# Patient Record
Sex: MALE | Race: WHITE | NOT HISPANIC OR LATINO | URBAN - METROPOLITAN AREA
[De-identification: names, ages, dates, MRNs, and addresses within clinical notes are randomized per-mention and may not be internally consistent; named-entity substitution may affect disease eponyms.]

---

## 2017-07-03 ENCOUNTER — HOSPITAL ENCOUNTER (OUTPATIENT)
Dept: OTHER | Facility: HOSPITAL | Age: 52
Discharge: HOME | End: 2017-07-03
Attending: FAMILY MEDICINE | Admitting: FAMILY MEDICINE

## 2018-05-04 ENCOUNTER — HOSPITAL ENCOUNTER (OUTPATIENT)
Dept: OTHER | Facility: HOSPITAL | Age: 53
Discharge: HOME | End: 2018-05-04
Attending: INTERNAL MEDICINE | Admitting: INTERNAL MEDICINE

## 2018-11-01 ENCOUNTER — HOSPITAL ENCOUNTER (OUTPATIENT)
Dept: LAB | Facility: HOSPITAL | Age: 53
Discharge: HOME | End: 2018-11-01
Attending: FAMILY MEDICINE | Admitting: FAMILY MEDICINE

## 2018-11-01 ENCOUNTER — CONVERSION ENCOUNTER (OUTPATIENT)
Dept: FAMILY MEDICINE CLINIC | Facility: CLINIC | Age: 53
End: 2018-11-01

## 2018-11-01 LAB
ALBUMIN SERPL-MCNC: 4 G/DL (ref 3.5–4.8)
ALBUMIN SERPL-MCNC: 4 G/DL (ref 3.5–4.8)
ALBUMIN/GLOB SERPL: 1.3 {RATIO} (ref 1–1.7)
ALBUMIN/GLOB SERPL: 1.3 {RATIO} (ref 1–1.7)
ALP SERPL-CCNC: 65 IU/L (ref 32–91)
ALP SERPL-CCNC: 65 IU/L (ref 32–91)
ALT SERPL-CCNC: 31 IU/L (ref 17–63)
ALT SERPL-CCNC: 31 IU/L (ref 17–63)
ANION GAP SERPL CALC-SCNC: 10.8 MMOL/L (ref 10–20)
ANION GAP SERPL CALC-SCNC: 10.8 MMOL/L (ref 10–20)
AST SERPL-CCNC: 23 IU/L (ref 15–41)
AST SERPL-CCNC: 23 IU/L (ref 15–41)
BASOPHILS # BLD AUTO: 0 10*3/UL (ref 0–0.2)
BASOPHILS # BLD AUTO: 0 10*3/UL (ref 0–0.2)
BASOPHILS NFR BLD AUTO: 1 % (ref 0–2)
BASOPHILS NFR BLD AUTO: 1 % (ref 0–2)
BILIRUB SERPL-MCNC: 1.2 MG/DL (ref 0.3–1.2)
BILIRUB SERPL-MCNC: 1.2 MG/DL (ref 0.3–1.2)
BUN SERPL-MCNC: 15 MG/DL (ref 8–20)
BUN SERPL-MCNC: 15 MG/DL (ref 8–20)
BUN/CREAT SERPL: 16.7 (ref 6.2–20.3)
BUN/CREAT SERPL: 16.7 (ref 6.2–20.3)
CALCIUM SERPL-MCNC: 8.8 MG/DL (ref 8.9–10.3)
CALCIUM SERPL-MCNC: 8.8 MG/DL (ref 8.9–10.3)
CHLORIDE SERPL-SCNC: 103 MMOL/L (ref 101–111)
CHLORIDE SERPL-SCNC: 103 MMOL/L (ref 101–111)
CHOLEST SERPL-MCNC: 173 MG/DL
CHOLEST SERPL-MCNC: 173 MG/DL
CHOLEST/HDLC SERPL: 5.7 {RATIO}
CHOLEST/HDLC SERPL: 5.7 {RATIO}
CONV CO2: 27 MMOL/L (ref 22–32)
CONV CO2: 27 MMOL/L (ref 22–32)
CONV LDL CHOLESTEROL DIRECT: 111 MG/DL (ref 0–100)
CONV LDL CHOLESTEROL DIRECT: 111 MG/DL (ref 0–100)
CONV TOTAL PROTEIN: 7.2 G/DL (ref 6.1–7.9)
CONV TOTAL PROTEIN: 7.2 G/DL (ref 6.1–7.9)
CREAT UR-MCNC: 0.9 MG/DL (ref 0.7–1.2)
CREAT UR-MCNC: 0.9 MG/DL (ref 0.7–1.2)
DIFFERENTIAL METHOD BLD: (no result)
EOSINOPHIL # BLD AUTO: 0.1 10*3/UL (ref 0–0.3)
EOSINOPHIL # BLD AUTO: 0.1 10*3/UL (ref 0–0.3)
EOSINOPHIL # BLD AUTO: 1 % (ref 0–3)
EOSINOPHIL # BLD AUTO: 1 % (ref 0–3)
ERYTHROCYTE [DISTWIDTH] IN BLOOD BY AUTOMATED COUNT: 12.9 % (ref 11.5–14.5)
ERYTHROCYTE [DISTWIDTH] IN BLOOD BY AUTOMATED COUNT: 12.9 % (ref 11.5–14.5)
GLOBULIN UR ELPH-MCNC: 3.2 G/DL (ref 2.5–3.8)
GLOBULIN UR ELPH-MCNC: 3.2 G/DL (ref 2.5–3.8)
GLUCOSE SERPL-MCNC: 87 MG/DL (ref 65–99)
GLUCOSE UR QL: 87 MG/DL (ref 65–99)
HCT VFR BLD AUTO: 45.1 % (ref 40–54)
HCT VFR BLD AUTO: 45.1 % (ref 40–54)
HDLC SERPL-MCNC: 30 MG/DL
HDLC SERPL-MCNC: 30 MG/DL
HGB BLD-MCNC: 15.7 G/DL (ref 14–18)
HGB BLD-MCNC: 15.7 G/DL (ref 14–18)
LDLC/HDLC SERPL: 3.6 {RATIO}
LDLC/HDLC SERPL: 3.6 {RATIO}
LIPID INTERPRETATION: ABNORMAL
LYMPHOCYTES # BLD AUTO: 3.2 10*3/UL (ref 0.8–4.8)
LYMPHOCYTES # BLD AUTO: 3.2 10*3/UL (ref 0.8–4.8)
LYMPHOCYTES NFR BLD AUTO: 39 % (ref 18–42)
LYMPHOCYTES NFR BLD AUTO: 39 % (ref 18–42)
MCH RBC QN AUTO: 30.4 PG (ref 26–32)
MCH RBC QN AUTO: 30.4 PG (ref 26–32)
MCHC RBC AUTO-ENTMCNC: 34.7 G/DL (ref 32–36)
MCHC RBC AUTO-ENTMCNC: 34.7 G/DL (ref 32–36)
MCV RBC AUTO: 87.6 FL (ref 80–94)
MCV RBC AUTO: 87.6 FL (ref 80–94)
MONOCYTES # BLD AUTO: 0.7 10*3/UL (ref 0.1–1.3)
MONOCYTES # BLD AUTO: 0.7 10*3/UL (ref 0.1–1.3)
MONOCYTES NFR BLD AUTO: 8 % (ref 2–11)
MONOCYTES NFR BLD AUTO: 8 % (ref 2–11)
NEUTROPHILS # BLD AUTO: 4.2 10*3/UL (ref 2.3–8.6)
NEUTROPHILS NFR BLD AUTO: 51 % (ref 50–75)
NEUTS SEG # BLD MANUAL: 4.2 10*3/UL (ref 2.3–8.6)
NEUTS SEG NFR BLD: 51 % (ref 50–75)
NRBC BLD AUTO-RTO: 0 /100{WBCS}
NRBC BLD AUTO-RTO: 0 /100{WBCS}
NRBC BLD AUTO-RTO: 0 10*3/UL
NRBC/RBC NFR BLD MANUAL: 0 10*3/UL
PLATELET # BLD AUTO: 271 10*3/UL (ref 150–450)
PLATELET # BLD AUTO: 271 10*3/UL (ref 150–450)
PMV BLD AUTO: 7.6 FL (ref 7.4–10.4)
PMV BLD AUTO: 7.6 FL (ref 7.4–10.4)
POTASSIUM SERPL-SCNC: 3.8 MMOL/L (ref 3.6–5.1)
POTASSIUM SERPL-SCNC: 3.8 MMOL/L (ref 3.6–5.1)
PSA SERPL-MCNC: 0.61 NG/ML (ref 0–4)
RBC # BLD AUTO: 5.14 10*6/UL (ref 4.6–6)
RBC #/AREA URNS HPF: 5.14 10*6/UL (ref 4.6–6)
REF LAB TEST METHOD: (no result)
SODIUM SERPL-SCNC: 137 MMOL/L (ref 136–144)
SODIUM SERPL-SCNC: 137 MMOL/L (ref 136–144)
TRIGL SERPL-MCNC: 187 MG/DL
TRIGL SERPL-MCNC: 187 MG/DL
TSH SERPL-ACNC: 1.69 UIU/ML (ref 0.34–5.6)
VLDLC SERPL CALC-MCNC: 31.9 MG/DL
VLDLC SERPL-MCNC: 31.9 MG/DL
WBC # BLD AUTO: 8.2 10*3/UL (ref 4.5–11.5)
WBC # BLD AUTO: 8.2 10*3/UL (ref 4.5–11.5)

## 2018-12-14 ENCOUNTER — ON CAMPUS - OUTPATIENT (AMBULATORY)
Dept: URBAN - METROPOLITAN AREA HOSPITAL 2 | Facility: HOSPITAL | Age: 53
End: 2018-12-14
Payer: COMMERCIAL

## 2018-12-14 VITALS
DIASTOLIC BLOOD PRESSURE: 54 MMHG | TEMPERATURE: 97.5 F | DIASTOLIC BLOOD PRESSURE: 67 MMHG | SYSTOLIC BLOOD PRESSURE: 92 MMHG | DIASTOLIC BLOOD PRESSURE: 56 MMHG | DIASTOLIC BLOOD PRESSURE: 62 MMHG | HEART RATE: 64 BPM | SYSTOLIC BLOOD PRESSURE: 91 MMHG | HEART RATE: 69 BPM | SYSTOLIC BLOOD PRESSURE: 86 MMHG | HEART RATE: 73 BPM | HEART RATE: 65 BPM | DIASTOLIC BLOOD PRESSURE: 61 MMHG | WEIGHT: 200 LBS | SYSTOLIC BLOOD PRESSURE: 90 MMHG | OXYGEN SATURATION: 100 % | DIASTOLIC BLOOD PRESSURE: 59 MMHG | OXYGEN SATURATION: 95 % | DIASTOLIC BLOOD PRESSURE: 57 MMHG | OXYGEN SATURATION: 99 % | OXYGEN SATURATION: 98 % | SYSTOLIC BLOOD PRESSURE: 121 MMHG | HEIGHT: 68 IN | RESPIRATION RATE: 18 BRPM | SYSTOLIC BLOOD PRESSURE: 101 MMHG | DIASTOLIC BLOOD PRESSURE: 77 MMHG | HEART RATE: 74 BPM | RESPIRATION RATE: 16 BRPM

## 2018-12-14 DIAGNOSIS — K57.30 DIVERTICULOSIS OF LARGE INTESTINE WITHOUT PERFORATION OR ABS: ICD-10-CM

## 2018-12-14 DIAGNOSIS — K64.0 FIRST DEGREE HEMORRHOIDS: ICD-10-CM

## 2018-12-14 DIAGNOSIS — Z12.11 ENCOUNTER FOR SCREENING FOR MALIGNANT NEOPLASM OF COLON: ICD-10-CM

## 2018-12-14 PROCEDURE — 45378 DIAGNOSTIC COLONOSCOPY: CPT | Mod: 33 | Performed by: INTERNAL MEDICINE

## 2018-12-18 PROBLEM — K64.0 FIRST DEGREE HEMORRHOIDS: Status: ACTIVE | Noted: 2018-12-14

## 2019-01-14 ENCOUNTER — OFFICE (AMBULATORY)
Dept: URBAN - METROPOLITAN AREA CLINIC 64 | Facility: CLINIC | Age: 54
End: 2019-01-14

## 2019-01-14 VITALS — HEIGHT: 68 IN

## 2019-01-14 DIAGNOSIS — K64.0 FIRST DEGREE HEMORRHOIDS: ICD-10-CM

## 2019-01-14 PROBLEM — K64.8 HEMORRHOIDS, INTERNAL: Status: ACTIVE | Noted: 2019-01-14

## 2019-01-14 PROCEDURE — 46221 LIGATION OF HEMORRHOID(S): CPT | Performed by: INTERNAL MEDICINE

## 2019-01-31 ENCOUNTER — OFFICE (AMBULATORY)
Dept: URBAN - METROPOLITAN AREA CLINIC 64 | Facility: CLINIC | Age: 54
End: 2019-01-31

## 2019-01-31 VITALS — HEIGHT: 68 IN

## 2019-01-31 DIAGNOSIS — K64.0 FIRST DEGREE HEMORRHOIDS: ICD-10-CM

## 2019-01-31 PROCEDURE — 46221 LIGATION OF HEMORRHOID(S): CPT | Performed by: INTERNAL MEDICINE

## 2019-03-11 ENCOUNTER — OFFICE (AMBULATORY)
Dept: URBAN - METROPOLITAN AREA CLINIC 64 | Facility: CLINIC | Age: 54
End: 2019-03-11

## 2019-03-11 VITALS — HEIGHT: 68 IN

## 2019-03-11 DIAGNOSIS — K64.0 FIRST DEGREE HEMORRHOIDS: ICD-10-CM

## 2019-03-11 PROCEDURE — 46221 LIGATION OF HEMORRHOID(S): CPT | Performed by: INTERNAL MEDICINE

## 2019-06-19 ENCOUNTER — TELEPHONE (OUTPATIENT)
Dept: CARDIOLOGY | Facility: CLINIC | Age: 54
End: 2019-06-19

## 2019-06-19 RX ORDER — BENAZEPRIL HYDROCHLORIDE 40 MG/1
40 TABLET, FILM COATED ORAL DAILY
Qty: 30 TABLET | Refills: 3 | Status: SHIPPED | OUTPATIENT
Start: 2019-06-19 | End: 2019-12-30

## 2019-06-19 NOTE — TELEPHONE ENCOUNTER
Patient stated Benazapril was called into the pharmacy from Dr. Reaves office and they only called in the 20mg not the 40mg like Marcos had ordered last visit. Can Marcos orders the 40mg?

## 2019-06-20 RX ORDER — BACLOFEN 10 MG/1
TABLET ORAL
Qty: 30 TABLET | Refills: 0 | Status: SHIPPED | OUTPATIENT
Start: 2019-06-20 | End: 2020-02-25

## 2019-12-30 RX ORDER — BENAZEPRIL HYDROCHLORIDE 40 MG/1
TABLET, FILM COATED ORAL
Qty: 30 TABLET | Refills: 0 | Status: SHIPPED | OUTPATIENT
Start: 2019-12-30 | End: 2020-02-03

## 2020-02-03 RX ORDER — BENAZEPRIL HYDROCHLORIDE 40 MG/1
TABLET, FILM COATED ORAL
Qty: 30 TABLET | Refills: 0 | Status: SHIPPED | OUTPATIENT
Start: 2020-02-03 | End: 2020-02-25 | Stop reason: SDUPTHER

## 2020-02-25 ENCOUNTER — OFFICE VISIT (OUTPATIENT)
Dept: CARDIOLOGY | Facility: CLINIC | Age: 55
End: 2020-02-25

## 2020-02-25 VITALS
HEART RATE: 69 BPM | WEIGHT: 222 LBS | SYSTOLIC BLOOD PRESSURE: 132 MMHG | HEIGHT: 68 IN | BODY MASS INDEX: 33.65 KG/M2 | DIASTOLIC BLOOD PRESSURE: 76 MMHG

## 2020-02-25 DIAGNOSIS — I10 HYPERTENSION, BENIGN: Primary | ICD-10-CM

## 2020-02-25 PROBLEM — E78.5 DYSLIPIDEMIA: Status: ACTIVE | Noted: 2020-02-25

## 2020-02-25 PROBLEM — M25.50 ARTHRALGIA: Status: ACTIVE | Noted: 2020-02-25

## 2020-02-25 PROBLEM — E66.9 OBESITY: Status: ACTIVE | Noted: 2020-02-25

## 2020-02-25 PROBLEM — R07.9 CHEST PAIN: Status: ACTIVE | Noted: 2018-04-20

## 2020-02-25 PROCEDURE — 99213 OFFICE O/P EST LOW 20 MIN: CPT | Performed by: NURSE PRACTITIONER

## 2020-02-25 PROCEDURE — 93000 ELECTROCARDIOGRAM COMPLETE: CPT | Performed by: NURSE PRACTITIONER

## 2020-02-25 RX ORDER — ELECTROLYTES/DEXTROSE
1 SOLUTION, ORAL ORAL DAILY
COMMUNITY

## 2020-02-25 RX ORDER — AMLODIPINE BESYLATE 10 MG/1
10 TABLET ORAL DAILY
Qty: 30 TABLET | Refills: 11 | Status: SHIPPED | OUTPATIENT
Start: 2020-02-25 | End: 2021-04-15

## 2020-02-25 RX ORDER — BENAZEPRIL HYDROCHLORIDE 40 MG/1
40 TABLET, FILM COATED ORAL DAILY
Qty: 30 TABLET | Refills: 11 | Status: SHIPPED | OUTPATIENT
Start: 2020-02-25 | End: 2021-02-22 | Stop reason: SDUPTHER

## 2020-02-25 RX ORDER — AMLODIPINE BESYLATE 10 MG/1
10 TABLET ORAL DAILY
COMMUNITY
Start: 2019-12-20 | End: 2020-02-25 | Stop reason: SDUPTHER

## 2020-02-26 NOTE — PROGRESS NOTES
Cardiology Office Follow Up Visit      Primary Care Provider:  Brian Jones MD    Reason for f/u:     History of hypertension      Subjective     CC:    The patient denies chest pain or dyspnea currently    History of Present Illness       Damien Avalos is a 55 y.o. male.  He has a past medical history of hypertension.  He has a family history of premature coronary artery disease.    He was previously seen by Dr. Lam with complaints of chest pain and shortness of breath.  He underwent a stress Myoview and an echocardiogram in May 2018 that showed no reversible ischemia and LV function was preserved with no significant valvular flow abnormalities.    Currently he denies any current or recent chest pain, dyspnea, PND, orthopnea, palpitations, near syncope, lower extremity edema or feelings of his heart racing.  He reports compliance with medical therapy.    Previous records including stress Myoview and echocardiogram have been reviewed        Past Medical History:   Diagnosis Date   • Hypertension        Past Surgical History:   Procedure Laterality Date   • SHOULDER SURGERY      right shoulder-slap tear         Current Outpatient Medications:   •  amLODIPine (NORVASC) 10 MG tablet, Take 1 tablet by mouth Daily., Disp: 30 tablet, Rfl: 11  •  benazepril (LOTENSIN) 40 MG tablet, Take 1 tablet by mouth Daily., Disp: 30 tablet, Rfl: 11  •  Multiple Vitamins-Minerals (MULTIVITAMIN ADULT) tablet, Take 1 each by mouth Daily., Disp: , Rfl:     Social History     Socioeconomic History   • Marital status:      Spouse name: Not on file   • Number of children: Not on file   • Years of education: Not on file   • Highest education level: Not on file   Tobacco Use   • Smoking status: Never Smoker   • Smokeless tobacco: Never Used   Substance and Sexual Activity   • Alcohol use: Never     Frequency: Never   • Drug use: Never   • Sexual activity: Defer       Family History   Problem Relation Age of Onset   •  "Heart disease Mother        The following portions of the patient's history were reviewed and updated as appropriate: allergies, current medications, past family history, past medical history, past social history, past surgical history and problem list.    Review of Systems   Constitution: Negative for decreased appetite and diaphoresis.   HENT: Negative for congestion, hearing loss and nosebleeds.    Cardiovascular: Negative for chest pain, claudication, dyspnea on exertion, irregular heartbeat, leg swelling, near-syncope, orthopnea, palpitations, paroxysmal nocturnal dyspnea and syncope.   Respiratory: Negative for cough, shortness of breath and sleep disturbances due to breathing.    Endocrine: Negative for polyuria.   Hematologic/Lymphatic: Does not bruise/bleed easily.   Skin: Negative for itching and rash.   Musculoskeletal: Negative for back pain, muscle weakness and myalgias.   Gastrointestinal: Negative for abdominal pain, change in bowel habit and nausea.   Genitourinary: Negative for dysuria, flank pain, frequency and hesitancy.   Neurological: Negative for dizziness, tremors and weakness.   Psychiatric/Behavioral: Negative for altered mental status. The patient does not have insomnia.      /76   Pulse 69   Ht 172.7 cm (67.99\")   Wt 101 kg (222 lb)   BMI 33.76 kg/m² .  Objective     Physical Exam   Constitutional: He is oriented to person, place, and time. He appears well-developed and well-nourished. No distress.   HENT:   Head: Normocephalic and atraumatic.   Eyes: Pupils are equal, round, and reactive to light.   Neck: Normal range of motion. Neck supple. No JVD present.   Cardiovascular: Normal rate, regular rhythm, S1 normal, S2 normal, normal heart sounds and intact distal pulses.   No murmur heard.  Pulmonary/Chest: Effort normal and breath sounds normal.   Abdominal: Soft. Normal appearance. He exhibits no distension. There is no tenderness.   Musculoskeletal: Normal range of motion. He " exhibits no edema.   Neurological: He is alert and oriented to person, place, and time.   Skin: Skin is warm and dry.   Psychiatric: He has a normal mood and affect.           ECG 12 Lead  Date/Time: 2/25/2020 12:41 PM  Performed by: Racquel Crenshaw APRN  Authorized by: Racquel Crenshaw APRN   Comparison: not compared with previous ECG   Previous ECG: no previous ECG available  Rhythm: sinus rhythm  Rate: normal  BPM: 69  Conduction: conduction normal  ST Segments: ST segments normal  T Waves: T waves normal  QRS axis: normal  Other: no other findings    Clinical impression: normal ECG                     Diagnoses and all orders for this visit:    1. Hypertension, benign (Primary)  Comments:  Blood pressure stable on current medical therapy.  Orders:  -     ECG 12 Lead  -     benazepril (LOTENSIN) 40 MG tablet; Take 1 tablet by mouth Daily.  Dispense: 30 tablet; Refill: 11  -     amLODIPine (NORVASC) 10 MG tablet; Take 1 tablet by mouth Daily.  Dispense: 30 tablet; Refill: 11                Plan:  Continue current medications as prescribed.    Refills have been sent.  Previous records reviewed.  He is currently without any signs or symptoms of chest pain or dyspnea.  Would recommend continuing aggressive risk factor modification including control of blood pressure, regular progressive exercise periodic lipid assessment which she says is been done by his PCP.    The patient will be asked to return here in 1 year for follow-up or sooner if he should develop any cardiac problems.

## 2020-06-16 ENCOUNTER — CLINICAL SUPPORT (OUTPATIENT)
Dept: FAMILY MEDICINE CLINIC | Facility: CLINIC | Age: 55
End: 2020-06-16

## 2020-06-16 VITALS
BODY MASS INDEX: 33.49 KG/M2 | HEIGHT: 68 IN | WEIGHT: 221 LBS | SYSTOLIC BLOOD PRESSURE: 128 MMHG | RESPIRATION RATE: 18 BRPM | DIASTOLIC BLOOD PRESSURE: 76 MMHG | HEART RATE: 76 BPM | OXYGEN SATURATION: 96 % | TEMPERATURE: 98.3 F

## 2020-06-16 DIAGNOSIS — Z02.4 ENCOUNTER FOR CDL (COMMERCIAL DRIVING LICENSE) EXAM: Primary | ICD-10-CM

## 2020-06-16 LAB
BILIRUB BLD-MCNC: NEGATIVE MG/DL
CLARITY, POC: CLEAR
COLOR UR: YELLOW
GLUCOSE UR STRIP-MCNC: NEGATIVE MG/DL
KETONES UR QL: NEGATIVE
LEUKOCYTE EST, POC: NEGATIVE
NITRITE UR-MCNC: NEGATIVE MG/ML
PH UR: 5 [PH] (ref 5–8)
PROT UR STRIP-MCNC: NEGATIVE MG/DL
RBC # UR STRIP: ABNORMAL /UL
SP GR UR: 1.01 (ref 1–1.03)
UROBILINOGEN UR QL: NORMAL

## 2020-06-16 PROCEDURE — 81003 URINALYSIS AUTO W/O SCOPE: CPT | Performed by: FAMILY MEDICINE

## 2020-06-16 PROCEDURE — DOTPHY: Performed by: FAMILY MEDICINE

## 2020-06-16 NOTE — PROGRESS NOTES
" Medical Examination    Subjective   Damien Avalos is a 55 y.o. male who presents today for a  fitness determination physical exam. The patient reports no problems.  The following portions of the patient's history were reviewed and updated as appropriate: allergies, current medications, past family history, past medical history, past social history, past surgical history and problem list.  Review of Systems  A comprehensive review of systems was negative.    Objective    Vision:   Visual Acuity Screening    Right eye Left eye Both eyes   Without correction: 20/25 20/25 20/25   With correction:          Applicant can recognize and distinguish among traffic control signals and devices showing standard red, green, and ana colors.  Applicant has peripheral vision to 90 degrees in each eye.         Monocular Vision?: No      Hearing:  Applicant can distinguish forced whisper at a distance of 5 feet with both ears.         /76   Pulse 76   Temp 98.3 °F (36.8 °C)   Resp 18   Ht 172.7 cm (68\")   Wt 100 kg (221 lb)   SpO2 96%   BMI 33.60 kg/m²     General Appearance:    Alert, cooperative, no distress, appears stated age   Head:    Normocephalic, without obvious abnormality, atraumatic   Eyes:    PERRL, conjunctiva/corneas clear, EOM's intact, fundi     benign, both eyes        Ears:    Normal TM's and external ear canals, both ears   Nose:   Nares normal, septum midline, mucosa normal, no drainage    or sinus tenderness   Throat:   Lips, mucosa, and tongue normal; teeth and gums normal   Neck:   Supple, symmetrical, trachea midline, no adenopathy;        thyroid:  No enlargement/tenderness/nodules; no carotid    bruit or JVD   Back:     Symmetric, no curvature, ROM normal, no CVA tenderness   Lungs:     Clear to auscultation bilaterally, respirations unlabored   Chest wall:    No tenderness or deformity   Heart:    Regular rate and rhythm, S1 and S2 normal, no murmur, rub   " or gallop   Abdomen:     Soft, non-tender, bowel sounds active all four quadrants,     no masses, no organomegaly   Genitalia:    Normal male without lesion, discharge or tenderness   Rectal:    Normal tone, normal prostate, no masses or tenderness;    guaiac negative stool   Extremities:   Extremities normal, atraumatic, no cyanosis or edema   Pulses:   2+ and symmetric all extremities   Skin:   Skin color, texture, turgor normal, no rashes or lesions   Lymph nodes:   Cervical, supraclavicular, and axillary nodes normal   Neurologic:   CNII-XII intact. Normal strength, sensation and reflexes       throughout       Labs:  Lab Results   Component Value Date    BILIRUBINUR Negative 10/25/2018    GLUCOSEU 87 11/01/2018       Assessment/Plan   Healthy male exam.   Meets standards in 49 .41;  qualifies for 2 year certificate.     Medical examiners certificate completed and printed.  Return as needed.

## 2021-01-14 DIAGNOSIS — I10 HYPERTENSION, BENIGN: ICD-10-CM

## 2021-01-14 RX ORDER — BENAZEPRIL HYDROCHLORIDE 40 MG/1
TABLET, FILM COATED ORAL
Qty: 90 TABLET | Refills: 3 | OUTPATIENT
Start: 2021-01-14

## 2021-02-22 DIAGNOSIS — I10 HYPERTENSION, BENIGN: ICD-10-CM

## 2021-02-22 RX ORDER — BENAZEPRIL HYDROCHLORIDE 40 MG/1
40 TABLET, FILM COATED ORAL DAILY
Qty: 30 TABLET | Refills: 1 | Status: SHIPPED | OUTPATIENT
Start: 2021-02-22 | End: 2021-06-03

## 2021-04-01 ENCOUNTER — OFFICE VISIT (OUTPATIENT)
Dept: CARDIOLOGY | Facility: CLINIC | Age: 56
End: 2021-04-01

## 2021-04-01 VITALS
HEIGHT: 68 IN | DIASTOLIC BLOOD PRESSURE: 84 MMHG | SYSTOLIC BLOOD PRESSURE: 128 MMHG | BODY MASS INDEX: 33.65 KG/M2 | WEIGHT: 222 LBS | HEART RATE: 71 BPM

## 2021-04-01 DIAGNOSIS — I10 ESSENTIAL HYPERTENSION: ICD-10-CM

## 2021-04-01 DIAGNOSIS — E78.5 DYSLIPIDEMIA: ICD-10-CM

## 2021-04-01 DIAGNOSIS — R07.9 CHEST PAIN, UNSPECIFIED TYPE: Primary | ICD-10-CM

## 2021-04-01 PROCEDURE — 93000 ELECTROCARDIOGRAM COMPLETE: CPT | Performed by: INTERNAL MEDICINE

## 2021-04-01 PROCEDURE — 99212 OFFICE O/P EST SF 10 MIN: CPT | Performed by: INTERNAL MEDICINE

## 2021-04-15 DIAGNOSIS — I10 HYPERTENSION, BENIGN: ICD-10-CM

## 2021-04-15 RX ORDER — AMLODIPINE BESYLATE 10 MG/1
TABLET ORAL
Qty: 90 TABLET | Refills: 3 | Status: SHIPPED | OUTPATIENT
Start: 2021-04-15 | End: 2022-06-23

## 2021-06-03 DIAGNOSIS — I10 HYPERTENSION, BENIGN: ICD-10-CM

## 2021-06-03 RX ORDER — BENAZEPRIL HYDROCHLORIDE 40 MG/1
TABLET, FILM COATED ORAL
Qty: 90 TABLET | Refills: 3 | Status: SHIPPED | OUTPATIENT
Start: 2021-06-03 | End: 2022-05-16

## 2021-11-01 ENCOUNTER — OFFICE VISIT (OUTPATIENT)
Dept: FAMILY MEDICINE CLINIC | Facility: CLINIC | Age: 56
End: 2021-11-01

## 2021-11-01 ENCOUNTER — HOSPITAL ENCOUNTER (OUTPATIENT)
Dept: GENERAL RADIOLOGY | Facility: HOSPITAL | Age: 56
Discharge: HOME | End: 2021-11-01
Admitting: FAMILY MEDICINE

## 2021-11-01 ENCOUNTER — TELEPHONE (OUTPATIENT)
Dept: FAMILY MEDICINE CLINIC | Facility: CLINIC | Age: 56
End: 2021-11-01

## 2021-11-01 VITALS
RESPIRATION RATE: 18 BRPM | WEIGHT: 215.6 LBS | SYSTOLIC BLOOD PRESSURE: 126 MMHG | HEART RATE: 79 BPM | TEMPERATURE: 97.3 F | DIASTOLIC BLOOD PRESSURE: 84 MMHG | OXYGEN SATURATION: 98 % | HEIGHT: 68 IN | BODY MASS INDEX: 32.67 KG/M2

## 2021-11-01 DIAGNOSIS — N45.1 RIGHT EPIDIDYMITIS: Primary | ICD-10-CM

## 2021-11-01 DIAGNOSIS — M54.42 ACUTE LEFT-SIDED LOW BACK PAIN WITH LEFT-SIDED SCIATICA: ICD-10-CM

## 2021-11-01 PROCEDURE — 99214 OFFICE O/P EST MOD 30 MIN: CPT | Performed by: FAMILY MEDICINE

## 2021-11-01 PROCEDURE — 96372 THER/PROPH/DIAG INJ SC/IM: CPT | Performed by: FAMILY MEDICINE

## 2021-11-01 PROCEDURE — 72110 X-RAY EXAM L-2 SPINE 4/>VWS: CPT

## 2021-11-01 RX ORDER — KETOROLAC TROMETHAMINE 30 MG/ML
30 INJECTION, SOLUTION INTRAMUSCULAR; INTRAVENOUS EVERY 6 HOURS PRN
Status: DISCONTINUED | OUTPATIENT
Start: 2021-11-01 | End: 2021-11-01

## 2021-11-01 RX ORDER — BACLOFEN 10 MG/1
10 TABLET ORAL NIGHTLY PRN
Qty: 30 TABLET | Refills: 0 | Status: SHIPPED | OUTPATIENT
Start: 2021-11-01 | End: 2021-12-01

## 2021-11-01 RX ORDER — KETOROLAC TROMETHAMINE 30 MG/ML
30 INJECTION, SOLUTION INTRAMUSCULAR; INTRAVENOUS ONCE
Status: COMPLETED | OUTPATIENT
Start: 2021-11-01 | End: 2021-11-01

## 2021-11-01 RX ORDER — SULFAMETHOXAZOLE AND TRIMETHOPRIM 800; 160 MG/1; MG/1
1 TABLET ORAL 2 TIMES DAILY
Qty: 20 TABLET | Refills: 0 | Status: SHIPPED | OUTPATIENT
Start: 2021-11-01 | End: 2021-11-11

## 2021-11-01 RX ORDER — NAPROXEN 500 MG/1
500 TABLET ORAL 2 TIMES DAILY WITH MEALS
Qty: 60 TABLET | Refills: 2 | Status: SHIPPED | OUTPATIENT
Start: 2021-11-01 | End: 2021-12-01

## 2021-11-01 RX ADMIN — KETOROLAC TROMETHAMINE 30 MG: 30 INJECTION, SOLUTION INTRAMUSCULAR; INTRAVENOUS at 15:58

## 2021-11-01 RX ADMIN — KETOROLAC TROMETHAMINE 30 MG: 30 INJECTION, SOLUTION INTRAMUSCULAR; INTRAVENOUS at 15:57

## 2021-11-01 NOTE — PROGRESS NOTES
Subjective   Damien Avalos is a 56 y.o. male.   Chief Complaint   Patient presents with   • Leg Pain       Right inguinal pain radiating to right testicle. Drives a truck at work. No dysuria. No right hip pain, no trauma    3 week history of left leg pain to foot, positional, worse with lying down. No trauma. No swelling, no SOA or CP     Leg Pain   The incident occurred more than 1 week ago (3 weeks). The incident occurred at home. There was no injury mechanism. The pain is present in the left leg. The quality of the pain is described as aching. The pain has been constant since onset. Pertinent negatives include no numbness. He reports no foreign bodies present. The symptoms are aggravated by weight bearing and movement. Treatments tried: tylenol. The treatment provided no relief.        The following portions of the patient's history were reviewed and updated as appropriate: allergies, current medications, past family history, past medical history, past social history, past surgical history and problem list.    Patient Active Problem List   Diagnosis   • Arthralgia   • Chest pain   • Dyslipidemia   • Essential hypertension   • Obesity       Current Outpatient Medications on File Prior to Visit   Medication Sig Dispense Refill   • amLODIPine (NORVASC) 10 MG tablet TAKE 1 TABLET BY MOUTH EVERY DAY 90 tablet 3   • benazepril (LOTENSIN) 40 MG tablet TAKE 1 TABLET BY MOUTH EVERY DAY 90 tablet 3   • Multiple Vitamins-Minerals (MULTIVITAMIN ADULT) tablet Take 1 each by mouth Daily.       No current facility-administered medications on file prior to visit.     Current outpatient and discharge medications have been reconciled for the patient.  Reviewed by: Brian Jones MD      No Known Allergies    Review of Systems   Constitutional: Negative for activity change, appetite change, fatigue and fever.   HENT: Negative for ear pain, swollen glands and voice change.    Eyes: Negative for visual disturbance.  "  Respiratory: Negative for shortness of breath and wheezing.    Cardiovascular: Negative for chest pain and leg swelling.   Gastrointestinal: Negative for abdominal pain, blood in stool, constipation, diarrhea, nausea and vomiting.   Endocrine: Negative for polydipsia and polyuria.   Genitourinary: Positive for testicular pain. Negative for dysuria, flank pain, frequency, hematuria and scrotal swelling.   Musculoskeletal: Positive for arthralgias and back pain. Negative for joint swelling, neck pain and neck stiffness.   Skin: Negative for rash and bruise.   Neurological: Negative for weakness, numbness and headache.   Psychiatric/Behavioral: Negative for suicidal ideas and depressed mood.     I have reviewed and confirmed the accuracy of the ROS as documented by the MA/LPN/RN Brian Jones MD    Objective   Visit Vitals  /84 (BP Location: Right arm, Patient Position: Sitting, Cuff Size: Adult)   Pulse 79   Temp 97.3 °F (36.3 °C)   Resp 18   Ht 172.7 cm (68\")   Wt 97.8 kg (215 lb 9.6 oz)   SpO2 98%   BMI 32.78 kg/m²       Physical Exam  Constitutional:       Appearance: He is well-developed.   HENT:      Head: Normocephalic and atraumatic.      Right Ear: External ear normal.      Left Ear: External ear normal.      Nose: Nose normal.   Eyes:      Pupils: Pupils are equal, round, and reactive to light.   Cardiovascular:      Rate and Rhythm: Normal rate and regular rhythm.      Heart sounds: Normal heart sounds.   Pulmonary:      Effort: Pulmonary effort is normal.      Breath sounds: Normal breath sounds.   Abdominal:      General: Bowel sounds are normal.      Palpations: Abdomen is soft.   Genitourinary:     Comments: Epididymal tenderness right without mass  Musculoskeletal:         General: Normal range of motion.      Cervical back: Normal range of motion and neck supple.        Back:       Comments: Left leg pain with sitting, movement   Dorsal pedal pulses 2+ bilat. Neg jenifer/cord. No leg " swelling    Skin:     General: Skin is warm and dry.   Neurological:      Mental Status: He is alert and oriented to person, place, and time.   Psychiatric:         Behavior: Behavior normal.         Thought Content: Thought content normal.         Judgment: Judgment normal.         Assessment/Plan .    Diagnoses and all orders for this visit:    1. Right epididymitis (Primary)  -     sulfamethoxazole-trimethoprim (Bactrim DS) 800-160 MG per tablet; Take 1 tablet by mouth 2 (Two) Times a Day for 10 days.  Dispense: 20 tablet; Refill: 0    2. Acute left-sided low back pain with left-sided sciatica  -     naproxen (Naprosyn) 500 MG tablet; Take 1 tablet by mouth 2 (Two) Times a Day With Meals for 30 days.  Dispense: 60 tablet; Refill: 2  -     Discontinue: ketorolac (TORADOL) injection 30 mg  -     baclofen (LIORESAL) 10 MG tablet; Take 1 tablet by mouth At Night As Needed for Muscle Spasms for up to 30 days.  Dispense: 30 tablet; Refill: 0  -     XR Spine Lumbar Complete 4+VW  -     ketorolac (TORADOL) injection 30 mg  -     ketorolac (TORADOL) injection 30 mg     Findings discussed. All questions answered.  Differential diagnosis discussed.   Medication and medication adverse effects discussed.  Drug education given and explained to patient. Patient verbalized understanding.  Follow-up in 2 weeks if not better.  Follow-up sooner for worsening symptoms or for any concerns.         I wore protective equipment throughout this patient encounter to include mask and gloves. Hand hygiene was performed before donning protective equipment and after removal when leaving the room

## 2021-11-01 NOTE — TELEPHONE ENCOUNTER
----- Message from Brian Jones MD sent at 11/1/2021  5:10 PM EDT -----  Please notify patient that:   Xray showed moderate arthritis

## 2022-01-05 ENCOUNTER — OFFICE VISIT (OUTPATIENT)
Dept: FAMILY MEDICINE CLINIC | Facility: CLINIC | Age: 57
End: 2022-01-05

## 2022-01-05 VITALS
OXYGEN SATURATION: 99 % | SYSTOLIC BLOOD PRESSURE: 144 MMHG | BODY MASS INDEX: 33.62 KG/M2 | HEIGHT: 68 IN | TEMPERATURE: 98.7 F | DIASTOLIC BLOOD PRESSURE: 80 MMHG | WEIGHT: 221.8 LBS | HEART RATE: 77 BPM | RESPIRATION RATE: 18 BRPM

## 2022-01-05 DIAGNOSIS — M54.41 ACUTE RIGHT-SIDED LOW BACK PAIN WITH RIGHT-SIDED SCIATICA: Primary | ICD-10-CM

## 2022-01-05 PROCEDURE — 99214 OFFICE O/P EST MOD 30 MIN: CPT | Performed by: FAMILY MEDICINE

## 2022-01-05 PROCEDURE — 96372 THER/PROPH/DIAG INJ SC/IM: CPT | Performed by: FAMILY MEDICINE

## 2022-01-05 RX ORDER — GABAPENTIN 300 MG/1
CAPSULE ORAL
Qty: 60 CAPSULE | Refills: 0 | Status: SHIPPED | OUTPATIENT
Start: 2022-01-05 | End: 2022-06-23

## 2022-01-05 RX ORDER — KETOROLAC TROMETHAMINE 30 MG/ML
30 INJECTION, SOLUTION INTRAMUSCULAR; INTRAVENOUS ONCE
Status: COMPLETED | OUTPATIENT
Start: 2022-01-05 | End: 2022-01-05

## 2022-01-05 RX ADMIN — KETOROLAC TROMETHAMINE 30 MG: 30 INJECTION, SOLUTION INTRAMUSCULAR; INTRAVENOUS at 16:56

## 2022-01-05 NOTE — PATIENT INSTRUCTIONS
Sciatica    Sciatica is pain, numbness, weakness, or tingling along the path of the sciatic nerve. The sciatic nerve starts in the lower back and runs down the back of each leg. The nerve controls the muscles in the lower leg and in the back of the knee. It also provides feeling (sensation) to the back of the thigh, the lower leg, and the sole of the foot. Sciatica is a symptom of another medical condition that pinches or puts pressure on the sciatic nerve.  Sciatica most often only affects one side of the body. Sciatica usually goes away on its own or with treatment. In some cases, sciatica may come back (recur).  What are the causes?  This condition is caused by pressure on the sciatic nerve or pinching of the nerve. This may be the result of:  · A disk in between the bones of the spine bulging out too far (herniated disk).  · Age-related changes in the spinal disks.  · A pain disorder that affects a muscle in the buttock.  · Extra bone growth near the sciatic nerve.  · A break (fracture) of the pelvis.  · Pregnancy.  · Tumor. This is rare.  What increases the risk?  The following factors may make you more likely to develop this condition:  · Playing sports that place pressure or stress on the spine.  · Having poor strength and flexibility.  · A history of back injury or surgery.  · Sitting for long periods of time.  · Doing activities that involve repetitive bending or lifting.  · Obesity.  What are the signs or symptoms?  Symptoms can vary from mild to very severe, and they may include:  · Any of these problems in the lower back, leg, hip, or buttock:  ? Mild tingling, numbness, or dull aches.  ? Burning sensations.  ? Sharp pains.  · Numbness in the back of the calf or the sole of the foot.  · Leg weakness.  · Severe back pain that makes movement difficult.  Symptoms may get worse when you cough, sneeze, or laugh, or when you sit or stand for long periods of time.  How is this diagnosed?  This condition may be  diagnosed based on:  · Your symptoms and medical history.  · A physical exam.  · Blood tests.  · Imaging tests, such as:  ? X-rays.  ? MRI.  ? CT scan.  How is this treated?  In many cases, this condition improves on its own without treatment. However, treatment may include:  · Reducing or modifying physical activity.  · Exercising and stretching.  · Icing and applying heat to the affected area.  · Medicines that help to:  ? Relieve pain and swelling.  ? Relax your muscles.  · Injections of medicines that help to relieve pain, irritation, and inflammation around the sciatic nerve (steroids).  · Surgery.  Follow these instructions at home:  Medicines  · Take over-the-counter and prescription medicines only as told by your health care provider.  · Ask your health care provider if the medicine prescribed to you:  ? Requires you to avoid driving or using heavy machinery.  ? Can cause constipation. You may need to take these actions to prevent or treat constipation:  § Drink enough fluid to keep your urine pale yellow.  § Take over-the-counter or prescription medicines.  § Eat foods that are high in fiber, such as beans, whole grains, and fresh fruits and vegetables.  § Limit foods that are high in fat and processed sugars, such as fried or sweet foods.  Managing pain         · If directed, put ice on the affected area.  ? Put ice in a plastic bag.  ? Place a towel between your skin and the bag.  ? Leave the ice on for 20 minutes, 2-3 times a day.  · If directed, apply heat to the affected area. Use the heat source that your health care provider recommends, such as a moist heat pack or a heating pad.  ? Place a towel between your skin and the heat source.  ? Leave the heat on for 20-30 minutes.  ? Remove the heat if your skin turns bright red. This is especially important if you are unable to feel pain, heat, or cold. You may have a greater risk of getting burned.  Activity    · Return to your normal activities as told  by your health care provider. Ask your health care provider what activities are safe for you.  · Avoid activities that make your symptoms worse.  · Take brief periods of rest throughout the day.  ? When you rest for longer periods, mix in some mild activity or stretching between periods of rest. This will help to prevent stiffness and pain.  ? Avoid sitting for long periods of time without moving. Get up and move around at least one time each hour.  · Exercise and stretch regularly, as told by your health care provider.  · Do not lift anything that is heavier than 10 lb (4.5 kg) while you have symptoms of sciatica. When you do not have symptoms, you should still avoid heavy lifting, especially repetitive heavy lifting.  · When you lift objects, always use proper lifting technique, which includes:  ? Bending your knees.  ? Keeping the load close to your body.  ? Avoiding twisting.    General instructions  · Maintain a healthy weight. Excess weight puts extra stress on your back.  · Wear supportive, comfortable shoes. Avoid wearing high heels.  · Avoid sleeping on a mattress that is too soft or too hard. A mattress that is firm enough to support your back when you sleep may help to reduce your pain.  · Keep all follow-up visits as told by your health care provider. This is important.  Contact a health care provider if:  · You have pain that:  ? Wakes you up when you are sleeping.  ? Gets worse when you lie down.  ? Is worse than you have experienced in the past.  ? Lasts longer than 4 weeks.  · You have an unexplained weight loss.  Get help right away if:  · You are not able to control when you urinate or have bowel movements (incontinence).  · You have:  ? Weakness in your lower back, pelvis, buttocks, or legs that gets worse.  ? Redness or swelling of your back.  ? A burning sensation when you urinate.  Summary  · Sciatica is pain, numbness, weakness, or tingling along the path of the sciatic nerve.  · This  condition is caused by pressure on the sciatic nerve or pinching of the nerve.  · Sciatica can cause pain, numbness, or tingling in the lower back, legs, hips, and buttocks.  · Treatment often includes rest, exercise, medicines, and applying ice or heat.  This information is not intended to replace advice given to you by your health care provider. Make sure you discuss any questions you have with your health care provider.  Document Revised: 01/06/2020 Document Reviewed: 01/06/2020  [employer] Patient Education © 2021 [employer]    Sciatica Rehab  Ask your health care provider which exercises are safe for you. Do exercises exactly as told by your health care provider and adjust them as directed. It is normal to feel mild stretching, pulling, tightness, or discomfort as you do these exercises. Stop right away if you feel sudden pain or your pain gets worse. Do not begin these exercises until told by your health care provider.  Stretching and range-of-motion exercises  These exercises warm up your muscles and joints and improve the movement and flexibility of your hips and back. These exercises also help to relieve pain, numbness, and tingling.  Sciatic nerve glide  1. Sit in a chair with your head facing down toward your chest. Place your hands behind your back. Let your shoulders slump forward.  2. Slowly straighten one of your legs while you tilt your head back as if you are looking toward the ceiling. Only straighten your leg as far as you can without making your symptoms worse.  3. Hold this position for __________ seconds.  4. Slowly return to the starting position.  5. Repeat with your other leg.  Repeat __________ times. Complete this exercise __________ times a day.  Knee to chest with hip adduction and internal rotation    1. Lie on your back on a firm surface with both legs straight.  2. Bend one of your knees and move it up toward your chest until you feel a gentle stretch in your lower back and buttock.  Then, move your knee toward the shoulder that is on the opposite side from your leg. This is hip adduction and internal rotation.  ? Hold your leg in this position by holding on to the front of your knee.  3. Hold this position for __________ seconds.  4. Slowly return to the starting position.  5. Repeat with your other leg.  Repeat __________ times. Complete this exercise __________ times a day.  Prone extension on elbows    1. Lie on your abdomen on a firm surface. A bed may be too soft for this exercise.  2. Prop yourself up on your elbows.  3. Use your arms to help lift your chest up until you feel a gentle stretch in your abdomen and your lower back.  ? This will place some of your body weight on your elbows. If this is uncomfortable, try stacking pillows under your chest.  ? Your hips should stay down, against the surface that you are lying on. Keep your hip and back muscles relaxed.  4. Hold this position for __________ seconds.  5. Slowly relax your upper body and return to the starting position.  Repeat __________ times. Complete this exercise __________ times a day.  Strengthening exercises  These exercises build strength and endurance in your back. Endurance is the ability to use your muscles for a long time, even after they get tired.  Pelvic tilt  This exercise strengthens the muscles that lie deep in the abdomen.  1. Lie on your back on a firm surface. Bend your knees and keep your feet flat on the floor.  2. Tense your abdominal muscles. Tip your pelvis up toward the ceiling and flatten your lower back into the floor.  ? To help with this exercise, you may place a small towel under your lower back and try to push your back into the towel.  3. Hold this position for __________ seconds.  4. Let your muscles relax completely before you repeat this exercise.  Repeat __________ times. Complete this exercise __________ times a day.  Alternating arm and leg raises    1. Get on your hands and knees on a firm  surface. If you are on a hard floor, you may want to use padding, such as an exercise mat, to cushion your knees.  2. Line up your arms and legs. Your hands should be directly below your shoulders, and your knees should be directly below your hips.  3. Lift your left leg behind you. At the same time, raise your right arm and straighten it in front of you.  ? Do not lift your leg higher than your hip.  ? Do not lift your arm higher than your shoulder.  ? Keep your abdominal and back muscles tight.  ? Keep your hips facing the ground.  ? Do not arch your back.  ? Keep your balance carefully, and do not hold your breath.  4. Hold this position for __________ seconds.  5. Slowly return to the starting position.  6. Repeat with your right leg and your left arm.  Repeat __________ times. Complete this exercise __________ times a day.  Posture and body mechanics  Good posture and healthy body mechanics can help to relieve stress in your body's tissues and joints. Body mechanics refers to the movements and positions of your body while you do your daily activities. Posture is part of body mechanics. Good posture means:  · Your spine is in its natural S-curve position (neutral).  · Your shoulders are pulled back slightly.  · Your head is not tipped forward.  Follow these guidelines to improve your posture and body mechanics in your everyday activities.  Standing    · When standing, keep your spine neutral and your feet about hip width apart. Keep a slight bend in your knees. Your ears, shoulders, and hips should line up.  · When you do a task in which you  one place for a long time, place one foot up on a stable object that is 2-4 inches (5-10 cm) high, such as a footstool. This helps keep your spine neutral.    Sitting    · When sitting, keep your spine neutral and keep your feet flat on the floor. Use a footrest, if necessary, and keep your thighs parallel to the floor. Avoid rounding your shoulders, and avoid  tilting your head forward.  · When working at a desk or a computer, keep your desk at a height where your hands are slightly lower than your elbows. Slide your chair under your desk so you are close enough to maintain good posture.  · When working at a computer, place your monitor at a height where you are looking straight ahead and you do not have to tilt your head forward or downward to look at the screen.    Resting  · When lying down and resting, avoid positions that are most painful for you.  · If you have pain with activities such as sitting, bending, stooping, or squatting, lie in a position in which your body does not bend very much. For example, avoid curling up on your side with your arms and knees near your chest (fetal position).  · If you have pain with activities such as standing for a long time or reaching with your arms, lie with your spine in a neutral position and bend your knees slightly. Try the following positions:  ? Lying on your side with a pillow between your knees.  ? Lying on your back with a pillow under your knees.  Lifting    · When lifting objects, keep your feet at least shoulder width apart and tighten your abdominal muscles.  · Bend your knees and hips and keep your spine neutral. It is important to lift using the strength of your legs, not your back. Do not lock your knees straight out.  · Always ask for help to lift heavy or awkward objects.    This information is not intended to replace advice given to you by your health care provider. Make sure you discuss any questions you have with your health care provider.  Document Revised: 04/10/2020 Document Reviewed: 01/09/2020  [employer] Patient Education © 2021 [employer]

## 2022-01-05 NOTE — PROGRESS NOTES
Chief Complaint  Leg Pain and Back Pain    Damien Avalos presents today for    Leg Pain   Incident onset: over 3 weeks ago  The injury mechanism is unknown. The pain is present in the right thigh, right hip and right leg. The quality of the pain is described as aching, shooting and stabbing. The pain is at a severity of 10/10. The pain is moderate. The pain has been worsening since onset. Associated symptoms include an inability to bear weight, numbness and tingling. Pertinent negatives include no loss of motion or loss of sensation. He reports no foreign bodies present. The symptoms are aggravated by weight bearing and movement. He has tried NSAIDs, acetaminophen, non-weight bearing and rest for the symptoms. The treatment provided no relief.   Back Pain  This is a new problem. Episode onset: 3weeks ago. The problem occurs daily. The problem has been gradually worsening since onset. The pain is present in the thoracic spine. The quality of the pain is described as stabbing, shooting and aching. The pain radiates to the right knee and right thigh. The pain is at a severity of 10/10. The pain is severe. The pain is the same all the time. The symptoms are aggravated by bending, sitting, position and standing. Stiffness is present at night. Associated symptoms include leg pain, numbness and tingling. He has tried heat, ice, bed rest and NSAIDs for the symptoms. The treatment provided significant relief.   Patient reports onset of pain Monday, December 20, was seen on the 21st at urgent care and prescribed prednisone, this offered no relief he went to emergency department on the 25th and was prescribed hydrocodone and cyclobenzaprine this again did not offer any relief, he has been attempting stretches and applying cold packs also without any relief of discomfort.  He reports the pain is through the right buttock all the way down to the top of his foot and at times he will have a sharp stabbing pain in his buttock.   "There is tingling but no actual numbness.    Subjective            Current Outpatient Medications on File Prior to Visit   Medication Sig   • amLODIPine (NORVASC) 10 MG tablet TAKE 1 TABLET BY MOUTH EVERY DAY   • benazepril (LOTENSIN) 40 MG tablet TAKE 1 TABLET BY MOUTH EVERY DAY   • Multiple Vitamins-Minerals (MULTIVITAMIN ADULT) tablet Take 1 each by mouth Daily.     No current facility-administered medications on file prior to visit.       Objective   Vital Signs:   /80   Pulse 77   Temp 98.7 °F (37.1 °C) (Temporal)   Resp 18   Ht 172.7 cm (68\")   Wt 101 kg (221 lb 12.8 oz)   SpO2 99%   BMI 33.72 kg/m²     Physical Exam  Vitals and nursing note reviewed.   Constitutional:       General: He is not in acute distress.     Appearance: He is well-developed.   Eyes:      Conjunctiva/sclera: Conjunctivae normal.      Pupils: Pupils are equal, round, and reactive to light.   Cardiovascular:      Rate and Rhythm: Normal rate and regular rhythm.      Heart sounds: No murmur heard.      Pulmonary:      Effort: Pulmonary effort is normal.      Breath sounds: No wheezing.   Musculoskeletal:         General: Normal range of motion.      Cervical back: Normal range of motion.      Comments: There is tenderness to palpation in right sciatic nerve distribution through the buttock.  There is positive flip test.  Patellar reflexes are 1+ bilaterally, Achilles zero bilaterally   Skin:     General: Skin is warm and dry.   Neurological:      Mental Status: He is alert and oriented to person, place, and time.      Comments: Lower extremity sensation is intact                No results found for: HGBA1C            DATE OF EXAM:  11/1/2021 4:10 PM     PROCEDURE:  XR SPINE LUMBAR COMPLETE 4+VW-     INDICATIONS:  low back pain; M54.42-Lumbago with sciatica, left side     COMPARISON:  No Comparisons Available     TECHNIQUE:   A complete lumbar spine with a minimum of four radiologic views were  obtained.           FINDINGS:  5 " nonrib-bearing lumbar-type vertebral bodies. No pars defects.  Vertebral body height maintained. Mild retrolisthesis of L5 on S1  measures 3 mm. Moderate disc height loss of L5-S1. Mild disc height loss  L4-L5. Moderate facet degenerative change L5-S1.      IMPRESSION:  Degenerative disc disease moderate at L5-S1 and mild L4-L5.  Moderate facet degenerative change L5-S1.     Electronically Signed By-Treasure Herbert MD On:11/1/2021 4:21 PM  This report was finalized on [id number] by  Treasure Herbert MD.s     Assessment and Plan    Diagnoses and all orders for this visit:    1. Acute right-sided low back pain with right-sided sciatica (Primary)  -     gabapentin (Neurontin) 300 MG capsule; 1-2 tablets at hs  Dispense: 60 capsule; Refill: 0  -     ketorolac (TORADOL) injection 30 mg      Persistent right-sided sciatica for 3 weeks which has not improved with a course of prednisone, hydrocodone, or cyclobenzaprine.  He did have relief in November for left-sided sciatica with a dose of Toradol, repeating that today.  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  Do recommend referral to physical therapy, patient declines at this time.  We will have him return to see Dr. Jones in the next week or 2, if symptoms are persisting he may need MRI of lumbar spine and referral to pain management.      There are no discontinued medications.      Follow Up     Return in about 1 week (around 1/12/2022) for Recheck sciatica with Dr Jones.    Patient was given instructions and counseling regarding his condition or for health maintenance advice. Please see specific information pulled into the AVS if appropriate.

## 2022-01-14 ENCOUNTER — OFFICE VISIT (OUTPATIENT)
Dept: FAMILY MEDICINE CLINIC | Facility: CLINIC | Age: 57
End: 2022-01-14

## 2022-01-14 VITALS
SYSTOLIC BLOOD PRESSURE: 142 MMHG | RESPIRATION RATE: 18 BRPM | BODY MASS INDEX: 33.43 KG/M2 | HEART RATE: 81 BPM | WEIGHT: 220.6 LBS | OXYGEN SATURATION: 98 % | TEMPERATURE: 97.1 F | DIASTOLIC BLOOD PRESSURE: 76 MMHG | HEIGHT: 68 IN

## 2022-01-14 DIAGNOSIS — M54.41 ACUTE RIGHT-SIDED LOW BACK PAIN WITH RIGHT-SIDED SCIATICA: Primary | ICD-10-CM

## 2022-01-14 PROCEDURE — 99213 OFFICE O/P EST LOW 20 MIN: CPT | Performed by: FAMILY MEDICINE

## 2022-01-14 RX ORDER — PREDNISONE 20 MG/1
20 TABLET ORAL DAILY
Qty: 20 TABLET | Refills: 0 | Status: SHIPPED | OUTPATIENT
Start: 2022-01-14 | End: 2022-01-27

## 2022-01-14 NOTE — PROGRESS NOTES
Subjective   Damien Avalos is a 57 y.o. male.       Patient here to follow up on leg and back pain. He's tried nsaids, acetaminophen, muscle relaxer, hydrocodone, and home excercises with no improvement.      Leg Pain   The incident occurred more than 1 week ago. The injury mechanism is unknown. The pain is present in the right thigh, right hip and right leg. The quality of the pain is described as aching, shooting and stabbing. The pain is at a severity of 10/10. The pain is moderate. The pain has been worsening since onset. Associated symptoms include an inability to bear weight, numbness and tingling. Pertinent negatives include no loss of motion or loss of sensation. He reports no foreign bodies present. The symptoms are aggravated by weight bearing and movement. He has tried NSAIDs, acetaminophen, non-weight bearing and rest for the symptoms. The treatment provided no relief.   Back Pain  This is a recurrent problem. The current episode started more than 1 month ago (3weeks ago). The problem occurs daily. The problem has been gradually worsening since onset. The pain is present in the thoracic spine. The quality of the pain is described as stabbing, shooting and aching. The pain radiates to the right knee and right thigh. The pain is at a severity of 10/10. The pain is severe. The pain is the same all the time. The symptoms are aggravated by bending, sitting, position and standing. Stiffness is present at night. Associated symptoms include leg pain, numbness and tingling. Pertinent negatives include no abdominal pain, chest pain, dysuria, fever or weakness. He has tried heat, ice, bed rest, NSAIDs, muscle relaxant and home exercises for the symptoms. The treatment provided significant relief.        The following portions of the patient's history were reviewed and updated as appropriate: allergies, current medications, past family history, past medical history, past social history, past surgical history and  "problem list.    Patient Active Problem List   Diagnosis   • Arthralgia   • Chest pain   • Dyslipidemia   • Essential hypertension   • Obesity       Current Outpatient Medications on File Prior to Visit   Medication Sig Dispense Refill   • amLODIPine (NORVASC) 10 MG tablet TAKE 1 TABLET BY MOUTH EVERY DAY 90 tablet 3   • benazepril (LOTENSIN) 40 MG tablet TAKE 1 TABLET BY MOUTH EVERY DAY 90 tablet 3   • gabapentin (Neurontin) 300 MG capsule 1-2 tablets at hs 60 capsule 0   • Multiple Vitamins-Minerals (MULTIVITAMIN ADULT) tablet Take 1 each by mouth Daily.       No current facility-administered medications on file prior to visit.     Current outpatient and discharge medications have been reconciled for the patient.  Reviewed by: Brian Jones MD      No Known Allergies    Review of Systems   Constitutional: Negative for activity change, appetite change, fatigue and fever.   HENT: Negative for ear pain, swollen glands and voice change.    Eyes: Negative for visual disturbance.   Respiratory: Negative for shortness of breath and wheezing.    Cardiovascular: Negative for chest pain and leg swelling.   Gastrointestinal: Negative for abdominal pain, blood in stool, constipation, diarrhea, nausea and vomiting.   Endocrine: Negative for polydipsia and polyuria.   Genitourinary: Negative for dysuria, frequency and hematuria.   Musculoskeletal: Positive for back pain. Negative for joint swelling, neck pain and neck stiffness.   Skin: Negative for rash and wound.   Neurological: Positive for tingling and numbness. Negative for weakness and headache.   Psychiatric/Behavioral: Negative for suicidal ideas and depressed mood.     I have reviewed and confirmed the accuracy of the ROS as documented by the MA/LPN/RN Brian Jones MD    Objective   Visit Vitals  /76 (BP Location: Right arm, Patient Position: Sitting, Cuff Size: Adult)   Pulse 81   Temp 97.1 °F (36.2 °C) (Temporal)   Resp 18   Ht 172.7 cm (68\") "   Wt 100 kg (220 lb 9.6 oz)   SpO2 98%   BMI 33.54 kg/m²       Physical Exam  Constitutional:       Appearance: He is well-developed.   HENT:      Head: Normocephalic and atraumatic.      Right Ear: External ear normal.      Left Ear: External ear normal.      Nose: Nose normal.   Eyes:      Pupils: Pupils are equal, round, and reactive to light.   Cardiovascular:      Rate and Rhythm: Normal rate and regular rhythm.      Heart sounds: Normal heart sounds.   Pulmonary:      Effort: Pulmonary effort is normal.      Breath sounds: Normal breath sounds.   Abdominal:      General: Bowel sounds are normal.      Palpations: Abdomen is soft.   Musculoskeletal:         General: Normal range of motion.      Cervical back: Normal range of motion and neck supple.   Skin:     General: Skin is warm and dry.   Neurological:      Mental Status: He is alert and oriented to person, place, and time.   Psychiatric:         Behavior: Behavior normal.         Thought Content: Thought content normal.         Judgment: Judgment normal.       Assessment/Plan .      Diagnoses and all orders for this visit:    1. Acute right-sided low back pain with right-sided sciatica (Primary)  -     MRI Lumbar Spine Without Contrast; Future  -     predniSONE (DELTASONE) 20 MG tablet; Take 1 tablet by mouth Daily for 13 days. TID x 3 days, BID x 3 days, QD x 3 days, 1/2 tab daily x 4 days  Dispense: 20 tablet; Refill: 0     persistent sciatica, not improved. Trial of steroids,  Will get MRI, consider injections vs spine surgeon referral depending on results of MRI       I wore protective equipment throughout this patient encounter to include mask and gloves. Hand hygiene was performed before donning protective equipment and after removal when leaving the room

## 2022-01-19 ENCOUNTER — TELEPHONE (OUTPATIENT)
Dept: FAMILY MEDICINE CLINIC | Facility: CLINIC | Age: 57
End: 2022-01-19

## 2022-01-19 NOTE — TELEPHONE ENCOUNTER
Caller: MADAY PAPPAS    Relationship: Emergency Contact    Best call back number: [phone]    What was the call regarding: PLEASE CALL PATIENT'S WIFE REGARDING MRI, DID IT GO THROUGH TO THE INSURANCE YET AND IS IT READY TO BE SCHEDULED?    Do you require a callback: YES

## 2022-01-20 NOTE — TELEPHONE ENCOUNTER
Called patient, left message on machine we do not have approval from insurance yet. Once approved scheduling will call to set up.

## 2022-01-25 ENCOUNTER — TELEPHONE (OUTPATIENT)
Dept: FAMILY MEDICINE CLINIC | Facility: CLINIC | Age: 57
End: 2022-01-25

## 2022-01-25 DIAGNOSIS — M54.41 CHRONIC RIGHT-SIDED LOW BACK PAIN WITH RIGHT-SIDED SCIATICA: Primary | ICD-10-CM

## 2022-01-25 DIAGNOSIS — G89.29 CHRONIC RIGHT-SIDED LOW BACK PAIN WITH RIGHT-SIDED SCIATICA: Primary | ICD-10-CM

## 2022-01-25 NOTE — TELEPHONE ENCOUNTER
Spoke to Aurelia, patients wife and explained that insurance is denying MRI for back. Asked if he has done any physical thearpy. He has not. They would like an order for [facility]. Order mailed to patient.

## 2022-05-14 DIAGNOSIS — I10 HYPERTENSION, BENIGN: ICD-10-CM

## 2022-05-16 RX ORDER — BENAZEPRIL HYDROCHLORIDE 40 MG/1
TABLET, FILM COATED ORAL
Qty: 90 TABLET | Refills: 0 | Status: SHIPPED | OUTPATIENT
Start: 2022-05-16 | End: 2022-06-23 | Stop reason: SDUPTHER

## 2022-06-10 NOTE — PROGRESS NOTES
Medical Examination    Subjective   Damien Avalos is a 57 y.o. male who presents today for a  fitness determination physical exam. The patient reports no problems.  The following portions of the patient's history were reviewed and updated as appropriate: allergies, current medications, past family history, past medical history, past social history, past surgical history and problem list.  Review of Systems  A comprehensive review of systems was negative.    Objective    Vision:   Visual Acuity Screening    Right eye Left eye Both eyes   Without correction: 20/25 20/30 20/30   With correction:          Applicant can recognize and distinguish among traffic control signals and devices showing standard red, green, and ana colors.  Applicant has peripheral vision to 90 degrees in each eye.         Monocular Vision?: No      Hearing:  Applicant can distinguish forced whisper at a distance of 5 feet with both ears.         Physical Exam  Constitutional:       Appearance: He is well-developed.   HENT:      Head: Normocephalic and atraumatic.      Right Ear: External ear normal.      Left Ear: External ear normal.      Nose: Nose normal.   Eyes:      Pupils: Pupils are equal, round, and reactive to light.   Cardiovascular:      Rate and Rhythm: Normal rate and regular rhythm.      Heart sounds: Normal heart sounds.   Pulmonary:      Effort: Pulmonary effort is normal.      Breath sounds: Normal breath sounds.   Abdominal:      General: Bowel sounds are normal.      Palpations: Abdomen is soft.   Musculoskeletal:         General: Normal range of motion.      Cervical back: Normal range of motion and neck supple.   Skin:     General: Skin is warm and dry.   Neurological:      Mental Status: He is alert and oriented to person, place, and time.   Psychiatric:         Behavior: Behavior normal.         Thought Content: Thought content normal.         Judgment: Judgment normal.           Labs:  Lab Results   Component Value Date    SPECGRAV 1.020 06/13/2022    BILIRUBINUR Negative 06/13/2022    GLUCOSEU 87 11/01/2018       Assessment & Plan   Healthy male exam.   Meets standards in 49 .41;  qualifies for 2 year certificate.     Medical examiners certificate completed and printed.  Return as needed.

## 2022-06-13 ENCOUNTER — CLINICAL SUPPORT (OUTPATIENT)
Dept: FAMILY MEDICINE CLINIC | Facility: CLINIC | Age: 57
End: 2022-06-13

## 2022-06-13 VITALS
OXYGEN SATURATION: 99 % | HEART RATE: 70 BPM | TEMPERATURE: 98.4 F | HEIGHT: 68 IN | SYSTOLIC BLOOD PRESSURE: 132 MMHG | RESPIRATION RATE: 18 BRPM | BODY MASS INDEX: 32.83 KG/M2 | DIASTOLIC BLOOD PRESSURE: 74 MMHG | WEIGHT: 216.6 LBS

## 2022-06-13 DIAGNOSIS — Z02.4 ENCOUNTER FOR CDL (COMMERCIAL DRIVING LICENSE) EXAM: Primary | ICD-10-CM

## 2022-06-13 LAB
BILIRUB BLD-MCNC: NEGATIVE MG/DL
CLARITY, POC: CLEAR
COLOR UR: YELLOW
EXPIRATION DATE: ABNORMAL
GLUCOSE UR STRIP-MCNC: NEGATIVE MG/DL
KETONES UR QL: NEGATIVE
LEUKOCYTE EST, POC: NEGATIVE
Lab: ABNORMAL
NITRITE UR-MCNC: NEGATIVE MG/ML
PH UR: 5 [PH] (ref 5–8)
PROT UR STRIP-MCNC: NEGATIVE MG/DL
RBC # UR STRIP: ABNORMAL /UL
SP GR UR: 1.02 (ref 1–1.03)
UROBILINOGEN UR QL: NORMAL

## 2022-06-13 PROCEDURE — 81003 URINALYSIS AUTO W/O SCOPE: CPT | Performed by: FAMILY MEDICINE

## 2022-06-13 PROCEDURE — DOTPHY: Performed by: FAMILY MEDICINE

## 2022-06-23 ENCOUNTER — LAB (OUTPATIENT)
Dept: LAB | Facility: HOSPITAL | Age: 57
End: 2022-06-23

## 2022-06-23 ENCOUNTER — OFFICE VISIT (OUTPATIENT)
Dept: FAMILY MEDICINE CLINIC | Facility: CLINIC | Age: 57
End: 2022-06-23

## 2022-06-23 VITALS
WEIGHT: 214.4 LBS | DIASTOLIC BLOOD PRESSURE: 82 MMHG | SYSTOLIC BLOOD PRESSURE: 130 MMHG | TEMPERATURE: 97.9 F | OXYGEN SATURATION: 98 % | HEIGHT: 68 IN | HEART RATE: 80 BPM | BODY MASS INDEX: 32.49 KG/M2 | RESPIRATION RATE: 18 BRPM

## 2022-06-23 DIAGNOSIS — Z00.00 ANNUAL PHYSICAL EXAM: Primary | ICD-10-CM

## 2022-06-23 DIAGNOSIS — Z00.00 ROUTINE GENERAL MEDICAL EXAMINATION AT A HEALTH CARE FACILITY: Primary | ICD-10-CM

## 2022-06-23 DIAGNOSIS — Z12.5 PROSTATE CANCER SCREENING: ICD-10-CM

## 2022-06-23 DIAGNOSIS — Z11.59 NEED FOR HEPATITIS C SCREENING TEST: ICD-10-CM

## 2022-06-23 DIAGNOSIS — E78.5 DYSLIPIDEMIA: ICD-10-CM

## 2022-06-23 DIAGNOSIS — I10 HYPERTENSION, BENIGN: ICD-10-CM

## 2022-06-23 DIAGNOSIS — E66.09 CLASS 1 OBESITY DUE TO EXCESS CALORIES WITH SERIOUS COMORBIDITY AND BODY MASS INDEX (BMI) OF 32.0 TO 32.9 IN ADULT: ICD-10-CM

## 2022-06-23 DIAGNOSIS — I10 ESSENTIAL HYPERTENSION: ICD-10-CM

## 2022-06-23 PROBLEM — R07.9 CHEST PAIN: Status: RESOLVED | Noted: 2018-04-20 | Resolved: 2022-06-23

## 2022-06-23 PROBLEM — E66.811 CLASS 1 OBESITY DUE TO EXCESS CALORIES WITH SERIOUS COMORBIDITY AND BODY MASS INDEX (BMI) OF 32.0 TO 32.9 IN ADULT: Status: RESOLVED | Noted: 2020-02-25 | Resolved: 2022-06-23

## 2022-06-23 PROBLEM — E66.811 CLASS 1 OBESITY DUE TO EXCESS CALORIES WITH SERIOUS COMORBIDITY AND BODY MASS INDEX (BMI) OF 32.0 TO 32.9 IN ADULT: Status: ACTIVE | Noted: 2020-02-25

## 2022-06-23 PROCEDURE — 99396 PREV VISIT EST AGE 40-64: CPT | Performed by: FAMILY MEDICINE

## 2022-06-23 PROCEDURE — G0103 PSA SCREENING: HCPCS | Performed by: FAMILY MEDICINE

## 2022-06-23 PROCEDURE — 86803 HEPATITIS C AB TEST: CPT | Performed by: FAMILY MEDICINE

## 2022-06-23 PROCEDURE — 80061 LIPID PANEL: CPT

## 2022-06-23 PROCEDURE — 36415 COLL VENOUS BLD VENIPUNCTURE: CPT | Performed by: FAMILY MEDICINE

## 2022-06-23 PROCEDURE — 80050 GENERAL HEALTH PANEL: CPT | Performed by: FAMILY MEDICINE

## 2022-06-23 RX ORDER — AMLODIPINE BESYLATE 10 MG/1
10 TABLET ORAL DAILY
Qty: 90 TABLET | Refills: 3 | Status: SHIPPED | OUTPATIENT
Start: 2022-06-23

## 2022-06-23 RX ORDER — AMLODIPINE BESYLATE 10 MG/1
TABLET ORAL
Qty: 30 TABLET | Refills: 0 | Status: SHIPPED | OUTPATIENT
Start: 2022-06-23 | End: 2022-06-23 | Stop reason: SDUPTHER

## 2022-06-23 RX ORDER — BENAZEPRIL HYDROCHLORIDE 40 MG/1
40 TABLET, FILM COATED ORAL DAILY
Qty: 90 TABLET | Refills: 3 | Status: SHIPPED | OUTPATIENT
Start: 2022-06-23

## 2022-06-23 NOTE — PROGRESS NOTES
Subjective   Damien Avalos is a 57 y.o. male.   Chief Complaint   Patient presents with   • Annual Exam   • Hypertension       The patient is here: for coordination of medical care and annual wellness examination.    Patient has: moderate activity with work/home activities, good appetite, feels well with minor complaints, decreased energy level and is sleeping poorly    ANNUAL PHYSICAL Never done  COVID-19 Vaccine(1) Never done  ZOSTER VACCINE(1 of 2) Never done  HEPATITIS C SCREENING Never done  INFLUENZA VACCINE due on 10/01/2022  TDAP/TD VACCINES(3 - Td or Tdap) due on 10/05/2025  COLORECTAL CANCER SCREENING due on 12/14/2028  Pneumococcal Vaccine 0-64 Aged Out  Current pain scale 0/10.      Hypertension  This is a chronic problem. The current episode started more than 1 year ago. Pertinent negatives include no chest pain, neck pain or shortness of breath. Risk factors for coronary artery disease include male gender and obesity. Current antihypertension treatment includes calcium channel blockers and ACE inhibitors.        The following portions of the patient's history were reviewed and updated as appropriate: allergies, current medications, past family history, past medical history, past social history, past surgical history and problem list.    Patient Active Problem List   Diagnosis   • Arthralgia   • Dyslipidemia   • Essential hypertension       Current Outpatient Medications on File Prior to Visit   Medication Sig Dispense Refill   • Multiple Vitamins-Minerals (MULTIVITAMIN ADULT) tablet Take 1 each by mouth Daily.     • [DISCONTINUED] benazepril (LOTENSIN) 40 MG tablet TAKE 1 TABLET BY MOUTH EVERY DAY 90 tablet 0   • [DISCONTINUED] gabapentin (Neurontin) 300 MG capsule 1-2 tablets at hs 60 capsule 0   • [DISCONTINUED] amLODIPine (NORVASC) 10 MG tablet TAKE 1 TABLET BY MOUTH EVERY DAY 90 tablet 3     No current facility-administered medications on file prior to visit.     Current outpatient and discharge  "medications have been reconciled for the patient.  Reviewed by: Brian Jones MD      No Known Allergies    Review of Systems   Constitutional: Negative for activity change, appetite change, fatigue and fever.   HENT: Negative for ear pain, swollen glands and voice change.    Eyes: Negative for visual disturbance.   Respiratory: Negative for shortness of breath and wheezing.    Cardiovascular: Negative for chest pain and leg swelling.   Gastrointestinal: Negative for abdominal pain, blood in stool, constipation, diarrhea, nausea and vomiting.   Endocrine: Negative for polydipsia and polyuria.   Genitourinary: Negative for dysuria, frequency and hematuria.   Musculoskeletal: Negative for joint swelling, neck pain and neck stiffness.   Skin: Negative for rash and wound.   Neurological: Negative for weakness, numbness and headache.   Psychiatric/Behavioral: Negative for suicidal ideas and depressed mood.     I have reviewed and confirmed the accuracy of the ROS as documented by the MA/LPN/RN Brian Jones MD    Objective   Visit Vitals  /82 (BP Location: Right arm, Patient Position: Sitting, Cuff Size: Adult)   Pulse 80   Temp 97.9 °F (36.6 °C)   Resp 18   Ht 172.7 cm (68\")   Wt 97.3 kg (214 lb 6.4 oz)   SpO2 98%   BMI 32.60 kg/m²       Physical Exam  Constitutional:       Appearance: He is well-developed.   HENT:      Head: Normocephalic and atraumatic.      Right Ear: External ear normal.      Left Ear: External ear normal.      Nose: Nose normal.   Eyes:      Pupils: Pupils are equal, round, and reactive to light.   Cardiovascular:      Rate and Rhythm: Normal rate and regular rhythm.      Heart sounds: Normal heart sounds.   Pulmonary:      Effort: Pulmonary effort is normal.      Breath sounds: Normal breath sounds.   Abdominal:      General: Bowel sounds are normal.      Palpations: Abdomen is soft.   Musculoskeletal:         General: Normal range of motion.      Cervical back: Normal " range of motion and neck supple.   Skin:     General: Skin is warm and dry.   Neurological:      Mental Status: He is alert and oriented to person, place, and time.   Psychiatric:         Behavior: Behavior normal.         Thought Content: Thought content normal.         Judgment: Judgment normal.       Derm Physical Exam    Diagnoses and all orders for this visit:    1. Annual physical exam (Primary)  -     CBC & Differential  -     Comprehensive Metabolic Panel  -     Lipid Panel With / Chol / HDL Ratio  -     PSA Screen  -     TSH  -     Hepatitis C Antibody  -     amLODIPine (NORVASC) 10 MG tablet; Take 1 tablet by mouth Daily.  Dispense: 90 tablet; Refill: 3  -     benazepril (LOTENSIN) 40 MG tablet; Take 1 tablet by mouth Daily.  Dispense: 90 tablet; Refill: 3    2. Essential hypertension  Overview:  stable    Orders:  -     CBC & Differential  -     Comprehensive Metabolic Panel    3. Class 1 obesity due to excess calories with serious comorbidity and body mass index (BMI) of 32.0 to 32.9 in adult    4. Need for hepatitis C screening test  -     Hepatitis C Antibody    5. Hypertension, benign  Comments:  Blood pressure stable on current medical therapy.  Orders:  -     amLODIPine (NORVASC) 10 MG tablet; Take 1 tablet by mouth Daily.  Dispense: 90 tablet; Refill: 3  -     benazepril (LOTENSIN) 40 MG tablet; Take 1 tablet by mouth Daily.  Dispense: 90 tablet; Refill: 3    6. Prostate cancer screening  -     PSA Screen    7. Dyslipidemia  -     Comprehensive Metabolic Panel  -     Lipid Panel With / Chol / HDL Ratio  -     TSH   Discussed anticipatory guidance, diet, exercise, and weight loss.  Discussed safety and routine screening examinations.  Discussed self-examinations.  BMI is >= 30 and <35. (Class 1 Obesity). The following options were offered after discussion;: weight loss educational material (shared in after visit summary)     Damien DEL CID Robbie  reports that he has never smoked. He has never used  smokeless tobacco..     Follow-up for routine health maintenance as indicated.      I wore protective equipment throughout this patient encounter to include mask and eye protection. Hand hygiene was performed before donning protective equipment and after removal when leaving the room

## 2022-06-24 ENCOUNTER — TELEPHONE (OUTPATIENT)
Dept: FAMILY MEDICINE CLINIC | Facility: CLINIC | Age: 57
End: 2022-06-24

## 2022-06-24 LAB
ALBUMIN SERPL-MCNC: 4.4 G/DL (ref 3.5–5.2)
ALBUMIN/GLOB SERPL: 1.4 G/DL
ALP SERPL-CCNC: 70 U/L (ref 39–117)
ALT SERPL W P-5'-P-CCNC: 18 U/L (ref 1–41)
ANION GAP SERPL CALCULATED.3IONS-SCNC: 10 MMOL/L (ref 5–15)
AST SERPL-CCNC: 15 U/L (ref 1–40)
BASOPHILS # BLD AUTO: 0.06 10*3/MM3 (ref 0–0.2)
BASOPHILS NFR BLD AUTO: 0.7 % (ref 0–1.5)
BILIRUB SERPL-MCNC: 0.8 MG/DL (ref 0–1.2)
BUN SERPL-MCNC: 17 MG/DL (ref 6–20)
BUN/CREAT SERPL: 17.9 (ref 7–25)
CALCIUM SPEC-SCNC: 9.4 MG/DL (ref 8.6–10.5)
CHLORIDE SERPL-SCNC: 104 MMOL/L (ref 98–107)
CHOLEST SERPL-MCNC: 181 MG/DL (ref 0–200)
CO2 SERPL-SCNC: 25 MMOL/L (ref 22–29)
CREAT SERPL-MCNC: 0.95 MG/DL (ref 0.76–1.27)
DEPRECATED RDW RBC AUTO: 39.8 FL (ref 37–54)
EGFRCR SERPLBLD CKD-EPI 2021: 93.4 ML/MIN/1.73
EOSINOPHIL # BLD AUTO: 0.06 10*3/MM3 (ref 0–0.4)
EOSINOPHIL NFR BLD AUTO: 0.7 % (ref 0.3–6.2)
ERYTHROCYTE [DISTWIDTH] IN BLOOD BY AUTOMATED COUNT: 12.8 % (ref 12.3–15.4)
GLOBULIN UR ELPH-MCNC: 3.1 GM/DL
GLUCOSE SERPL-MCNC: 88 MG/DL (ref 65–99)
HCT VFR BLD AUTO: 47.7 % (ref 37.5–51)
HCV AB SER DONR QL: NORMAL
HDLC SERPL-MCNC: 31 MG/DL (ref 40–60)
HGB BLD-MCNC: 16.5 G/DL (ref 13–17.7)
IMM GRANULOCYTES # BLD AUTO: 0.03 10*3/MM3 (ref 0–0.05)
IMM GRANULOCYTES NFR BLD AUTO: 0.3 % (ref 0–0.5)
LDLC SERPL CALC-MCNC: 109 MG/DL (ref 0–100)
LDLC/HDLC SERPL: 3.34 {RATIO}
LYMPHOCYTES # BLD AUTO: 3.16 10*3/MM3 (ref 0.7–3.1)
LYMPHOCYTES NFR BLD AUTO: 34.3 % (ref 19.6–45.3)
MCH RBC QN AUTO: 30.2 PG (ref 26.6–33)
MCHC RBC AUTO-ENTMCNC: 34.6 G/DL (ref 31.5–35.7)
MCV RBC AUTO: 87.2 FL (ref 79–97)
MONOCYTES # BLD AUTO: 0.77 10*3/MM3 (ref 0.1–0.9)
MONOCYTES NFR BLD AUTO: 8.4 % (ref 5–12)
NEUTROPHILS NFR BLD AUTO: 5.12 10*3/MM3 (ref 1.7–7)
NEUTROPHILS NFR BLD AUTO: 55.6 % (ref 42.7–76)
NRBC BLD AUTO-RTO: 0 /100 WBC (ref 0–0.2)
PLATELET # BLD AUTO: 274 10*3/MM3 (ref 140–450)
PMV BLD AUTO: 10.2 FL (ref 6–12)
POTASSIUM SERPL-SCNC: 4.1 MMOL/L (ref 3.5–5.2)
PROT SERPL-MCNC: 7.5 G/DL (ref 6–8.5)
PSA SERPL-MCNC: 0.7 NG/ML (ref 0–4)
RBC # BLD AUTO: 5.47 10*6/MM3 (ref 4.14–5.8)
SODIUM SERPL-SCNC: 139 MMOL/L (ref 136–145)
TRIGL SERPL-MCNC: 233 MG/DL (ref 0–150)
TSH SERPL DL<=0.05 MIU/L-ACNC: 2.55 UIU/ML (ref 0.27–4.2)
VLDLC SERPL-MCNC: 41 MG/DL (ref 5–40)
WBC NRBC COR # BLD: 9.2 10*3/MM3 (ref 3.4–10.8)

## 2022-06-24 NOTE — TELEPHONE ENCOUNTER
----- Message from Brian Jones MD sent at 6/24/2022  8:28 AM EDT -----  Please notify patient that labs are stable/looked ok.

## 2023-08-31 DIAGNOSIS — Z00.00 ANNUAL PHYSICAL EXAM: ICD-10-CM

## 2023-08-31 DIAGNOSIS — I10 HYPERTENSION, BENIGN: ICD-10-CM

## 2023-08-31 RX ORDER — AMLODIPINE BESYLATE 10 MG/1
TABLET ORAL
Qty: 90 TABLET | Refills: 3 | OUTPATIENT
Start: 2023-08-31

## 2023-12-29 ENCOUNTER — HOSPITAL ENCOUNTER (OUTPATIENT)
Dept: CARDIOLOGY | Facility: HOSPITAL | Age: 58
Discharge: HOME | End: 2023-12-29
Payer: COMMERCIAL

## 2023-12-29 ENCOUNTER — TRANSCRIBE ORDERS (OUTPATIENT)
Dept: ADMINISTRATIVE | Facility: HOSPITAL | Age: 58
End: 2023-12-29
Payer: COMMERCIAL

## 2023-12-29 DIAGNOSIS — R52 PAIN: ICD-10-CM

## 2023-12-29 DIAGNOSIS — R52 PAIN: Primary | ICD-10-CM

## 2023-12-29 DIAGNOSIS — R60.9 SWELLING: ICD-10-CM

## 2023-12-29 LAB
BH CV LOWER VASCULAR LEFT COMMON FEMORAL AUGMENT: NORMAL
BH CV LOWER VASCULAR LEFT COMMON FEMORAL COMPETENT: NORMAL
BH CV LOWER VASCULAR LEFT COMMON FEMORAL COMPRESS: NORMAL
BH CV LOWER VASCULAR LEFT COMMON FEMORAL PHASIC: NORMAL
BH CV LOWER VASCULAR LEFT COMMON FEMORAL SPONT: NORMAL
BH CV LOWER VASCULAR RIGHT COMMON FEMORAL AUGMENT: NORMAL
BH CV LOWER VASCULAR RIGHT COMMON FEMORAL COMPETENT: NORMAL
BH CV LOWER VASCULAR RIGHT COMMON FEMORAL COMPRESS: NORMAL
BH CV LOWER VASCULAR RIGHT COMMON FEMORAL PHASIC: NORMAL
BH CV LOWER VASCULAR RIGHT COMMON FEMORAL SPONT: NORMAL
BH CV LOWER VASCULAR RIGHT DISTAL FEMORAL COMPRESS: NORMAL
BH CV LOWER VASCULAR RIGHT GASTRONEMIUS COMPRESS: NORMAL
BH CV LOWER VASCULAR RIGHT GREATER SAPH AK COMPRESS: NORMAL
BH CV LOWER VASCULAR RIGHT GREATER SAPH BK COMPRESS: NORMAL
BH CV LOWER VASCULAR RIGHT LESSER SAPH COMPRESS: NORMAL
BH CV LOWER VASCULAR RIGHT MID FEMORAL AUGMENT: NORMAL
BH CV LOWER VASCULAR RIGHT MID FEMORAL COMPETENT: NORMAL
BH CV LOWER VASCULAR RIGHT MID FEMORAL COMPRESS: NORMAL
BH CV LOWER VASCULAR RIGHT MID FEMORAL PHASIC: NORMAL
BH CV LOWER VASCULAR RIGHT MID FEMORAL SPONT: NORMAL
BH CV LOWER VASCULAR RIGHT PERONEAL COMPRESS: NORMAL
BH CV LOWER VASCULAR RIGHT POPLITEAL AUGMENT: NORMAL
BH CV LOWER VASCULAR RIGHT POPLITEAL COMPETENT: NORMAL
BH CV LOWER VASCULAR RIGHT POPLITEAL COMPRESS: NORMAL
BH CV LOWER VASCULAR RIGHT POPLITEAL PHASIC: NORMAL
BH CV LOWER VASCULAR RIGHT POPLITEAL SPONT: NORMAL
BH CV LOWER VASCULAR RIGHT POSTERIOR TIBIAL COMPRESS: NORMAL
BH CV LOWER VASCULAR RIGHT PROXIMAL FEMORAL COMPRESS: NORMAL
BH CV LOWER VASCULAR RIGHT SAPHENOFEMORAL JUNCTION COMPRESS: NORMAL
BH CV VAS PRELIMINARY FINDINGS SCRIPTING: 1

## 2023-12-29 PROCEDURE — 93971 EXTREMITY STUDY: CPT

## 2024-01-01 ENCOUNTER — APPOINTMENT (OUTPATIENT)
Dept: GENERAL RADIOLOGY | Facility: HOSPITAL | Age: 59
End: 2024-01-01
Payer: COMMERCIAL

## 2024-01-01 ENCOUNTER — HOSPITAL ENCOUNTER (INPATIENT)
Facility: HOSPITAL | Age: 59
LOS: 2 days | Discharge: HOME HEALTH | End: 2024-01-05
Attending: EMERGENCY MEDICINE | Admitting: INTERNAL MEDICINE
Payer: COMMERCIAL

## 2024-01-01 DIAGNOSIS — L08.9 WOUND INFECTION: ICD-10-CM

## 2024-01-01 DIAGNOSIS — T14.8XXA WOUND INFECTION: ICD-10-CM

## 2024-01-01 DIAGNOSIS — L03.115 CELLULITIS OF RIGHT LOWER EXTREMITY: Primary | ICD-10-CM

## 2024-01-01 PROBLEM — L03.90 CELLULITIS: Status: ACTIVE | Noted: 2024-01-01

## 2024-01-01 LAB
ANION GAP SERPL CALCULATED.3IONS-SCNC: 11 MMOL/L (ref 5–15)
BASOPHILS # BLD AUTO: 0.1 10*3/MM3 (ref 0–0.2)
BASOPHILS NFR BLD AUTO: 0.5 % (ref 0–1.5)
BUN SERPL-MCNC: 23 MG/DL (ref 6–20)
BUN/CREAT SERPL: 19.8 (ref 7–25)
CALCIUM SPEC-SCNC: 9.6 MG/DL (ref 8.6–10.5)
CHLORIDE SERPL-SCNC: 106 MMOL/L (ref 98–107)
CO2 SERPL-SCNC: 25 MMOL/L (ref 22–29)
CREAT SERPL-MCNC: 1.16 MG/DL (ref 0.76–1.27)
CRP SERPL-MCNC: 15.98 MG/DL (ref 0–0.5)
DEPRECATED RDW RBC AUTO: 43.3 FL (ref 37–54)
EGFRCR SERPLBLD CKD-EPI 2021: 73 ML/MIN/1.73
EOSINOPHIL # BLD AUTO: 0 10*3/MM3 (ref 0–0.4)
EOSINOPHIL NFR BLD AUTO: 0 % (ref 0.3–6.2)
ERYTHROCYTE [DISTWIDTH] IN BLOOD BY AUTOMATED COUNT: 13.7 % (ref 12.3–15.4)
ERYTHROCYTE [SEDIMENTATION RATE] IN BLOOD: 27 MM/HR (ref 0–20)
GLUCOSE SERPL-MCNC: 105 MG/DL (ref 65–99)
HCT VFR BLD AUTO: 42.4 % (ref 37.5–51)
HGB BLD-MCNC: 14.5 G/DL (ref 13–17.7)
LYMPHOCYTES # BLD AUTO: 1.9 10*3/MM3 (ref 0.7–3.1)
LYMPHOCYTES NFR BLD AUTO: 11.7 % (ref 19.6–45.3)
MCH RBC QN AUTO: 31 PG (ref 26.6–33)
MCHC RBC AUTO-ENTMCNC: 34.2 G/DL (ref 31.5–35.7)
MCV RBC AUTO: 90.7 FL (ref 79–97)
MONOCYTES # BLD AUTO: 1.3 10*3/MM3 (ref 0.1–0.9)
MONOCYTES NFR BLD AUTO: 8.2 % (ref 5–12)
MRSA DNA SPEC QL NAA+PROBE: NORMAL
NEUTROPHILS NFR BLD AUTO: 12.9 10*3/MM3 (ref 1.7–7)
NEUTROPHILS NFR BLD AUTO: 79.6 % (ref 42.7–76)
NRBC BLD AUTO-RTO: 0 /100 WBC (ref 0–0.2)
PLATELET # BLD AUTO: 259 10*3/MM3 (ref 140–450)
PMV BLD AUTO: 7.8 FL (ref 6–12)
POTASSIUM SERPL-SCNC: 4.4 MMOL/L (ref 3.5–5.2)
PROCALCITONIN SERPL-MCNC: 0.09 NG/ML (ref 0–0.25)
RBC # BLD AUTO: 4.67 10*6/MM3 (ref 4.14–5.8)
SODIUM SERPL-SCNC: 142 MMOL/L (ref 136–145)
WBC NRBC COR # BLD AUTO: 16.3 10*3/MM3 (ref 3.4–10.8)

## 2024-01-01 PROCEDURE — 87040 BLOOD CULTURE FOR BACTERIA: CPT | Performed by: NURSE PRACTITIONER

## 2024-01-01 PROCEDURE — 86140 C-REACTIVE PROTEIN: CPT | Performed by: NURSE PRACTITIONER

## 2024-01-01 PROCEDURE — 80048 BASIC METABOLIC PNL TOTAL CA: CPT | Performed by: NURSE PRACTITIONER

## 2024-01-01 PROCEDURE — G0378 HOSPITAL OBSERVATION PER HR: HCPCS

## 2024-01-01 PROCEDURE — 25010000002 KETOROLAC TROMETHAMINE PER 15 MG: Performed by: NURSE PRACTITIONER

## 2024-01-01 PROCEDURE — 85025 COMPLETE CBC W/AUTO DIFF WBC: CPT | Performed by: NURSE PRACTITIONER

## 2024-01-01 PROCEDURE — 84145 PROCALCITONIN (PCT): CPT | Performed by: NURSE PRACTITIONER

## 2024-01-01 PROCEDURE — 25010000002 CEFTRIAXONE PER 250 MG: Performed by: NURSE PRACTITIONER

## 2024-01-01 PROCEDURE — 87641 MR-STAPH DNA AMP PROBE: CPT | Performed by: NURSE PRACTITIONER

## 2024-01-01 PROCEDURE — 36415 COLL VENOUS BLD VENIPUNCTURE: CPT

## 2024-01-01 PROCEDURE — 25010000002 DEXAMETHASONE PER 1 MG: Performed by: NURSE PRACTITIONER

## 2024-01-01 PROCEDURE — 99285 EMERGENCY DEPT VISIT HI MDM: CPT

## 2024-01-01 PROCEDURE — 85652 RBC SED RATE AUTOMATED: CPT | Performed by: NURSE PRACTITIONER

## 2024-01-01 PROCEDURE — 73562 X-RAY EXAM OF KNEE 3: CPT

## 2024-01-01 RX ORDER — ONDANSETRON 2 MG/ML
4 INJECTION INTRAMUSCULAR; INTRAVENOUS EVERY 6 HOURS PRN
Status: DISCONTINUED | OUTPATIENT
Start: 2024-01-01 | End: 2024-01-02

## 2024-01-01 RX ORDER — ACETAMINOPHEN 325 MG/1
650 TABLET ORAL EVERY 6 HOURS PRN
COMMUNITY

## 2024-01-01 RX ORDER — BISACODYL 10 MG
10 SUPPOSITORY, RECTAL RECTAL DAILY PRN
Status: DISCONTINUED | OUTPATIENT
Start: 2024-01-01 | End: 2024-01-05 | Stop reason: HOSPADM

## 2024-01-01 RX ORDER — POLYETHYLENE GLYCOL 3350 17 G/17G
17 POWDER, FOR SOLUTION ORAL DAILY PRN
Status: DISCONTINUED | OUTPATIENT
Start: 2024-01-01 | End: 2024-01-05 | Stop reason: HOSPADM

## 2024-01-01 RX ORDER — ONDANSETRON 4 MG/1
4 TABLET, FILM COATED ORAL EVERY 6 HOURS PRN
Status: DISCONTINUED | OUTPATIENT
Start: 2024-01-01 | End: 2024-01-05 | Stop reason: HOSPADM

## 2024-01-01 RX ORDER — AMOXICILLIN 250 MG
2 CAPSULE ORAL 2 TIMES DAILY
Status: DISCONTINUED | OUTPATIENT
Start: 2024-01-01 | End: 2024-01-05 | Stop reason: HOSPADM

## 2024-01-01 RX ORDER — TRAMADOL HYDROCHLORIDE 50 MG/1
50 TABLET ORAL EVERY 6 HOURS PRN
COMMUNITY

## 2024-01-01 RX ORDER — AMLODIPINE BESYLATE 5 MG/1
10 TABLET ORAL DAILY
Status: DISCONTINUED | OUTPATIENT
Start: 2024-01-02 | End: 2024-01-05

## 2024-01-01 RX ORDER — SODIUM CHLORIDE 0.9 % (FLUSH) 0.9 %
10 SYRINGE (ML) INJECTION AS NEEDED
Status: DISCONTINUED | OUTPATIENT
Start: 2024-01-01 | End: 2024-01-05 | Stop reason: HOSPADM

## 2024-01-01 RX ORDER — METHYLPREDNISOLONE 4 MG/1
TABLET ORAL 2 TIMES DAILY
COMMUNITY
Start: 2023-12-29 | End: 2024-01-05 | Stop reason: HOSPADM

## 2024-01-01 RX ORDER — KETOROLAC TROMETHAMINE 30 MG/ML
15 INJECTION, SOLUTION INTRAMUSCULAR; INTRAVENOUS EVERY 6 HOURS PRN
Status: DISCONTINUED | OUTPATIENT
Start: 2024-01-01 | End: 2024-01-05 | Stop reason: HOSPADM

## 2024-01-01 RX ORDER — METHOCARBAMOL 500 MG/1
750 TABLET, FILM COATED ORAL 4 TIMES DAILY
Status: DISCONTINUED | OUTPATIENT
Start: 2024-01-01 | End: 2024-01-05 | Stop reason: HOSPADM

## 2024-01-01 RX ORDER — TRAMADOL HYDROCHLORIDE 50 MG/1
50 TABLET ORAL EVERY 6 HOURS PRN
Status: DISCONTINUED | OUTPATIENT
Start: 2024-01-01 | End: 2024-01-05 | Stop reason: HOSPADM

## 2024-01-01 RX ORDER — LISINOPRIL 20 MG/1
40 TABLET ORAL
Status: DISCONTINUED | OUTPATIENT
Start: 2024-01-02 | End: 2024-01-05 | Stop reason: HOSPADM

## 2024-01-01 RX ORDER — BISACODYL 5 MG/1
5 TABLET, DELAYED RELEASE ORAL DAILY PRN
Status: DISCONTINUED | OUTPATIENT
Start: 2024-01-01 | End: 2024-01-05 | Stop reason: HOSPADM

## 2024-01-01 RX ORDER — ASPIRIN 81 MG/1
162 TABLET ORAL DAILY
COMMUNITY

## 2024-01-01 RX ORDER — ACETAMINOPHEN 325 MG/1
650 TABLET ORAL EVERY 4 HOURS PRN
Status: DISCONTINUED | OUTPATIENT
Start: 2024-01-01 | End: 2024-01-05 | Stop reason: HOSPADM

## 2024-01-01 RX ORDER — SODIUM CHLORIDE 0.9 % (FLUSH) 0.9 %
10 SYRINGE (ML) INJECTION EVERY 12 HOURS SCHEDULED
Status: DISCONTINUED | OUTPATIENT
Start: 2024-01-01 | End: 2024-01-05 | Stop reason: HOSPADM

## 2024-01-01 RX ORDER — ASPIRIN 81 MG/1
162 TABLET, CHEWABLE ORAL DAILY
Status: DISCONTINUED | OUTPATIENT
Start: 2024-01-02 | End: 2024-01-02

## 2024-01-01 RX ORDER — SODIUM CHLORIDE 9 MG/ML
40 INJECTION, SOLUTION INTRAVENOUS AS NEEDED
Status: DISCONTINUED | OUTPATIENT
Start: 2024-01-01 | End: 2024-01-05 | Stop reason: HOSPADM

## 2024-01-01 RX ORDER — MELOXICAM 15 MG/1
15 TABLET ORAL DAILY
COMMUNITY

## 2024-01-01 RX ORDER — SULFAMETHOXAZOLE AND TRIMETHOPRIM 800; 160 MG/1; MG/1
1 TABLET ORAL 2 TIMES DAILY
COMMUNITY
Start: 2023-12-29 | End: 2024-01-05 | Stop reason: HOSPADM

## 2024-01-01 RX ORDER — DEXAMETHASONE SODIUM PHOSPHATE 4 MG/ML
2 INJECTION, SOLUTION INTRA-ARTICULAR; INTRALESIONAL; INTRAMUSCULAR; INTRAVENOUS; SOFT TISSUE EVERY 8 HOURS
Status: DISCONTINUED | OUTPATIENT
Start: 2024-01-01 | End: 2024-01-05 | Stop reason: HOSPADM

## 2024-01-01 RX ORDER — NITROGLYCERIN 0.4 MG/1
0.4 TABLET SUBLINGUAL
Status: DISCONTINUED | OUTPATIENT
Start: 2024-01-01 | End: 2024-01-05 | Stop reason: HOSPADM

## 2024-01-01 RX ADMIN — Medication 10 ML: at 20:20

## 2024-01-01 RX ADMIN — KETOROLAC TROMETHAMINE 15 MG: 30 INJECTION, SOLUTION INTRAMUSCULAR; INTRAVENOUS at 11:33

## 2024-01-01 RX ADMIN — METHOCARBAMOL 750 MG: 500 TABLET ORAL at 18:19

## 2024-01-01 RX ADMIN — CEFTRIAXONE 2000 MG: 2 INJECTION, POWDER, FOR SOLUTION INTRAMUSCULAR; INTRAVENOUS at 10:48

## 2024-01-01 RX ADMIN — DEXAMETHASONE SODIUM PHOSPHATE 2 MG: 4 INJECTION, SOLUTION INTRAMUSCULAR; INTRAVENOUS at 18:19

## 2024-01-01 NOTE — PLAN OF CARE
Problem: Adult Inpatient Plan of Care  Goal: Plan of Care Review  Outcome: Ongoing, Progressing  Flowsheets (Taken 1/1/2024 1214)  Progress: improving  Plan of Care Reviewed With: patient  Outcome Evaluation: Patient admitted from ED with complaint of right lower extremity pain/redness/swelling. Patient had history of recent surgery and fluid aspiration. Pain medications given as directed, care continues.  Goal: Patient-Specific Goal (Individualized)  Outcome: Ongoing, Progressing  Goal: Absence of Hospital-Acquired Illness or Injury  Outcome: Ongoing, Progressing  Goal: Optimal Comfort and Wellbeing  Outcome: Ongoing, Progressing  Goal: Readiness for Transition of Care  Outcome: Ongoing, Progressing  Intervention: Mutually Develop Transition Plan  Recent Flowsheet Documentation  Taken 1/1/2024 1120 by Petra Yip, RN  Transportation Anticipated: car, drives self  Patient/Family Anticipated Services at Transition: none  Patient/Family Anticipates Transition to: home  Taken 1/1/2024 1119 by Petra Yip, RN  Equipment Currently Used at Home:   walker, standard   crutches   Goal Outcome Evaluation:  Plan of Care Reviewed With: patient        Progress: improving  Outcome Evaluation: Patient admitted from ED with complaint of right lower extremity pain/redness/swelling. Patient had history of recent surgery and fluid aspiration. Pain medications given as directed, care continues.

## 2024-01-01 NOTE — H&P
[facility] H&P    Patient Name: Damien Avalos  : 1965  MRN: [id number]  Primary Care Physician: Brian Jones MD  Date of admission: 2024     Patient Care Team:  Brian Jones MD as PCP - General          Subjective   History Present Illness     Chief Complaint:   Chief Complaint   Patient presents with    Leg Pain     Left knee pain started Thursday night, was here on Thursday for the same.  Pt seen at Dr. Kwan office and had knee drained, told to come back to ER if pain persist.       Leg Pain     Mr. Avalos is a 58 y.o.  presents to [facility] complaining of leg pain      History of Present Illness    ED 24: 58 y.o. male presents with right knee pain. Onset of symptoms was Thursday evening and has been progressively worsening despite more conservative treatment measures including ice, rest and elevation.  Symptoms are associated with ability to move, exercise, and perform activities of daily living.  Symptoms are aggravated by weight bearing and ROM necessary for activities of daily living.   Symptoms improve with rest, ice and elevation only minimally. He had an aspiration in the office on Friday with cloudy aspirate noted. It was sent for culture. He was started on antibiotics and steriods on Friday as well. He came back to the hospital to get labs on Saturday , however, there was not a lab order. He presented back to the emergency room this am with continued complaints of pain and swelling.     Observation 24: Patient is a 58-year-old male presenting to the hospital with right knee pain.  Patient states symptoms of body aches chills and increased knee pain started Thursday.  Patient recently had aspiration office with orthopedic physician and noted cloudy aspiration culture sent.  Patient was started on antibiotics and steroids on Friday.  Patient states he is sent to hospital to get labs but order not present per staff.  Patient reported increased  swelling and redness with streaking to right lower extremity.  Denies shortness of breath, chest pain, nausea, vomiting falls or recent injury.    Review of Systems   Constitutional: Positive for malaise/fatigue.   HENT: Negative.     Eyes: Negative.    Cardiovascular: Negative.    Respiratory: Negative.     Endocrine: Negative.    Hematologic/Lymphatic: Negative.    Skin: Negative.  Positive for color change.   Musculoskeletal:  Positive for joint pain and joint swelling.   Gastrointestinal: Negative.    Genitourinary: Negative.    Neurological:  Positive for weakness.   Psychiatric/Behavioral: Negative.     Allergic/Immunologic: Negative.            Personal History     Past Medical History:   Past Medical History:   Diagnosis Date    Hypertension        Surgical History:      Past Surgical History:   Procedure Laterality Date    SHOULDER SURGERY      right shoulder-slap tear           Family History: family history includes Heart disease in his mother. Otherwise pertinent FHx was reviewed and unremarkable.     Social History:  reports that he has never smoked. He has never used smokeless tobacco. He reports that he does not drink alcohol and does not use drugs.      Medications:  Prior to Admission medications    Medication Sig Start Date End Date Taking? Authorizing Provider   acetaminophen (TYLENOL) 325 MG tablet Take 2 tablets by mouth Every 6 (Six) Hours As Needed for Mild Pain.   Yes Pili Gonzalez MD   amLODIPine (NORVASC) 10 MG tablet Take 1 tablet by mouth Daily. 6/23/22  Yes Brian Jones MD   aspirin 81 MG chewable tablet Chew 2 tablets Daily.   Yes Pili Gonzalez MD   benazepril (LOTENSIN) 40 MG tablet Take 1 tablet by mouth Daily. 6/23/22  Yes Brian Jones MD   meloxicam (MOBIC) 15 MG tablet Take 1 tablet by mouth Daily.   Yes Pili Gonzalez MD   methylPREDNISolone (MEDROL) 4 MG dose pack Take  by mouth 2 (Two) Times a Day. Take as directed on package  instructions. 12/29/23 1/3/24 Yes Pili Gonzalez MD   Multiple Vitamins-Minerals (MULTIVITAMIN ADULT) tablet Take 1 tablet by mouth Daily.   Yes Pili Gonzalez MD   sulfamethoxazole-trimethoprim (BACTRIM DS,SEPTRA DS) 800-160 MG per tablet Take 1 tablet by mouth 2 (Two) Times a Day. 12/29/23 1/4/24 Yes Pili Gonzalez MD   traMADol (ULTRAM) 50 MG tablet Take 1 tablet by mouth Every 6 (Six) Hours As Needed for Moderate Pain.    ProviderPili MD       Allergies:  No Known Allergies    Objective   Objective     Vital Signs  Temp:  [98.2 °F (36.8 °C)-98.3 °F (36.8 °C)] 98.3 °F (36.8 °C)  Heart Rate:  [72-82] 72  Resp:  [15-20] 15  BP: (126-150)/(77-85) 132/77  SpO2:  [94 %-98 %] 94 %  on   ;   Device (Oxygen Therapy): room air  Body mass index is 35.12 kg/m².    Physical Exam  Vitals and nursing note reviewed.   Constitutional:       Appearance: Normal appearance.   Cardiovascular:      Rate and Rhythm: Normal rate.   Musculoskeletal:         General: Swelling and tenderness present.      Cervical back: Normal range of motion.   Skin:     Findings: Erythema present.   Neurological:      Mental Status: He is alert.           Results Review:  I have personally reviewed most recent cardiac tracings, lab results, microbiology results, and radiology images and interpretations and agree with findings, most notably: CBC, CMP, blood cultures, x-ray.    Results from last 7 days   Lab Units 01/01/24  0745   WBC 10*3/mm3 16.30*   HEMOGLOBIN g/dL 14.5   HEMATOCRIT % 42.4   PLATELETS 10*3/mm3 259     Results from last 7 days   Lab Units 01/01/24  0745   SODIUM mmol/L 142   POTASSIUM mmol/L 4.4   CHLORIDE mmol/L 106   CO2 mmol/L 25.0   BUN mg/dL 23*   CREATININE mg/dL 1.16   GLUCOSE mg/dL 105*   CALCIUM mg/dL 9.6     Estimated Creatinine Clearance: 81.5 mL/min (by C-G formula based on SCr of 1.16 mg/dL).  Brief Urine Lab Results       None            Microbiology Results (last 10 days)       Procedure  Component Value - Date/Time    MRSA Screen, PCR (Inpatient) - Swab, Nares [[id number]]  (Normal) Collected: 01/01/24 0745    Lab Status: Final result Specimen: Swab from Nares Updated: 01/01/24 0907     MRSA PCR No MRSA Detected    Narrative:      The negative predictive value of this diagnostic test is high and should only be used to consider de-escalating anti-MRSA therapy. A positive result may indicate colonization with MRSA and must be correlated clinically.            ECG/EMG Results (most recent)       None            Results for orders placed during the hospital encounter of 12/29/23    Duplex venous lower extremity right CAR    Interpretation Summary    Normal right lower extremity venous duplex scan.          XR Knee 3 View Right    Result Date: 1/1/2024  Impression: 1. No acute osseous abnormality of the right knee. 2. Small suprapatellar joint effusion. Anterior soft tissue swelling. Electronically Signed: Juan Manuel Gorman  1/1/2024 8:33 AM EST  Workstation ID: [id number]       Estimated Creatinine Clearance: 81.5 mL/min (by C-G formula based on SCr of 1.16 mg/dL).    Assessment & Plan   Assessment/Plan       Active Hospital Problems    Diagnosis  POA    **Cellulitis [L03.90]  Yes      Resolved Hospital Problems   No resolved problems to display.     Cellulitis of right knee  -XR right knee: No acute osseous process seen, small suprapatellar joint effusion with anterior soft tissues swelling  -Blood cultures pending  -MRSA negative  -CRP 15.98, sed rate 27  -IV/oral analgesics and antibiotics as needed  -Continue IV Rocephin from ED  -WBC 16.3, Pro-Danyel 0.09, monitor trend  -Continue tramadol, IV Toradol, steroids  -IR and orthopedics consulted    Hypertension  BP Readings from Last 1 Encounters:   01/01/24 119/69   - Continue aspirin, amlodipine, lisinopril  - Monitor while admitted      VTE Prophylaxis -   Mechanical Order History:        Ordered        01/01/24 1104  Place Sequential Compression Device   Once            01/01/24 1104  Maintain Sequential Compression Device  Continuous                          Pharmalogical Order History:       None            CODE STATUS:    Code Status and Medical Interventions:   Ordered at: 01/01/24 1045     Level Of Support Discussed With:    Patient     Code Status (Patient has no pulse and is not breathing):    CPR (Attempt to Resuscitate)     Medical Interventions (Patient has pulse or is breathing):    Full Support       This patient has been examined wearing personal protective equipment.     I discussed the patient's findings and my recommendations with patient, family, nursing staff, primary care team, and consulting provider.      Signature:Electronically signed by JAZ Wills, 01/01/24, 4:26 PM EST.      I spent 35 minutes caring for Damien on this date of service. This time includes time spent by me in the following activities: reviewing tests, obtaining and/or reviewing a separately obtained history, performing a medically appropriate examination and/or evaluation, counseling and educating the patient/family/caregiver, ordering medications, tests, or procedures, referring and communicating with other health care professionals, documenting information in the medical record, independently interpreting results and communicating that information with the patient/family/caregiver, and care coordination.

## 2024-01-01 NOTE — ED PROVIDER NOTES
"Subjective   History of Present Illness  Chief complaint: Knee pain      Context: 58-year-old male who presents with his significant other complaining of right knee pain.  States he is status post recent meniscal repair and was seen in the office by his orthopedist midlevel a couple days ago and reports he had his knee drained \"and it looked like mustard so they started me on an antibiotic.\" Chills without fever. Had vascular scan 2 days ago neg for dvt.         PCP:   bela        Review of Systems   Constitutional:  Positive for chills and diaphoresis.       Past Medical History:   Diagnosis Date    Hypertension        No Known Allergies    Past Surgical History:   Procedure Laterality Date    KNEE ARTHROSCOPY Right 1/2/2024    Procedure: KNEE ARTHROSCOPY;  Surgeon: Casey Kwan MD;  Location: [facility];  Service: Orthopedics;  Laterality: Right;    KNEE INCISION AND DRAINAGE Right 1/2/2024    Procedure: KNEE INCISION AND DRAINAGE;  Surgeon: Casey Kwan MD;  Location: [facility];  Service: Orthopedics;  Laterality: Right;    SHOULDER SURGERY      right shoulder-slap tear       Family History   Problem Relation Age of Onset    Heart disease Mother        Social History     Socioeconomic History    Marital status:    Tobacco Use    Smoking status: Never    Smokeless tobacco: Never   Vaping Use    Vaping Use: Never used   Substance and Sexual Activity    Alcohol use: Never    Drug use: Never    Sexual activity: Defer           Objective   Physical Exam    Vital signs and triage nurse note reviewed.  Constitutional: Awake, alert; well-developed and well-nourished.   HEENT: Normocephalic   Neck: Supple, full range of motion without pain;    Cardiovascular: Regular rate and rhythm, normal S1-S2.  Pulmonary: Respiratory effort regular nonlabored, breath sounds clear to auscultation all fields.  Musculoskeletal: Independent range of motion of all extremities; no significant palpable " joint effusion to the right knee, there is erythema on the anterior medial lateral aspect extending down the mid tib/fib  Neuro: Alert oriented x3, speech is clear and appropriate, GCS 15  Skin:  Fleshtone warm, dry,        Procedures           ED Course      Labs Reviewed   WOUND CULTURE - Abnormal; Notable for the following components:       Result Value    Wound Culture Heavy growth (4+) Staphylococcus aureus (*)     All other components within normal limits   WOUND CULTURE - Abnormal; Notable for the following components:    Wound Culture Light growth (2+) Staphylococcus aureus (*)     All other components within normal limits    Narrative:     Refer to previous wound culture collected on01/02/2024 1400 for MICs      BASIC METABOLIC PANEL - Abnormal; Notable for the following components:    Glucose 105 (*)     BUN 23 (*)     All other components within normal limits    Narrative:     GFR Normal >60  Chronic Kidney Disease <60  Kidney Failure <15     SEDIMENTATION RATE - Abnormal; Notable for the following components:    Sed Rate 27 (*)     All other components within normal limits   C-REACTIVE PROTEIN - Abnormal; Notable for the following components:    C-Reactive Protein 15.98 (*)     All other components within normal limits   CBC WITH AUTO DIFFERENTIAL - Abnormal; Notable for the following components:    WBC 16.30 (*)     Neutrophil % 79.6 (*)     Lymphocyte % 11.7 (*)     Eosinophil % 0.0 (*)     Neutrophils, Absolute 12.90 (*)     Monocytes, Absolute 1.30 (*)     All other components within normal limits   BASIC METABOLIC PANEL - Abnormal; Notable for the following components:    Glucose 124 (*)     BUN 23 (*)     Sodium 134 (*)     BUN/Creatinine Ratio 26.1 (*)     All other components within normal limits    Narrative:     GFR Normal >60  Chronic Kidney Disease <60  Kidney Failure <15     CBC WITH AUTO DIFFERENTIAL - Abnormal; Notable for the following components:    WBC 18.00 (*)     Neutrophil % 86.7  (*)     Lymphocyte % 8.2 (*)     Monocyte % 4.7 (*)     Eosinophil % 0.0 (*)     Neutrophils, Absolute 15.60 (*)     All other components within normal limits   BASIC METABOLIC PANEL - Abnormal; Notable for the following components:    Glucose 178 (*)     BUN 31 (*)     BUN/Creatinine Ratio 30.7 (*)     All other components within normal limits    Narrative:     GFR Normal >60  Chronic Kidney Disease <60  Kidney Failure <15     CBC WITH AUTO DIFFERENTIAL - Abnormal; Notable for the following components:    WBC 20.40 (*)     Neutrophil % 85.8 (*)     Lymphocyte % 8.1 (*)     Eosinophil % 0.0 (*)     Neutrophils, Absolute 17.50 (*)     Monocytes, Absolute 1.20 (*)     All other components within normal limits   VANCOMYCIN, PEAK - Abnormal; Notable for the following components:    Vancomycin Peak 13.00 (*)     All other components within normal limits    Narrative:     Therapeutic Ranges for Vancomycin    Vancomycin Random   5.0-40.0 mcg/mL  Vancomycin Trough   5.0-20.0 mcg/mL  Vancomycin Peak     20.0-40.0 mcg/mL   BASIC METABOLIC PANEL - Abnormal; Notable for the following components:    Glucose 110 (*)     BUN 26 (*)     BUN/Creatinine Ratio 28.0 (*)     All other components within normal limits    Narrative:     GFR Normal >60  Chronic Kidney Disease <60  Kidney Failure <15     CBC WITH AUTO DIFFERENTIAL - Abnormal; Notable for the following components:    WBC 15.20 (*)     Neutrophil % 81.9 (*)     Lymphocyte % 11.2 (*)     Eosinophil % 0.2 (*)     Neutrophils, Absolute 12.40 (*)     Monocytes, Absolute 1.00 (*)     All other components within normal limits   BLOOD CULTURE - Normal   BLOOD CULTURE - Normal   MRSA SCREEN, PCR - Normal    Narrative:     The negative predictive value of this diagnostic test is high and should only be used to consider de-escalating anti-MRSA therapy. A positive result may indicate colonization with MRSA and must be correlated clinically.   ANAEROBIC CULTURE - Normal   ANAEROBIC  "CULTURE - Normal   PROCALCITONIN - Normal    Narrative:     As a Marker for Sepsis (Non-Neonates):    1. <0.5 ng/mL represents a low risk of severe sepsis and/or septic shock.  2. >2 ng/mL represents a high risk of severe sepsis and/or septic shock.    As a Marker for Lower Respiratory Tract Infections that require antibiotic therapy:    PCT on Admission    Antibiotic Therapy       6-12 Hrs later    >0.5                Strongly Recommended  >0.25 - <0.5        Recommended   0.1 - 0.25          Discouraged              Remeasure/reassess PCT  <0.1                Strongly Discouraged     Remeasure/reassess PCT    As 28 day mortality risk marker: \"Change in Procalcitonin Result\" (>80% or <=80%) if Day 0 (or Day 1) and Day 4 values are available. Refer to [email or web address]cal[facility].com    Change in PCT <=80%  A decrease of PCT levels below or equal to 80% defines a positive change in PCT test result representing a higher risk for 28-day all-cause mortality of patients diagnosed with severe sepsis for septic shock.    Change in PCT >80%  A decrease of PCT levels of more than 80% defines a negative change in PCT result representing a lower risk for 28-day all-cause mortality of patients diagnosed with severe sepsis or septic shock.      VANCOMYCIN, TROUGH - Normal    Narrative:     Therapeutic Ranges for Vancomycin    Vancomycin Random   5.0-40.0 mcg/mL  Vancomycin Trough   5.0-20.0 mcg/mL  Vancomycin Peak     20.0-40.0 mcg/mL   VANCOMYCIN, TROUGH - Normal    Narrative:     Therapeutic Ranges for Vancomycin    Vancomycin Random   5.0-40.0 mcg/mL  Vancomycin Trough   5.0-20.0 mcg/mL  Vancomycin Peak     20.0-40.0 mcg/mL   POTASSIUM - Normal   MAGNESIUM - Normal   CBC AND DIFFERENTIAL    Narrative:     The following orders were created for panel order CBC & Differential.  Procedure                               Abnormality         Status                     ---------                               -----------         " ------                     CBC Auto Differential[[id number]]        Abnormal            Final result                 Please view results for these tests on the individual orders.   CBC AND DIFFERENTIAL    Narrative:     The following orders were created for panel order CBC & Differential.  Procedure                               Abnormality         Status                     ---------                               -----------         ------                     CBC Auto Differential[[id number]]        Abnormal            Final result                 Please view results for these tests on the individual orders.   CBC AND DIFFERENTIAL    Narrative:     The following orders were created for panel order CBC & Differential.  Procedure                               Abnormality         Status                     ---------                               -----------         ------                     CBC Auto Differential[[id number]]        Abnormal            Final result                 Please view results for these tests on the individual orders.   CBC AND DIFFERENTIAL    Narrative:     The following orders were created for panel order CBC & Differential.  Procedure                               Abnormality         Status                     ---------                               -----------         ------                     CBC Auto Differential[[id number]]        Abnormal            Final result                 Please view results for these tests on the individual orders.     Medications   sodium chloride 0.9 % flush 10 mL ( Intravenous MAR Unhold 1/2/24 1604)   amLODIPine (NORVASC) tablet 10 mg (10 mg Oral Given 1/4/24 0900)   lisinopril (PRINIVIL,ZESTRIL) tablet 40 mg (40 mg Oral Given 1/4/24 0900)   traMADol (ULTRAM) tablet 50 mg ( Oral MAR Unhold 1/2/24 1604)   sodium chloride 0.9 % flush 10 mL (10 mL Intravenous Not Given 1/4/24 2019)   sodium chloride 0.9 % flush 10 mL ( Intravenous MAR Unhold 1/2/24 1604)   sodium  chloride 0.9 % infusion 40 mL ( Intravenous MAR Unhold 1/2/24 1604)   sennosides-docusate (PERICOLACE) 8.6-50 MG per tablet 2 tablet (2 tablets Oral Not Given 1/4/24 2018)     And   polyethylene glycol (MIRALAX) packet 17 g ( Oral MAR Unhold 1/2/24 1604)     And   bisacodyl (DULCOLAX) EC tablet 5 mg ( Oral MAR Unhold 1/2/24 1604)     And   bisacodyl (DULCOLAX) suppository 10 mg ( Rectal MAR Unhold 1/2/24 1604)   nitroglycerin (NITROSTAT) SL tablet 0.4 mg ( Sublingual MAR Unhold 1/2/24 1604)   acetaminophen (TYLENOL) tablet 650 mg (650 mg Oral Given 1/4/24 1538)   ondansetron (ZOFRAN) tablet 4 mg (4 mg Oral Given 1/3/24 0408)   ketorolac (TORADOL) injection 15 mg (15 mg Intravenous Given 1/4/24 0148)   methocarbamol (ROBAXIN) tablet 750 mg (750 mg Oral Given 1/4/24 2027)   dexAMETHasone (DECADRON) injection 2 mg (2 mg Intravenous Given 1/5/24 0239)   sodium chloride 0.9 % infusion 1,000 mL (0 mL Intravenous Stopped 1/3/24 1417)   HYDROcodone-acetaminophen (NORCO) 7.5-325 MG per tablet 1 tablet (1 tablet Oral Given 1/5/24 0239)   cefTRIAXone (ROCEPHIN) 2,000 mg in sodium chloride 0.9 % 100 mL IVPB (2,000 mg Intravenous New Bag 1/4/24 0858)   sodium chloride 0.9 % flush 10 mL (10 mL Intravenous Not Given 1/4/24 2019)   sodium chloride 0.9 % flush 10 mL (10 mL Intravenous Given 1/4/24 0151)   sodium chloride 0.9 % infusion 40 mL (has no administration in time range)   sennosides-docusate (PERICOLACE) 8.6-50 MG per tablet 2 tablet (2 tablets Oral Not Given 1/4/24 2019)     And   polyethylene glycol (MIRALAX) packet 17 g (has no administration in time range)     And   bisacodyl (DULCOLAX) EC tablet 5 mg (has no administration in time range)     And   bisacodyl (DULCOLAX) suppository 10 mg (has no administration in time range)   Enoxaparin Sodium (LOVENOX) syringe 40 mg (40 mg Subcutaneous Given 1/4/24 1531)   aspirin EC tablet 81 mg (81 mg Oral Given 1/4/24 2027)   cefTRIAXone (ROCEPHIN) 2,000 mg in sodium chloride 0.9 %  "100 mL IVPB (0 mg Intravenous Stopped 1/1/24 1125)   vancomycin IVPB 1750 mg in 0.9% Sodium Chloride (premix) 500 mL (0 mg Intravenous Stopped 1/2/24 2038)     No radiology results for the last day  Prior to Admission medications    Medication Sig Start Date End Date Taking? Authorizing Provider   acetaminophen (TYLENOL) 325 MG tablet Take 2 tablets by mouth Every 6 (Six) Hours As Needed for Mild Pain.   Yes Pili Gonzalez MD   amLODIPine (NORVASC) 10 MG tablet Take 1 tablet by mouth Daily. 6/23/22  Yes Brian Jones MD   aspirin 81 MG EC tablet Take 2 tablets by mouth Daily.   Yes Pili Gonzalez MD   benazepril (LOTENSIN) 40 MG tablet Take 1 tablet by mouth Daily. 6/23/22  Yes Brian Jones MD   meloxicam (MOBIC) 15 MG tablet Take 1 tablet by mouth Daily.   Yes Pili Gonzalez MD   Multiple Vitamins-Minerals (MULTIVITAMIN ADULT) tablet Take 1 tablet by mouth Daily.   Yes Pili Gonzalez MD   sulfamethoxazole-trimethoprim (BACTRIM DS,SEPTRA DS) 800-160 MG per tablet Take 1 tablet by mouth 2 (Two) Times a Day. 12/29/23 1/4/24 Yes Pili Gonzalez MD   traMADol (ULTRAM) 50 MG tablet Take 1 tablet by mouth Every 6 (Six) Hours As Needed for Moderate Pain.    Pili Gonzalez MD                                            Medical Decision Making      /83 (BP Location: Left arm, Patient Position: Lying)   Pulse 64   Temp 97.3 °F (36.3 °C) (Temporal)   Resp 14   Ht 172.7 cm (68\")   Wt 105 kg (231 lb)   SpO2 97%   BMI 35.12 kg/m²         Radiology interpretation:  X-rays reviewed independently and interpreted by me: Mild suprapatellar joint effusion  Further interpretation by radiologist as above  Lab interpretation:  Labs all viewed by me and significant for, crp 15, esr 27 wbc 16.3         Appropriate PPE worn during exam.  Patient had an IV established labs x-ray obtained to evaluate for infection effusion.  He has been on Bactrim for 2 days.  I " discussed with Khoa Mcgovern for Dr. Kwan. Iv abx were started, discussed with [place] pharmacist.       i discussed findings with patient who voices understanding of  obs placement    Problems Addressed:  Cellulitis of right lower extremity: complicated acute illness or injury    Amount and/or Complexity of Data Reviewed  Labs: ordered.  Radiology: ordered.  ECG/medicine tests: ordered.    Risk  Prescription drug management.  Decision regarding hospitalization.        Final diagnoses:   Cellulitis of right lower extremity       ED Disposition  ED Disposition       ED Disposition   Decision to Admit    Condition   --    Comment   --               No follow-up provider specified.       Medication List        ASK your doctor about these medications      methylPREDNISolone 4 MG dose pack  Commonly known as: MEDROL  Ask about: Should I take this medication?     sulfamethoxazole-trimethoprim 800-160 MG per tablet  Commonly known as: BACTRIM DS,SEPTRA DS  Ask about: Should I take this medication?                 Tammie Watson, APRN  01/05/24 0728

## 2024-01-01 NOTE — H&P
"   History & Physical       Patient: Damien Avalos    Proposed Surgery and date:      YOB: 1965    Medical Record Number: [id number]  Wt Readings from Last 3 Encounters:   01/01/24 105 kg (231 lb)   06/23/22 97.3 kg (214 lb 6.4 oz)   06/13/22 98.2 kg (216 lb 9.6 oz)     Ht Readings from Last 3 Encounters:   01/01/24 172.7 cm (68\")   06/23/22 172.7 cm (68\")   06/13/22 172.7 cm (68\")     Body mass index is 35.12 kg/m².  Facility age limit for growth %isac is 20 years.      Surgeon:  Dr. Casey Kwan M.D.        Chief Complaints: Pain of right knee    History of Present Illness: 58 y.o. male presents with right knee pain. Onset of symptoms was Thursday evening and has been progressively worsening despite more conservative treatment measures including ice, rest and elevation.  Symptoms are associated with ability to move, exercise, and perform activities of daily living.  Symptoms are aggravated by weight bearing and ROM necessary for activities of daily living.   Symptoms improve with rest, ice and elevation only minimally. He had an aspiration in the office on Friday with cloudy aspirate noted. It was sent for culture. He was started on antibiotics and steriods on Friday as well. He came back to the hospital to get labs on Saturday , however, there was not a lab order. He presented back to the emergency room this am with continued complaints of pain and swelling.    Allergies: No Known Allergies    Medications:   Home Medications:  No current facility-administered medications on file prior to encounter.     Current Outpatient Medications on File Prior to Encounter   Medication Sig    acetaminophen (TYLENOL) 325 MG tablet Take 2 tablets by mouth Every 6 (Six) Hours As Needed for Mild Pain.    amLODIPine (NORVASC) 10 MG tablet Take 1 tablet by mouth Daily.    aspirin 81 MG chewable tablet Chew 2 tablets Daily.    benazepril (LOTENSIN) 40 MG tablet Take 1 tablet by mouth Daily.    meloxicam (MOBIC) 15 MG " tablet Take 1 tablet by mouth Daily.    methylPREDNISolone (MEDROL) 4 MG dose pack Take  by mouth 2 (Two) Times a Day. Take as directed on package instructions.    Multiple Vitamins-Minerals (MULTIVITAMIN ADULT) tablet Take 1 tablet by mouth Daily.    sulfamethoxazole-trimethoprim (BACTRIM DS,SEPTRA DS) 800-160 MG per tablet Take 1 tablet by mouth 2 (Two) Times a Day.    traMADol (ULTRAM) 50 MG tablet Take 1 tablet by mouth Every 6 (Six) Hours As Needed for Moderate Pain.     Current Medications:  Scheduled Meds:[START ON 1/2/2024] amLODIPine, 10 mg, Oral, Daily  [START ON 1/2/2024] aspirin, 162 mg, Oral, Daily  [START ON 1/2/2024] cefTRIAXone, 2,000 mg, Intravenous, Daily  [START ON 1/2/2024] lisinopril, 40 mg, Oral, Q24H  senna-docusate sodium, 2 tablet, Oral, BID  sodium chloride, 10 mL, Intravenous, Q12H      Continuous Infusions:   PRN Meds:.  acetaminophen    senna-docusate sodium **AND** polyethylene glycol **AND** bisacodyl **AND** bisacodyl    ketorolac    nitroglycerin    ondansetron **OR** ondansetron    [COMPLETED] Insert Peripheral IV **AND** sodium chloride    sodium chloride    sodium chloride    traMADol    Past Medical History:   Diagnosis Date    Hypertension         Past Surgical History:   Procedure Laterality Date    SHOULDER SURGERY      right shoulder-slap tear        Social History     Occupational History    Not on file   Tobacco Use    Smoking status: Never    Smokeless tobacco: Never   Vaping Use    Vaping Use: Never used   Substance and Sexual Activity    Alcohol use: Never    Drug use: Never    Sexual activity: Defer      Social History     Social History Narrative    Not on file        Family History   Problem Relation Age of Onset    Heart disease Mother          Review of Systems: 14 point review of systems was performed and was negative except as documented in the history of present illness.      Physical Exam: 58 y.o. male    Vital signs reviewed.    General Appearance:    Alert,  "cooperative, in no acute distress                  General: alert, oriented  Eyes: conjunctiva clear  ENT: external ears and nose atraumatic  CV: no peripheral edema  Resp: normal respiratory effort  Skin: no rashes or wounds; normal turgor  Psych: mood and affect appropriate  Lymph: no nodes appreciated  Neuro: gross sensation intact  Vascular:  Palpable peripheral pulse in noted extremity  Musculoskeletal Extremities:  tenderness over operative extremity. Moves all extremities well, no to minimal LE edema, no cyanosis, no redness            Diagnostic Tests:  Lab Results (last 7 days)       Procedure Component Value Units Date/Time    Procalcitonin [[id number]]  (Normal) Collected: 01/01/24 0745    Specimen: Blood Updated: 01/01/24 1303     Procalcitonin 0.09 ng/mL     Narrative:      As a Marker for Sepsis (Non-Neonates):    1. <0.5 ng/mL represents a low risk of severe sepsis and/or septic shock.  2. >2 ng/mL represents a high risk of severe sepsis and/or septic shock.    As a Marker for Lower Respiratory Tract Infections that require antibiotic therapy:    PCT on Admission    Antibiotic Therapy       6-12 Hrs later    >0.5                Strongly Recommended  >0.25 - <0.5        Recommended   0.1 - 0.25          Discouraged              Remeasure/reassess PCT  <0.1                Strongly Discouraged     Remeasure/reassess PCT    As 28 day mortality risk marker: \"Change in Procalcitonin Result\" (>80% or <=80%) if Day 0 (or Day 1) and Day 4 values are available. Refer to http://www.[employer]-pct-calculator.com    Change in PCT <=80%  A decrease of PCT levels below or equal to 80% defines a positive change in PCT test result representing a higher risk for 28-day all-cause mortality of patients diagnosed with severe sepsis for septic shock.    Change in PCT >80%  A decrease of PCT levels of more than 80% defines a negative change in PCT result representing a lower risk for 28-day all-cause mortality of patients " diagnosed with severe sepsis or septic shock.       MRSA Screen, PCR (Inpatient) - Swab, Nares [[id number]]  (Normal) Collected: 01/01/24 0745    Specimen: Swab from Nares Updated: 01/01/24 0907     MRSA PCR No MRSA Detected    Narrative:      The negative predictive value of this diagnostic test is high and should only be used to consider de-escalating anti-MRSA therapy. A positive result may indicate colonization with MRSA and must be correlated clinically.    Sedimentation Rate [[id number]]  (Abnormal) Collected: 01/01/24 0745    Specimen: Blood Updated: 01/01/24 0819     Sed Rate 27 mm/hr     Basic Metabolic Panel [[id number]]  (Abnormal) Collected: 01/01/24 0745    Specimen: Blood Updated: 01/01/24 0810     Glucose 105 mg/dL      BUN 23 mg/dL      Creatinine 1.16 mg/dL      Sodium 142 mmol/L      Potassium 4.4 mmol/L      Chloride 106 mmol/L      CO2 25.0 mmol/L      Calcium 9.6 mg/dL      BUN/Creatinine Ratio 19.8     Anion Gap 11.0 mmol/L      eGFR 73.0 mL/min/1.73     Narrative:      GFR Normal >60  Chronic Kidney Disease <60  Kidney Failure <15      C-reactive Protein [[id number]]  (Abnormal) Collected: 01/01/24 0745    Specimen: Blood Updated: 01/01/24 0810     C-Reactive Protein 15.98 mg/dL     Blood Culture - Blood, Arm, Right [[id number]] Collected: 01/01/24 0757    Specimen: Blood from Arm, Right Updated: 01/01/24 0801    CBC & Differential [[id number]]  (Abnormal) Collected: 01/01/24 0745    Specimen: Blood Updated: 01/01/24 0751    Narrative:      The following orders were created for panel order CBC & Differential.  Procedure                               Abnormality         Status                     ---------                               -----------         ------                     CBC Auto Differential[[id number]]        Abnormal            Final result                 Please view results for these tests on the individual orders.    CBC Auto Differential [[id number]]  (Abnormal) Collected: 01/01/24  0745    Specimen: Blood Updated: 01/01/24 0751     WBC 16.30 10*3/mm3      RBC 4.67 10*6/mm3      Hemoglobin 14.5 g/dL      Hematocrit 42.4 %      MCV 90.7 fL      MCH 31.0 pg      MCHC 34.2 g/dL      RDW 13.7 %      RDW-SD 43.3 fl      MPV 7.8 fL      Platelets 259 10*3/mm3      Neutrophil % 79.6 %      Lymphocyte % 11.7 %      Monocyte % 8.2 %      Eosinophil % 0.0 %      Basophil % 0.5 %      Neutrophils, Absolute 12.90 10*3/mm3      Lymphocytes, Absolute 1.90 10*3/mm3      Monocytes, Absolute 1.30 10*3/mm3      Eosinophils, Absolute 0.00 10*3/mm3      Basophils, Absolute 0.10 10*3/mm3      nRBC 0.0 /100 WBC     Blood Culture - Blood, Arm, Left [[id number]] Collected: 01/01/24 0745    Specimen: Blood from Arm, Left Updated: 01/01/24 0748            Radiology images/reports reviewed      Assessment: Right knee infection    Plan:  We will plan on sending the patient to interventional radiology to tomorrow morning for aspiration with cultures to be sent. He will continue on antibiotics.The patient will be taken to back to the operating room tomorrow for an I & D procedure. We reviewed details of procedure with patient today and discussed risks, benefits, alternatives, and limitations of the procedure in laymen's terms with the risks including but not limited to:  neurovascular damage, bleeding, infection, chronic pain, worsening of pain, chondrolysis, recurrent problem,  swelling, loss of motion, weakness, stiffness, instability, DVT, pulmonary embolus, death, stroke, complex regional pain syndrome, and need for additional procedures.  No guarantees were given regarding results of surgery.     Patient was given the opportunity to ask and have all questions answered today.  The patient voiced understanding of the risks, benefits, and alternative forms of treatment that were discussed and the patient consents to proceed with surgery.     Discharge Plan: Home with f/u in with Dr. Kwan  Date: 1/1/2024  Edwina Alan  Az[place]a, APRN

## 2024-01-02 ENCOUNTER — ANESTHESIA (OUTPATIENT)
Dept: PERIOP | Facility: HOSPITAL | Age: 59
End: 2024-01-02
Payer: COMMERCIAL

## 2024-01-02 ENCOUNTER — ANESTHESIA EVENT (OUTPATIENT)
Dept: PERIOP | Facility: HOSPITAL | Age: 59
End: 2024-01-02
Payer: COMMERCIAL

## 2024-01-02 LAB
ANION GAP SERPL CALCULATED.3IONS-SCNC: 7 MMOL/L (ref 5–15)
BASOPHILS # BLD AUTO: 0.1 10*3/MM3 (ref 0–0.2)
BASOPHILS NFR BLD AUTO: 0.4 % (ref 0–1.5)
BUN SERPL-MCNC: 23 MG/DL (ref 6–20)
BUN/CREAT SERPL: 26.1 (ref 7–25)
CALCIUM SPEC-SCNC: 9.1 MG/DL (ref 8.6–10.5)
CHLORIDE SERPL-SCNC: 103 MMOL/L (ref 98–107)
CO2 SERPL-SCNC: 24 MMOL/L (ref 22–29)
CREAT SERPL-MCNC: 0.88 MG/DL (ref 0.76–1.27)
DEPRECATED RDW RBC AUTO: 42 FL (ref 37–54)
EGFRCR SERPLBLD CKD-EPI 2021: 99.7 ML/MIN/1.73
EOSINOPHIL # BLD AUTO: 0 10*3/MM3 (ref 0–0.4)
EOSINOPHIL NFR BLD AUTO: 0 % (ref 0.3–6.2)
ERYTHROCYTE [DISTWIDTH] IN BLOOD BY AUTOMATED COUNT: 13.3 % (ref 12.3–15.4)
GLUCOSE SERPL-MCNC: 124 MG/DL (ref 65–99)
HCT VFR BLD AUTO: 42.6 % (ref 37.5–51)
HGB BLD-MCNC: 14.5 G/DL (ref 13–17.7)
LYMPHOCYTES # BLD AUTO: 1.5 10*3/MM3 (ref 0.7–3.1)
LYMPHOCYTES NFR BLD AUTO: 8.2 % (ref 19.6–45.3)
MCH RBC QN AUTO: 31.2 PG (ref 26.6–33)
MCHC RBC AUTO-ENTMCNC: 34 G/DL (ref 31.5–35.7)
MCV RBC AUTO: 91.7 FL (ref 79–97)
MONOCYTES # BLD AUTO: 0.9 10*3/MM3 (ref 0.1–0.9)
MONOCYTES NFR BLD AUTO: 4.7 % (ref 5–12)
NEUTROPHILS NFR BLD AUTO: 15.6 10*3/MM3 (ref 1.7–7)
NEUTROPHILS NFR BLD AUTO: 86.7 % (ref 42.7–76)
NRBC BLD AUTO-RTO: 0 /100 WBC (ref 0–0.2)
PLATELET # BLD AUTO: 306 10*3/MM3 (ref 140–450)
PMV BLD AUTO: 8.1 FL (ref 6–12)
POTASSIUM SERPL-SCNC: 4.5 MMOL/L (ref 3.5–5.2)
RBC # BLD AUTO: 4.65 10*6/MM3 (ref 4.14–5.8)
SODIUM SERPL-SCNC: 134 MMOL/L (ref 136–145)
WBC NRBC COR # BLD AUTO: 18 10*3/MM3 (ref 3.4–10.8)

## 2024-01-02 PROCEDURE — 80048 BASIC METABOLIC PNL TOTAL CA: CPT | Performed by: NURSE PRACTITIONER

## 2024-01-02 PROCEDURE — 25810000003 LACTATED RINGERS PER 1000 ML: Performed by: NURSE ANESTHETIST, CERTIFIED REGISTERED

## 2024-01-02 PROCEDURE — 25010000002 CEFAZOLIN PER 500 MG: Performed by: NURSE ANESTHETIST, CERTIFIED REGISTERED

## 2024-01-02 PROCEDURE — 0SBC4ZZ EXCISION OF RIGHT KNEE JOINT, PERCUTANEOUS ENDOSCOPIC APPROACH: ICD-10-PCS | Performed by: ORTHOPAEDIC SURGERY

## 2024-01-02 PROCEDURE — G0378 HOSPITAL OBSERVATION PER HR: HCPCS

## 2024-01-02 PROCEDURE — 25010000002 MIDAZOLAM PER 1 MG: Performed by: NURSE ANESTHETIST, CERTIFIED REGISTERED

## 2024-01-02 PROCEDURE — 25010000002 ONDANSETRON PER 1 MG: Performed by: NURSE ANESTHETIST, CERTIFIED REGISTERED

## 2024-01-02 PROCEDURE — 25010000002 VANCOMYCIN 1 G RECONSTITUTED SOLUTION: Performed by: ORTHOPAEDIC SURGERY

## 2024-01-02 PROCEDURE — 87070 CULTURE OTHR SPECIMN AEROBIC: CPT | Performed by: ORTHOPAEDIC SURGERY

## 2024-01-02 PROCEDURE — 87186 SC STD MICRODIL/AGAR DIL: CPT | Performed by: ORTHOPAEDIC SURGERY

## 2024-01-02 PROCEDURE — 25010000002 EPINEPHRINE PER 0.1 MG: Performed by: ORTHOPAEDIC SURGERY

## 2024-01-02 PROCEDURE — 25010000002 FENTANYL CITRATE (PF) 100 MCG/2ML SOLUTION: Performed by: NURSE ANESTHETIST, CERTIFIED REGISTERED

## 2024-01-02 PROCEDURE — 87147 CULTURE TYPE IMMUNOLOGIC: CPT | Performed by: ORTHOPAEDIC SURGERY

## 2024-01-02 PROCEDURE — 87075 CULTR BACTERIA EXCEPT BLOOD: CPT | Performed by: ORTHOPAEDIC SURGERY

## 2024-01-02 PROCEDURE — 25010000002 DEXAMETHASONE PER 1 MG: Performed by: NURSE PRACTITIONER

## 2024-01-02 PROCEDURE — 25010000002 KETOROLAC TROMETHAMINE PER 15 MG: Performed by: NURSE PRACTITIONER

## 2024-01-02 PROCEDURE — 25010000002 KETOROLAC TROMETHAMINE PER 15 MG: Performed by: ORTHOPAEDIC SURGERY

## 2024-01-02 PROCEDURE — 25010000002 KETOROLAC TROMETHAMINE PER 15 MG: Performed by: NURSE ANESTHETIST, CERTIFIED REGISTERED

## 2024-01-02 PROCEDURE — 25010000002 ROPIVACAINE PER 1 MG: Performed by: ORTHOPAEDIC SURGERY

## 2024-01-02 PROCEDURE — 0S9C3ZX DRAINAGE OF RIGHT KNEE JOINT, PERCUTANEOUS APPROACH, DIAGNOSTIC: ICD-10-PCS | Performed by: ORTHOPAEDIC SURGERY

## 2024-01-02 PROCEDURE — 25010000002 VANCOMYCIN HCL IN NACL 1.75-0.9 GM/500ML-% SOLUTION: Performed by: NURSE PRACTITIONER

## 2024-01-02 PROCEDURE — 85025 COMPLETE CBC W/AUTO DIFF WBC: CPT | Performed by: NURSE PRACTITIONER

## 2024-01-02 PROCEDURE — 87205 SMEAR GRAM STAIN: CPT | Performed by: ORTHOPAEDIC SURGERY

## 2024-01-02 PROCEDURE — 25010000002 CEFTRIAXONE PER 250 MG: Performed by: NURSE PRACTITIONER

## 2024-01-02 PROCEDURE — 25010000002 PROPOFOL 200 MG/20ML EMULSION: Performed by: NURSE ANESTHETIST, CERTIFIED REGISTERED

## 2024-01-02 PROCEDURE — 25010000002 HYDROMORPHONE 1 MG/ML SOLUTION: Performed by: NURSE ANESTHETIST, CERTIFIED REGISTERED

## 2024-01-02 PROCEDURE — 25010000002 DEXAMETHASONE PER 1 MG: Performed by: NURSE ANESTHETIST, CERTIFIED REGISTERED

## 2024-01-02 RX ORDER — SODIUM CHLORIDE 9 MG/ML
1000 INJECTION, SOLUTION INTRAVENOUS CONTINUOUS
Status: DISPENSED | OUTPATIENT
Start: 2024-01-02 | End: 2024-01-04

## 2024-01-02 RX ORDER — DEXAMETHASONE SODIUM PHOSPHATE 4 MG/ML
INJECTION, SOLUTION INTRA-ARTICULAR; INTRALESIONAL; INTRAMUSCULAR; INTRAVENOUS; SOFT TISSUE AS NEEDED
Status: DISCONTINUED | OUTPATIENT
Start: 2024-01-02 | End: 2024-01-02 | Stop reason: SURG

## 2024-01-02 RX ORDER — ONDANSETRON 2 MG/ML
4 INJECTION INTRAMUSCULAR; INTRAVENOUS ONCE AS NEEDED
Status: DISCONTINUED | OUTPATIENT
Start: 2024-01-02 | End: 2024-01-02 | Stop reason: HOSPADM

## 2024-01-02 RX ORDER — LIDOCAINE HYDROCHLORIDE 10 MG/ML
0.5 INJECTION, SOLUTION INFILTRATION; PERINEURAL ONCE AS NEEDED
Status: DISCONTINUED | OUTPATIENT
Start: 2024-01-02 | End: 2024-01-02 | Stop reason: HOSPADM

## 2024-01-02 RX ORDER — HYDROCODONE BITARTRATE AND ACETAMINOPHEN 7.5; 325 MG/1; MG/1
1 TABLET ORAL EVERY 6 HOURS PRN
Status: DISCONTINUED | OUTPATIENT
Start: 2024-01-02 | End: 2024-01-05 | Stop reason: HOSPADM

## 2024-01-02 RX ORDER — OXYCODONE HYDROCHLORIDE 5 MG/1
5 TABLET ORAL ONCE AS NEEDED
Status: DISCONTINUED | OUTPATIENT
Start: 2024-01-02 | End: 2024-01-02 | Stop reason: HOSPADM

## 2024-01-02 RX ORDER — CEFAZOLIN SODIUM 1 G/3ML
INJECTION, POWDER, FOR SOLUTION INTRAMUSCULAR; INTRAVENOUS AS NEEDED
Status: DISCONTINUED | OUTPATIENT
Start: 2024-01-02 | End: 2024-01-02 | Stop reason: SURG

## 2024-01-02 RX ORDER — DIPHENHYDRAMINE HYDROCHLORIDE 50 MG/ML
12.5 INJECTION INTRAMUSCULAR; INTRAVENOUS ONCE AS NEEDED
Status: DISCONTINUED | OUTPATIENT
Start: 2024-01-02 | End: 2024-01-02 | Stop reason: HOSPADM

## 2024-01-02 RX ORDER — ONDANSETRON 2 MG/ML
INJECTION INTRAMUSCULAR; INTRAVENOUS AS NEEDED
Status: DISCONTINUED | OUTPATIENT
Start: 2024-01-02 | End: 2024-01-02 | Stop reason: SURG

## 2024-01-02 RX ORDER — OXYCODONE HYDROCHLORIDE 5 MG/1
10 TABLET ORAL EVERY 4 HOURS PRN
Status: DISCONTINUED | OUTPATIENT
Start: 2024-01-02 | End: 2024-01-02 | Stop reason: HOSPADM

## 2024-01-02 RX ORDER — PROPOFOL 10 MG/ML
INJECTION, EMULSION INTRAVENOUS AS NEEDED
Status: DISCONTINUED | OUTPATIENT
Start: 2024-01-02 | End: 2024-01-02 | Stop reason: SURG

## 2024-01-02 RX ORDER — MEPERIDINE HYDROCHLORIDE 25 MG/ML
12.5 INJECTION INTRAMUSCULAR; INTRAVENOUS; SUBCUTANEOUS
Status: DISCONTINUED | OUTPATIENT
Start: 2024-01-02 | End: 2024-01-02 | Stop reason: HOSPADM

## 2024-01-02 RX ORDER — NALOXONE HCL 0.4 MG/ML
0.4 VIAL (ML) INJECTION AS NEEDED
Status: DISCONTINUED | OUTPATIENT
Start: 2024-01-02 | End: 2024-01-02 | Stop reason: HOSPADM

## 2024-01-02 RX ORDER — SODIUM CHLORIDE 0.9 % (FLUSH) 0.9 %
10 SYRINGE (ML) INJECTION AS NEEDED
Status: DISCONTINUED | OUTPATIENT
Start: 2024-01-02 | End: 2024-01-02 | Stop reason: HOSPADM

## 2024-01-02 RX ORDER — ASPIRIN 81 MG/1
81 TABLET, CHEWABLE ORAL 2 TIMES DAILY
Qty: 60 TABLET | Refills: 0 | Status: DISCONTINUED | OUTPATIENT
Start: 2024-01-03 | End: 2024-01-04

## 2024-01-02 RX ORDER — DIPHENHYDRAMINE HYDROCHLORIDE 50 MG/ML
12.5 INJECTION INTRAMUSCULAR; INTRAVENOUS
Status: DISCONTINUED | OUTPATIENT
Start: 2024-01-02 | End: 2024-01-02 | Stop reason: HOSPADM

## 2024-01-02 RX ORDER — VANCOMYCIN HYDROCHLORIDE 1 G/20ML
INJECTION, POWDER, LYOPHILIZED, FOR SOLUTION INTRAVENOUS AS NEEDED
Status: DISCONTINUED | OUTPATIENT
Start: 2024-01-02 | End: 2024-01-02 | Stop reason: HOSPADM

## 2024-01-02 RX ORDER — FENTANYL CITRATE 50 UG/ML
INJECTION, SOLUTION INTRAMUSCULAR; INTRAVENOUS AS NEEDED
Status: DISCONTINUED | OUTPATIENT
Start: 2024-01-02 | End: 2024-01-02 | Stop reason: SURG

## 2024-01-02 RX ORDER — MIDAZOLAM HYDROCHLORIDE 1 MG/ML
INJECTION INTRAMUSCULAR; INTRAVENOUS AS NEEDED
Status: DISCONTINUED | OUTPATIENT
Start: 2024-01-02 | End: 2024-01-02 | Stop reason: SURG

## 2024-01-02 RX ORDER — HYDRALAZINE HYDROCHLORIDE 20 MG/ML
5 INJECTION INTRAMUSCULAR; INTRAVENOUS
Status: DISCONTINUED | OUTPATIENT
Start: 2024-01-02 | End: 2024-01-02 | Stop reason: HOSPADM

## 2024-01-02 RX ORDER — SODIUM CHLORIDE, SODIUM LACTATE, POTASSIUM CHLORIDE, CALCIUM CHLORIDE 600; 310; 30; 20 MG/100ML; MG/100ML; MG/100ML; MG/100ML
INJECTION, SOLUTION INTRAVENOUS CONTINUOUS PRN
Status: DISCONTINUED | OUTPATIENT
Start: 2024-01-02 | End: 2024-01-02 | Stop reason: SURG

## 2024-01-02 RX ORDER — VANCOMYCIN 1.75 GRAM/500 ML IN 0.9 % SODIUM CHLORIDE INTRAVENOUS
20 ONCE
Status: COMPLETED | OUTPATIENT
Start: 2024-01-02 | End: 2024-01-02

## 2024-01-02 RX ORDER — IPRATROPIUM BROMIDE AND ALBUTEROL SULFATE 2.5; .5 MG/3ML; MG/3ML
3 SOLUTION RESPIRATORY (INHALATION) ONCE AS NEEDED
Status: DISCONTINUED | OUTPATIENT
Start: 2024-01-02 | End: 2024-01-02 | Stop reason: HOSPADM

## 2024-01-02 RX ORDER — FLUMAZENIL 0.1 MG/ML
0.1 INJECTION INTRAVENOUS AS NEEDED
Status: DISCONTINUED | OUTPATIENT
Start: 2024-01-02 | End: 2024-01-02 | Stop reason: HOSPADM

## 2024-01-02 RX ORDER — KETOROLAC TROMETHAMINE 30 MG/ML
INJECTION, SOLUTION INTRAMUSCULAR; INTRAVENOUS AS NEEDED
Status: DISCONTINUED | OUTPATIENT
Start: 2024-01-02 | End: 2024-01-02 | Stop reason: SURG

## 2024-01-02 RX ORDER — EPHEDRINE SULFATE 5 MG/ML
5 INJECTION INTRAVENOUS ONCE AS NEEDED
Status: DISCONTINUED | OUTPATIENT
Start: 2024-01-02 | End: 2024-01-02 | Stop reason: HOSPADM

## 2024-01-02 RX ORDER — LABETALOL HYDROCHLORIDE 5 MG/ML
5 INJECTION, SOLUTION INTRAVENOUS
Status: DISCONTINUED | OUTPATIENT
Start: 2024-01-02 | End: 2024-01-02 | Stop reason: HOSPADM

## 2024-01-02 RX ADMIN — DEXAMETHASONE SODIUM PHOSPHATE 2 MG: 4 INJECTION, SOLUTION INTRAMUSCULAR; INTRAVENOUS at 18:20

## 2024-01-02 RX ADMIN — SODIUM CHLORIDE, SODIUM LACTATE, POTASSIUM CHLORIDE, AND CALCIUM CHLORIDE: .6; .31; .03; .02 INJECTION, SOLUTION INTRAVENOUS at 13:38

## 2024-01-02 RX ADMIN — Medication 10 ML: at 09:15

## 2024-01-02 RX ADMIN — HYDROMORPHONE HYDROCHLORIDE 0.5 MG: 1 INJECTION, SOLUTION INTRAMUSCULAR; INTRAVENOUS; SUBCUTANEOUS at 14:22

## 2024-01-02 RX ADMIN — ASPIRIN 81 MG CHEWABLE TABLET 162 MG: 81 TABLET CHEWABLE at 09:10

## 2024-01-02 RX ADMIN — DEXAMETHASONE SODIUM PHOSPHATE 2 MG: 4 INJECTION, SOLUTION INTRAMUSCULAR; INTRAVENOUS at 09:11

## 2024-01-02 RX ADMIN — METHOCARBAMOL 750 MG: 500 TABLET ORAL at 18:27

## 2024-01-02 RX ADMIN — HYDROMORPHONE HYDROCHLORIDE 0.5 MG: 1 INJECTION, SOLUTION INTRAMUSCULAR; INTRAVENOUS; SUBCUTANEOUS at 14:09

## 2024-01-02 RX ADMIN — MIDAZOLAM 2 MG: 1 INJECTION INTRAMUSCULAR; INTRAVENOUS at 13:38

## 2024-01-02 RX ADMIN — METHOCARBAMOL 750 MG: 500 TABLET ORAL at 09:10

## 2024-01-02 RX ADMIN — LISINOPRIL 40 MG: 20 TABLET ORAL at 09:09

## 2024-01-02 RX ADMIN — PROPOFOL 250 MG: 10 INJECTION, EMULSION INTRAVENOUS at 13:42

## 2024-01-02 RX ADMIN — SODIUM CHLORIDE 2000 MG: 900 INJECTION INTRAVENOUS at 11:55

## 2024-01-02 RX ADMIN — ONDANSETRON 4 MG: 2 INJECTION INTRAMUSCULAR; INTRAVENOUS at 13:58

## 2024-01-02 RX ADMIN — LIDOCAINE HYDROCHLORIDE 50 MG: 20 INJECTION, SOLUTION EPIDURAL; INFILTRATION; INTRACAUDAL; PERINEURAL at 13:42

## 2024-01-02 RX ADMIN — Medication 10 ML: at 20:38

## 2024-01-02 RX ADMIN — METHOCARBAMOL 750 MG: 500 TABLET ORAL at 02:04

## 2024-01-02 RX ADMIN — AMLODIPINE BESYLATE 10 MG: 5 TABLET ORAL at 09:10

## 2024-01-02 RX ADMIN — DEXAMETHASONE SODIUM PHOSPHATE 4 MG: 4 INJECTION, SOLUTION INTRAMUSCULAR; INTRAVENOUS at 13:58

## 2024-01-02 RX ADMIN — FENTANYL CITRATE 100 MCG: 50 INJECTION, SOLUTION INTRAMUSCULAR; INTRAVENOUS at 13:42

## 2024-01-02 RX ADMIN — KETOROLAC TROMETHAMINE 15 MG: 30 INJECTION, SOLUTION INTRAMUSCULAR; INTRAVENOUS at 09:08

## 2024-01-02 RX ADMIN — DEXAMETHASONE SODIUM PHOSPHATE 2 MG: 4 INJECTION, SOLUTION INTRAMUSCULAR; INTRAVENOUS at 02:05

## 2024-01-02 RX ADMIN — Medication 1750 MG: at 18:28

## 2024-01-02 RX ADMIN — METHOCARBAMOL 750 MG: 500 TABLET ORAL at 21:34

## 2024-01-02 RX ADMIN — KETOROLAC TROMETHAMINE 30 MG: 30 INJECTION, SOLUTION INTRAMUSCULAR at 14:34

## 2024-01-02 RX ADMIN — CEFAZOLIN 2 G: 1 INJECTION, POWDER, FOR SOLUTION INTRAMUSCULAR; INTRAVENOUS at 13:50

## 2024-01-02 NOTE — PLAN OF CARE
Problem: Adult Inpatient Plan of Care  Goal: Plan of Care Review  Outcome: Ongoing, Progressing  Flowsheets (Taken 1/2/2024 1618)  Progress: improving  Plan of Care Reviewed With: patient  Outcome Evaluation: Patient had right knee arthroscopy today, patient denies pain at this time, care continues  Goal: Patient-Specific Goal (Individualized)  Outcome: Ongoing, Progressing  Goal: Absence of Hospital-Acquired Illness or Injury  Outcome: Ongoing, Progressing  Intervention: Identify and Manage Fall Risk  Recent Flowsheet Documentation  Taken 1/2/2024 1600 by Petra Yip RN  Safety Promotion/Fall Prevention: safety round/check completed  Taken 1/2/2024 1559 by Petra Yip RN  Safety Promotion/Fall Prevention: safety round/check completed  Taken 1/2/2024 1210 by Petra Yip RN  Safety Promotion/Fall Prevention: patient off unit  Taken 1/2/2024 1200 by Petra Yip RN  Safety Promotion/Fall Prevention: safety round/check completed  Taken 1/2/2024 1001 by Petra Yip RN  Safety Promotion/Fall Prevention: safety round/check completed  Taken 1/2/2024 0801 by Petra Yip RN  Safety Promotion/Fall Prevention: safety round/check completed  Intervention: Prevent Skin Injury  Recent Flowsheet Documentation  Taken 1/2/2024 0801 by Petra Yip RN  Body Position: position changed independently  Intervention: Prevent and Manage VTE (Venous Thromboembolism) Risk  Recent Flowsheet Documentation  Taken 1/2/2024 0801 by Petra Yip RN  Range of Motion: active ROM (range of motion) encouraged  Intervention: Prevent Infection  Recent Flowsheet Documentation  Taken 1/2/2024 0801 by Petra Yip RN  Infection Prevention: single patient room provided  Goal: Optimal Comfort and Wellbeing  Outcome: Ongoing, Progressing  Intervention: Monitor Pain and Promote Comfort  Recent Flowsheet Documentation  Taken 1/2/2024 0908 by Petra Yip RN  Pain  Management Interventions:   see MAR   quiet environment facilitated  Taken 1/2/2024 0801 by Petra Yip RN  Pain Management Interventions:   see MAR   quiet environment facilitated  Goal: Readiness for Transition of Care  Outcome: Ongoing, Progressing  Goal: Plan of Care Review  Outcome: Ongoing, Progressing  Flowsheets (Taken 1/2/2024 1618)  Progress: improving  Plan of Care Reviewed With: patient  Outcome Evaluation: Patient had right knee arthroscopy today, patient denies pain at this time, care continues  Goal: Patient-Specific Goal (Individualized)  Outcome: Ongoing, Progressing  Goal: Absence of Hospital-Acquired Illness or Injury  Outcome: Ongoing, Progressing  Intervention: Identify and Manage Fall Risk  Recent Flowsheet Documentation  Taken 1/2/2024 1600 by Petra Yip RN  Safety Promotion/Fall Prevention: safety round/check completed  Taken 1/2/2024 1559 by Petra Yip RN  Safety Promotion/Fall Prevention: safety round/check completed  Taken 1/2/2024 1210 by Petra Yip RN  Safety Promotion/Fall Prevention: patient off unit  Taken 1/2/2024 1200 by Petra Yip RN  Safety Promotion/Fall Prevention: safety round/check completed  Taken 1/2/2024 1001 by Petra Yip RN  Safety Promotion/Fall Prevention: safety round/check completed  Taken 1/2/2024 0801 by Petra Yip RN  Safety Promotion/Fall Prevention: safety round/check completed  Intervention: Prevent Skin Injury  Recent Flowsheet Documentation  Taken 1/2/2024 0801 by Petra Yip RN  Body Position: position changed independently  Intervention: Prevent and Manage VTE (Venous Thromboembolism) Risk  Recent Flowsheet Documentation  Taken 1/2/2024 0801 by Petra Yip RN  Range of Motion: active ROM (range of motion) encouraged  Intervention: Prevent Infection  Recent Flowsheet Documentation  Taken 1/2/2024 0801 by Petra Yip RN  Infection Prevention: single  patient room provided  Goal: Optimal Comfort and Wellbeing  Outcome: Ongoing, Progressing  Intervention: Monitor Pain and Promote Comfort  Recent Flowsheet Documentation  Taken 1/2/2024 0908 by Petra Yip RN  Pain Management Interventions:   see MAR   quiet environment facilitated  Taken 1/2/2024 0801 by Petra Yip RN  Pain Management Interventions:   see MAR   quiet environment facilitated  Goal: Readiness for Transition of Care  Outcome: Ongoing, Progressing     Problem: Pain Acute  Goal: Acceptable Pain Control and Functional Ability  Outcome: Ongoing, Progressing  Intervention: Prevent or Manage Pain  Recent Flowsheet Documentation  Taken 1/2/2024 0801 by Petra Yip RN  Medication Review/Management: medications reviewed  Intervention: Develop Pain Management Plan  Recent Flowsheet Documentation  Taken 1/2/2024 0908 by Petra Yip RN  Pain Management Interventions:   see MAR   quiet environment facilitated  Taken 1/2/2024 0801 by Petra Yip RN  Pain Management Interventions:   see MAR   quiet environment facilitated  Intervention: Optimize Psychosocial Wellbeing  Recent Flowsheet Documentation  Taken 1/2/2024 0801 by Petra Yip RN  Supportive Measures: active listening utilized   Goal Outcome Evaluation:  Plan of Care Reviewed With: patient        Progress: improving  Outcome Evaluation: Patient had right knee arthroscopy today, patient denies pain at this time, care continues

## 2024-01-02 NOTE — OP NOTE
NAME: Damien Avalos     YOB: 1965    DATE OF SURGERY: 1/2/2024    PREOPERATIVE DIAGNOSIS: Right knee effusion with suspected infection    POSTOPERATIVE DIAGNOSIS: Same    PROCEDURE PERFORMED: Right knee arthroscopy with irrigation debridement and synovectomy  Fluid sent for culture as well as tissue    SURGEON:Casey Kwan M.D.    ASSISTANT: Dayanna lock np  Assistant was utilized for leg positioning running the arthroscopy shaver foot pump dressing placement sutures    Estimated Blood Loss: minimal  Specimens : Fluid cultures  Complications: none    DESCRIPTION OF PROCEDURE: The patient was taken to the operating room and placed in the supine position. After adequate induction of general anesthesia the leg was prepped and draped in the usual sterile fashion exsanguinated tourniquet placed.  The leg was not exsanguinated.    We started the case by aspirating 50 cc of straw-colored fluid with some precipitate from the suprapatellar region.  This was sent for culture  Next we used a culture swab to swab inside the joint which was sent for second culture  Diagnostic arthroscopy revealed some inflamed synovium  No gross purulence noted.  A shaver was introduced and a complete synovectomy starting in the suprapatellar pouch working into the medial gutter, anterior interval, lateral interval, lateral joint, medial joint was then performed  We irrigated with total of 6 L of fluid.  There was no obvious  Cartilage damage at this point.  Hemostasis was achieved  Sutures placed  The knee was injected with ropivacaine, Toradol, vancomycin and tranexamic acid   Sterile dressings were then applied the patient was extubated and transferred to the recovery room.  At the end of the case the sponge and needle counts were reported as being correct and there were no known complications.  Infectious diseases been consulted  Will continue antibiotics await cultures.    Casey Kwan M.D.  1/2/2024

## 2024-01-02 NOTE — PROGRESS NOTES
"Pharmacy Antimicrobial Dosing Service    Subjective:  Damien Avalos is a 58 y.o.male admitted with bone/joint infection. Pharmacy has been consulted to dose Vancomycin and Ceftriaxone for possible bone/joint infection.        Assessment/Plan    1. Day #1 Vancomycin: Goal -600 mcg*h/mL. Vancomycin 1750mg x 1 dose followed by vancomycin 1250mg IV q12h for predicted  mcg*h/mL. Will check peak and trough between third and fourth dose.     2. Day #1 Ceftriaxone: 2g IV q24h.    Will continue to monitor drug levels, renal function, culture and sensitivities, and patient clinical status.       Objective:  Relevant clinical data and objective history reviewed:  172.7 cm (68\")   105 kg (231 lb)   Ideal body weight: 68.4 kg (150 lb 12.7 oz)  Adjusted ideal body weight: 83 kg (182 lb 14 oz)  Body mass index is 35.12 kg/m².        Results from last 7 days   Lab Units 01/02/24  0626 01/01/24  0745   CREATININE mg/dL 0.88 1.16     Estimated Creatinine Clearance: 107.4 mL/min (by C-G formula based on SCr of 0.88 mg/dL).  I/O last 3 completed shifts:  In: 240 [P.O.:240]  Out: 1600 [Urine:1600]    Results from last 7 days   Lab Units 01/02/24  0432 01/01/24  0745   WBC 10*3/mm3 18.00* 16.30*     Temperature    01/02/24 1448 01/02/24 1609 01/02/24 1736   Temp: 97.9 °F (36.6 °C) 97.2 °F (36.2 °C) 97.6 °F (36.4 °C)     Baseline culture/source/susceptibility:  Microbiology Results (last 10 days)       Procedure Component Value - Date/Time    Wound Culture - Wound, Knee, Right [[id number]] Collected: 01/02/24 1400    Lab Status: Preliminary result Specimen: Wound from Knee, Right Updated: 01/02/24 1530     Gram Stain Moderate (3+) WBCs per low power field      No organisms seen    Blood Culture - Blood, Arm, Right [[id number]]  (Normal) Collected: 01/01/24 0757    Lab Status: Preliminary result Specimen: Blood from Arm, Right Updated: 01/02/24 0815     Blood Culture No growth at 24 hours    Blood Culture - Blood, Arm, Left " [[id number]]  (Normal) Collected: 01/01/24 0745    Lab Status: Preliminary result Specimen: Blood from Arm, Left Updated: 01/02/24 0801     Blood Culture No growth at 24 hours    MRSA Screen, PCR (Inpatient) - Swab, Nares [[id number]]  (Normal) Collected: 01/01/24 0745    Lab Status: Final result Specimen: Swab from Nares Updated: 01/01/24 0907     MRSA PCR No MRSA Detected    Narrative:      The negative predictive value of this diagnostic test is high and should only be used to consider de-escalating anti-MRSA therapy. A positive result may indicate colonization with MRSA and must be correlated clinically.            Jennifer Chakraborty [facility]  01/02/24 17:42 EST

## 2024-01-02 NOTE — ANESTHESIA PROCEDURE NOTES
Airway  Urgency: elective    Date/Time: 1/2/2024 1:43 PM  Airway not difficult    General Information and Staff    Patient location during procedure: OR    Indications and Patient Condition  Indications for airway management: airway protection    Preoxygenated: yes  MILS maintained throughout  Mask difficulty assessment: 1 - vent by mask    Final Airway Details  Final airway type: supraglottic airway      Successful airway: I-gel  Size 5     Number of attempts at approach: 1  Assessment: lips, teeth, and gum same as pre-op and atraumatic intubation

## 2024-01-02 NOTE — CASE MANAGEMENT/SOCIAL WORK
Discharge Planning Assessment  [facility]     Patient Name: Damien Avalos  MRN: [id number]  Today's Date: 1/2/2024    Admit Date: 1/1/2024    Plan: Return home   Discharge Needs Assessment       Row Name 01/02/24 1310       Living Environment    People in Home spouse    Name(s) of People in Home wife Aurelia    Current Living Arrangements home    Potentially Unsafe Housing Conditions none    Primary Care Provided by self    Provides Primary Care For no one    Family Caregiver if Needed spouse    Family Caregiver Names Aurelia    Quality of Family Relationships helpful;involved;supportive    Able to Return to Prior Arrangements yes       Resource/Environmental Concerns    Resource/Environmental Concerns none    Transportation Concerns none       Transition Planning    Patient/Family Anticipates Transition to home with family    Patient/Family Anticipated Services at Transition none    Transportation Anticipated car, drives self;family or friend will provide       Discharge Needs Assessment    Readmission Within the Last 30 Days no previous admission in last 30 days    Equipment Currently Used at Home walker, standard;crutches    Concerns to be Addressed denies needs/concerns at this time    Anticipated Changes Related to Illness none    Equipment Needed After Discharge none                   Discharge Plan       Row Name 01/02/24 1311       Plan    Plan Return home    Plan Comments CM met with patient at the bedside. Confirmed PCP, insurance, and pharmacy. Patient denies any difficulty affording medications. Patient is not current with any HHC/OPPT/OT services. Patient lives at home with spouse, IADLS, and drives. Patient's wife will drive patient home. DC Barriers: Ortho following, IV pain medicine and rocephin, knee arthroscopy today.                  Continued Care and Services - Admitted Since 1/1/2024    Coordination has not been started for this encounter.       Expected Discharge Date and Time        Expected Discharge Date Expected Discharge Time    Minh 3, 2024            Demographic Summary       Row Name 01/02/24 1310       General Information    Admission Type observation    Arrived From emergency department    Referral Source admission list    Reason for Consult discharge planning    Preferred Language English       Contact Information    Permission Granted to Share Info With     Contact Information Obtained for                    Functional Status       Row Name 01/02/24 1310       Functional Status    Usual Activity Tolerance moderate    Current Activity Tolerance moderate       Functional Status, IADL    Medications independent    Meal Preparation independent    Housekeeping independent    Laundry independent    Shopping independent       Mental Status    General Appearance WDL WDL       Mental Status Summary    Recent Changes in Mental Status/Cognitive Functioning no changes                Alfonso Randall RN     Cell number [phone]  Office number [phone]

## 2024-01-02 NOTE — H&P
"   History & Physical       Patient: Damien Avalos    Proposed Surgery and date: Procedure(s) (LRB):  KNEE ARTHROSCOPY (Right)  KNEE INCISION AND DRAINAGE (Right)     YOB: 1965    Medical Record Number: [id number]  Wt Readings from Last 3 Encounters:   01/01/24 105 kg (231 lb)   06/23/22 97.3 kg (214 lb 6.4 oz)   06/13/22 98.2 kg (216 lb 9.6 oz)     Ht Readings from Last 3 Encounters:   01/01/24 172.7 cm (68\")   06/23/22 172.7 cm (68\")   06/13/22 172.7 cm (68\")     Body mass index is 35.12 kg/m².  Facility age limit for growth %isac is 20 years.      Surgeon:  Dr. Casey Kwan M.D.        Chief Complaints: Pain    History of Present Illness: 58 y.o. male presents with right knee pain and swelling status postarthroscopy on the 22nd.  He states he was doing pretty well after surgery with just minimal pain started having some pain and swelling on Thursday.  Denies fevers or chills.     Allergies: No Known Allergies    Medications:   Home Medications:  No current facility-administered medications on file prior to encounter.     Current Outpatient Medications on File Prior to Encounter   Medication Sig    acetaminophen (TYLENOL) 325 MG tablet Take 2 tablets by mouth Every 6 (Six) Hours As Needed for Mild Pain.    amLODIPine (NORVASC) 10 MG tablet Take 1 tablet by mouth Daily.    aspirin 81 MG chewable tablet Chew 2 tablets Daily.    benazepril (LOTENSIN) 40 MG tablet Take 1 tablet by mouth Daily.    meloxicam (MOBIC) 15 MG tablet Take 1 tablet by mouth Daily.    methylPREDNISolone (MEDROL) 4 MG dose pack Take  by mouth 2 (Two) Times a Day. Take as directed on package instructions.    Multiple Vitamins-Minerals (MULTIVITAMIN ADULT) tablet Take 1 tablet by mouth Daily.    sulfamethoxazole-trimethoprim (BACTRIM DS,SEPTRA DS) 800-160 MG per tablet Take 1 tablet by mouth 2 (Two) Times a Day.    traMADol (ULTRAM) 50 MG tablet Take 1 tablet by mouth Every 6 (Six) Hours As Needed for Moderate Pain.     Current " Medications:  Scheduled Meds:amLODIPine, 10 mg, Oral, Daily  [MAR Hold] aspirin, 162 mg, Oral, Daily  cefTRIAXone, 2,000 mg, Intravenous, Daily  [MAR Hold] dexAMETHasone, 2 mg, Intravenous, Q8H  lisinopril, 40 mg, Oral, Q24H  [MAR Hold] methocarbamol, 750 mg, Oral, 4x Daily  [MAR Hold] senna-docusate sodium, 2 tablet, Oral, BID  [MAR Hold] sodium chloride, 10 mL, Intravenous, Q12H      Continuous Infusions:   PRN Meds:.  [MAR Hold] acetaminophen    [MAR Hold] senna-docusate sodium **AND** [MAR Hold] polyethylene glycol **AND** [MAR Hold] bisacodyl **AND** [MAR Hold] bisacodyl    [MAR Hold] ketorolac    [MAR Hold] nitroglycerin    [MAR Hold] ondansetron **OR** [MAR Hold] ondansetron    [COMPLETED] Insert Peripheral IV **AND** [MAR Hold] sodium chloride    [MAR Hold] sodium chloride    [MAR Hold] sodium chloride    [MAR Hold] traMADol    Past Medical History:   Diagnosis Date    Hypertension         Past Surgical History:   Procedure Laterality Date    SHOULDER SURGERY      right shoulder-slap tear        Social History     Occupational History    Not on file   Tobacco Use    Smoking status: Never    Smokeless tobacco: Never   Vaping Use    Vaping Use: Never used   Substance and Sexual Activity    Alcohol use: Never    Drug use: Never    Sexual activity: Defer      Social History     Social History Narrative    Not on file        Family History   Problem Relation Age of Onset    Heart disease Mother          Review of Systems: 14 point review of systems was performed and was negative except as documented in the history of present illness.      Physical Exam: 58 y.o. male    Vital signs reviewed.    General Appearance:    Alert, cooperative, in no acute distress                  General: alert, oriented  Eyes: conjunctiva clear  ENT: external ears and nose atraumatic  CV: no peripheral edema  Resp: normal respiratory effort  Skin: no rashes or wounds; normal turgor, no erythema today  Psych: mood and affect  appropriate  Lymph: no nodes appreciated  Neuro: gross sensation intact  Vascular:  Palpable peripheral pulse in noted extremity  Musculoskeletal Extremities:  tenderness over operative extremity. Moves all extremities well, no to minimal LE edema, no cyanosis, no redness  Some swelling in the right knee noted pain with range of motion            Diagnostic Tests:  Lab Results (last 7 days)       Procedure Component Value Units Date/Time    Blood Culture - Blood, Arm, Right [[id number]]  (Normal) Collected: 01/01/24 0757    Specimen: Blood from Arm, Right Updated: 01/02/24 0815     Blood Culture No growth at 24 hours    Blood Culture - Blood, Arm, Left [[id number]]  (Normal) Collected: 01/01/24 0745    Specimen: Blood from Arm, Left Updated: 01/02/24 0801     Blood Culture No growth at 24 hours    Basic Metabolic Panel [[id number]]  (Abnormal) Collected: 01/02/24 0626    Specimen: Blood from Arm, Left Updated: 01/02/24 0709     Glucose 124 mg/dL      BUN 23 mg/dL      Creatinine 0.88 mg/dL      Sodium 134 mmol/L      Potassium 4.5 mmol/L      Comment: Slight hemolysis detected by analyzer. Result may be falsely elevated.        Chloride 103 mmol/L      CO2 24.0 mmol/L      Calcium 9.1 mg/dL      BUN/Creatinine Ratio 26.1     Anion Gap 7.0 mmol/L      eGFR 99.7 mL/min/1.73     Narrative:      GFR Normal >60  Chronic Kidney Disease <60  Kidney Failure <15      CBC & Differential [[id number]]  (Abnormal) Collected: 01/02/24 0432    Specimen: Blood from Arm, Left Updated: 01/02/24 0451    Narrative:      The following orders were created for panel order CBC & Differential.  Procedure                               Abnormality         Status                     ---------                               -----------         ------                     CBC Auto Differential[[id number]]        Abnormal            Final result                 Please view results for these tests on the individual orders.    CBC Auto Differential  "[[id number]]  (Abnormal) Collected: 01/02/24 0432    Specimen: Blood from Arm, Left Updated: 01/02/24 0451     WBC 18.00 10*3/mm3      RBC 4.65 10*6/mm3      Hemoglobin 14.5 g/dL      Hematocrit 42.6 %      MCV 91.7 fL      MCH 31.2 pg      MCHC 34.0 g/dL      RDW 13.3 %      RDW-SD 42.0 fl      MPV 8.1 fL      Platelets 306 10*3/mm3      Neutrophil % 86.7 %      Lymphocyte % 8.2 %      Monocyte % 4.7 %      Eosinophil % 0.0 %      Basophil % 0.4 %      Neutrophils, Absolute 15.60 10*3/mm3      Lymphocytes, Absolute 1.50 10*3/mm3      Monocytes, Absolute 0.90 10*3/mm3      Eosinophils, Absolute 0.00 10*3/mm3      Basophils, Absolute 0.10 10*3/mm3      nRBC 0.0 /100 WBC     Procalcitonin [[id number]]  (Normal) Collected: 01/01/24 0745    Specimen: Blood Updated: 01/01/24 1303     Procalcitonin 0.09 ng/mL     Narrative:      As a Marker for Sepsis (Non-Neonates):    1. <0.5 ng/mL represents a low risk of severe sepsis and/or septic shock.  2. >2 ng/mL represents a high risk of severe sepsis and/or septic shock.    As a Marker for Lower Respiratory Tract Infections that require antibiotic therapy:    PCT on Admission    Antibiotic Therapy       6-12 Hrs later    >0.5                Strongly Recommended  >0.25 - <0.5        Recommended   0.1 - 0.25          Discouraged              Remeasure/reassess PCT  <0.1                Strongly Discouraged     Remeasure/reassess PCT    As 28 day mortality risk marker: \"Change in Procalcitonin Result\" (>80% or <=80%) if Day 0 (or Day 1) and Day 4 values are available. Refer to http://www.[facility]-pct-calculator.com    Change in PCT <=80%  A decrease of PCT levels below or equal to 80% defines a positive change in PCT test result representing a higher risk for 28-day all-cause mortality of patients diagnosed with severe sepsis for septic shock.    Change in PCT >80%  A decrease of PCT levels of more than 80% defines a negative change in PCT result representing a lower risk for 28-day " all-cause mortality of patients diagnosed with severe sepsis or septic shock.       MRSA Screen, PCR (Inpatient) - Swab, Nares [[id number]]  (Normal) Collected: 01/01/24 0745    Specimen: Swab from Nares Updated: 01/01/24 0907     MRSA PCR No MRSA Detected    Narrative:      The negative predictive value of this diagnostic test is high and should only be used to consider de-escalating anti-MRSA therapy. A positive result may indicate colonization with MRSA and must be correlated clinically.    Sedimentation Rate [[id number]]  (Abnormal) Collected: 01/01/24 0745    Specimen: Blood Updated: 01/01/24 0819     Sed Rate 27 mm/hr     Basic Metabolic Panel [[id number]]  (Abnormal) Collected: 01/01/24 0745    Specimen: Blood Updated: 01/01/24 0810     Glucose 105 mg/dL      BUN 23 mg/dL      Creatinine 1.16 mg/dL      Sodium 142 mmol/L      Potassium 4.4 mmol/L      Chloride 106 mmol/L      CO2 25.0 mmol/L      Calcium 9.6 mg/dL      BUN/Creatinine Ratio 19.8     Anion Gap 11.0 mmol/L      eGFR 73.0 mL/min/1.73     Narrative:      GFR Normal >60  Chronic Kidney Disease <60  Kidney Failure <15      C-reactive Protein [[id number]]  (Abnormal) Collected: 01/01/24 0745    Specimen: Blood Updated: 01/01/24 0810     C-Reactive Protein 15.98 mg/dL     CBC & Differential [[id number]]  (Abnormal) Collected: 01/01/24 0745    Specimen: Blood Updated: 01/01/24 0751    Narrative:      The following orders were created for panel order CBC & Differential.  Procedure                               Abnormality         Status                     ---------                               -----------         ------                     CBC Auto Differential[[id number]]        Abnormal            Final result                 Please view results for these tests on the individual orders.    CBC Auto Differential [[id number]]  (Abnormal) Collected: 01/01/24 0745    Specimen: Blood Updated: 01/01/24 0751     WBC 16.30 10*3/mm3      RBC 4.67 10*6/mm3       Hemoglobin 14.5 g/dL      Hematocrit 42.4 %      MCV 90.7 fL      MCH 31.0 pg      MCHC 34.2 g/dL      RDW 13.7 %      RDW-SD 43.3 fl      MPV 7.8 fL      Platelets 259 10*3/mm3      Neutrophil % 79.6 %      Lymphocyte % 11.7 %      Monocyte % 8.2 %      Eosinophil % 0.0 %      Basophil % 0.5 %      Neutrophils, Absolute 12.90 10*3/mm3      Lymphocytes, Absolute 1.90 10*3/mm3      Monocytes, Absolute 1.30 10*3/mm3      Eosinophils, Absolute 0.00 10*3/mm3      Basophils, Absolute 0.10 10*3/mm3      nRBC 0.0 /100 WBC             Radiology images/reports reviewed      Assessment: Suspected right knee septic arthritis    Plan: At this point given the presentation including swelling, pain with motion, elevated white count with a left shift, elevated CRP and sed rate he is indicated for surgical intervention.  Will plan to perform arthroscopy with thorough debridement and irrigation with tissue sample.  We talked about the pros and cons of open versus arthroscopic debridement.  We will plan on proceeding with surgery at patient's request. Dr. Kwan has reviewed details of procedure with patient today and discussed risks, benefits, alternatives, and limitations of the procedure in laymen's terms with the risks including but not limited to: Allergy to medication, need for future debridements,  need to stop the surgery early or change to a different procedure, stiffness requiring revision surgeries neurovascular damage, foot drop, bleeding, infection, chronic pain, worsening of pain, chondrolysis, recurrent problem,  swelling, loss of motion, weakness, stiffness, instability, DVT, pulmonary embolus, death, stroke, complex regional pain syndrome, and need for additional procedures.  No guarantees were given regarding results of surgery.   We will consult infectious disease.    Patient was given the opportunity to ask and have all questions answered today.  The patient voiced understanding of the risks, benefits, and  alternative forms of treatment that were discussed and the patient consents to proceed with surgery.     Discharge Plan: Home with f/u in with Dr. Kwan  Date: 1/2/2024  Casey Kwan MD

## 2024-01-02 NOTE — ANESTHESIA POSTPROCEDURE EVALUATION
Patient: Damien Avalos    Procedure Summary       Date: 01/02/24 Room / Location: [facility] OR  / [facility] MAIN OR    Anesthesia Start: 1338 Anesthesia Stop: 1510    Procedures:       KNEE ARTHROSCOPY (Right: Knee)      KNEE INCISION AND DRAINAGE (Right: Knee) Diagnosis:     Surgeons: Casey Kwan MD Provider: Bin Joseph MD    Anesthesia Type: general ASA Status: 2            Anesthesia Type: general    Vitals  Vitals Value Taken Time   /78 01/02/24 1515   Temp 97.9 °F (36.6 °C) 01/02/24 1448   Pulse 70 01/02/24 1515   Resp 11 01/02/24 1503   SpO2 91 % 01/02/24 1515   Vitals shown include unfiled device data.        Post Anesthesia Care and Evaluation    Patient location during evaluation: PACU  Patient participation: complete - patient participated  Level of consciousness: awake  Pain scale: See nurse's notes for pain score.  Pain management: adequate    Airway patency: patent  Anesthetic complications: No anesthetic complications  PONV Status: none  Cardiovascular status: acceptable  Respiratory status: acceptable and spontaneous ventilation  Hydration status: acceptable    Comments: Patient seen and examined postoperatively; vital signs stable; SpO2 greater than or equal to 90%; cardiopulmonary status stable; nausea/vomiting adequately controlled; pain adequately controlled; no apparent anesthesia complications; patient discharged from anesthesia care when discharge criteria were met

## 2024-01-02 NOTE — PLAN OF CARE
Problem: Adult Inpatient Plan of Care  Goal: Plan of Care Review  Outcome: Ongoing, Progressing  Flowsheets  Taken 1/2/2024 0131 by Carolyn Ricks RN  Plan of Care Reviewed With: patient  Taken 1/1/2024 1214 by Petra Yip RN  Outcome Evaluation: Patient admitted from ED with complaint of right lower extremity pain/redness/swelling. Patient had history of recent surgery and fluid aspiration. Pain medications given as directed, care continues.  Goal: Patient-Specific Goal (Individualized)  Outcome: Ongoing, Progressing  Goal: Absence of Hospital-Acquired Illness or Injury  Outcome: Ongoing, Progressing  Intervention: Identify and Manage Fall Risk  Recent Flowsheet Documentation  Taken 1/2/2024 0000 by Carolyn Ricks RN  Safety Promotion/Fall Prevention:   safety round/check completed   nonskid shoes/slippers when out of bed   lighting adjusted  Taken 1/1/2024 2200 by Carolyn Ricks RN  Safety Promotion/Fall Prevention:   safety round/check completed   nonskid shoes/slippers when out of bed   lighting adjusted  Taken 1/1/2024 2000 by Carolyn Ricks RN  Safety Promotion/Fall Prevention:   safety round/check completed   nonskid shoes/slippers when out of bed  Intervention: Prevent Skin Injury  Recent Flowsheet Documentation  Taken 1/1/2024 2000 by Carolyn Ricks RN  Body Position: position changed independently  Intervention: Prevent and Manage VTE (Venous Thromboembolism) Risk  Recent Flowsheet Documentation  Taken 1/2/2024 0000 by Carolyn Ricks RN  Activity Management: bedrest  Taken 1/1/2024 2200 by Carolyn Ricks RN  Activity Management: bedrest  Taken 1/1/2024 2000 by Carolyn Ricks RN  Activity Management: bedrest  Range of Motion: active ROM (range of motion) encouraged  Intervention: Prevent Infection  Recent Flowsheet Documentation  Taken 1/2/2024 0000 by Carolyn Ricks RN  Infection Prevention:   rest/sleep promoted   single patient room provided  Taken  1/1/2024 2200 by Carolyn Ricks RN  Infection Prevention:   rest/sleep promoted   single patient room provided  Taken 1/1/2024 2000 by Carolyn Ricks RN  Infection Prevention:   rest/sleep promoted   single patient room provided  Goal: Optimal Comfort and Wellbeing  Outcome: Ongoing, Progressing  Intervention: Provide Person-Centered Care  Recent Flowsheet Documentation  Taken 1/1/2024 2000 by Carolyn Ricks RN  Trust Relationship/Rapport: care explained  Goal: Readiness for Transition of Care  Outcome: Ongoing, Progressing  Goal: Plan of Care Review  Outcome: Ongoing, Progressing  Flowsheets (Taken 1/2/2024 0131)  Plan of Care Reviewed With: patient  Goal: Patient-Specific Goal (Individualized)  Outcome: Ongoing, Progressing  Goal: Absence of Hospital-Acquired Illness or Injury  Outcome: Ongoing, Progressing  Intervention: Identify and Manage Fall Risk  Recent Flowsheet Documentation  Taken 1/2/2024 0000 by Carolyn Ricks RN  Safety Promotion/Fall Prevention:   safety round/check completed   nonskid shoes/slippers when out of bed   lighting adjusted  Taken 1/1/2024 2200 by Carolyn Ricks RN  Safety Promotion/Fall Prevention:   safety round/check completed   nonskid shoes/slippers when out of bed   lighting adjusted  Taken 1/1/2024 2000 by Carolyn Ricks RN  Safety Promotion/Fall Prevention:   safety round/check completed   nonskid shoes/slippers when out of bed  Intervention: Prevent Skin Injury  Recent Flowsheet Documentation  Taken 1/1/2024 2000 by Carolyn Ricks RN  Body Position: position changed independently  Intervention: Prevent and Manage VTE (Venous Thromboembolism) Risk  Recent Flowsheet Documentation  Taken 1/2/2024 0000 by Carolyn Ricks RN  Activity Management: bedrest  Taken 1/1/2024 2200 by Carolyn Ricks RN  Activity Management: bedrest  Taken 1/1/2024 2000 by Carolyn Ricks RN  Activity Management: bedrest  Range of Motion: active ROM (range of  motion) encouraged  Intervention: Prevent Infection  Recent Flowsheet Documentation  Taken 1/2/2024 0000 by Carolyn Ricks, RN  Infection Prevention:   rest/sleep promoted   single patient room provided  Taken 1/1/2024 2200 by Carolyn Ricks RN  Infection Prevention:   rest/sleep promoted   single patient room provided  Taken 1/1/2024 2000 by Carolyn Ricks RN  Infection Prevention:   rest/sleep promoted   single patient room provided  Goal: Optimal Comfort and Wellbeing  Outcome: Ongoing, Progressing  Intervention: Provide Person-Centered Care  Recent Flowsheet Documentation  Taken 1/1/2024 2000 by Carolyn Ricks RN  Trust Relationship/Rapport: care explained  Goal: Readiness for Transition of Care  Outcome: Ongoing, Progressing   Goal Outcome Evaluation:  Plan of Care Reviewed With: patient

## 2024-01-02 NOTE — CONSULTS
Infectious Diseases Consult Note    Referring Provider: Dr. Kwan    Reason for Consultation: Right knee infection    Patient Care Team:  Brian Jones MD as PCP - General    Chief complaint right knee pain    Subjective     The patient has been afebrile for the last 24 hours.  The patient is on  3 L of oxygen by nasal cannula hemodynamically stable, and is tolerating antimicrobial therapy.  Patient is status post surgery    History of present illness:      This is a 58-year-old male presents to the hospital on 1/1/2024 complaints of worsening right knee pain.  Symptoms started last Thursday and progressively worsened.  Patient did have a aspiration in the orthopedic surgeons office on 12/22/2024.  He was started on steroids.  Admits to pain swelling and redness of the right knee that streaks to the lower leg.  Patient denies fever, chills, diaphoresis, shortness of breath, cough, GI symptoms, or any urinary symptoms.  Patient is status post right knee incision and drainage on 1/2/2024    Review of Systems   Review of Systems   Constitutional: Negative.    HENT: Negative.     Eyes: Negative.    Respiratory: Negative.     Cardiovascular: Negative.    Gastrointestinal: Negative.    Endocrine: Negative.    Genitourinary: Negative.    Musculoskeletal:  Positive for joint swelling.   Skin:  Positive for color change.   Neurological: Negative.    Psychiatric/Behavioral: Negative.     All other systems reviewed and are negative.      Medications  Medications Prior to Admission   Medication Sig Dispense Refill Last Dose    acetaminophen (TYLENOL) 325 MG tablet Take 2 tablets by mouth Every 6 (Six) Hours As Needed for Mild Pain.       amLODIPine (NORVASC) 10 MG tablet Take 1 tablet by mouth Daily. 90 tablet 3 1/1/2024    aspirin 81 MG chewable tablet Chew 2 tablets Daily.   1/1/2024    benazepril (LOTENSIN) 40 MG tablet Take 1 tablet by mouth Daily. 90 tablet 3 1/1/2024    meloxicam (MOBIC) 15 MG tablet Take 1  tablet by mouth Daily.   12/31/2023    methylPREDNISolone (MEDROL) 4 MG dose pack Take  by mouth 2 (Two) Times a Day. Take as directed on package instructions.   1/1/2024    Multiple Vitamins-Minerals (MULTIVITAMIN ADULT) tablet Take 1 tablet by mouth Daily.   1/1/2024    sulfamethoxazole-trimethoprim (BACTRIM DS,SEPTRA DS) 800-160 MG per tablet Take 1 tablet by mouth 2 (Two) Times a Day.   1/1/2024    traMADol (ULTRAM) 50 MG tablet Take 1 tablet by mouth Every 6 (Six) Hours As Needed for Moderate Pain.          History  Past Medical History:   Diagnosis Date    Hypertension      Past Surgical History:   Procedure Laterality Date    SHOULDER SURGERY      right shoulder-slap tear       Family History  Family History   Problem Relation Age of Onset    Heart disease Mother        Social History   reports that he has never smoked. He has never used smokeless tobacco. He reports that he does not drink alcohol and does not use drugs.    Allergies  Patient has no known allergies.    Objective     Vital Signs   Vital Signs (last 24 hours)         01/01 0700  01/02 0659 01/02 0700  01/02 1614   Most Recent      Temp (°F) 97.6 -  98.8    97.2 -  98.2     97.2 (36.2) 01/02 1609    Heart Rate 70 -  84    75 -  82     76 01/02 1548    Resp 15 -  20    10 -  21     14 01/02 1609    /69 -  153/86    111/71 -  132/76     111/71 01/02 1609    SpO2 (%) 93 -  98    86 -  98     92 01/02 1609    Flow (L/min)   1 -  6     3 01/02 1609            Physical Exam:  Physical Exam  Vitals and nursing note reviewed.   Constitutional:       General: He is not in acute distress.     Appearance: Normal appearance. He is well-developed and normal weight. He is not diaphoretic.   HENT:      Head: Normocephalic and atraumatic.   Eyes:      Conjunctiva/sclera: Conjunctivae normal.      Pupils: Pupils are equal, round, and reactive to light.   Cardiovascular:      Rate and Rhythm: Normal rate and regular rhythm.      Heart sounds: Normal heart  sounds, S1 normal and S2 normal.   Pulmonary:      Effort: Pulmonary effort is normal. No respiratory distress.      Breath sounds: Normal breath sounds. No stridor. No wheezing or rales.   Abdominal:      General: Bowel sounds are normal. There is no distension.      Palpations: Abdomen is soft. There is no mass.      Tenderness: There is no abdominal tenderness. There is no guarding.   Musculoskeletal:         General: No deformity. Normal range of motion.      Cervical back: Neck supple.      Comments: Surgical dressing on right knee   Skin:     General: Skin is warm and dry.      Coloration: Skin is not pale.      Findings: No erythema or rash.   Neurological:      Mental Status: He is alert and oriented to person, place, and time.      Cranial Nerves: No cranial nerve deficit.   Psychiatric:         Mood and Affect: Mood normal.         Microbiology  Microbiology Results (last 10 days)       Procedure Component Value - Date/Time    Wound Culture - Wound, Knee, Right [[id number]] Collected: 01/02/24 1400    Lab Status: Preliminary result Specimen: Wound from Knee, Right Updated: 01/02/24 1530     Gram Stain Moderate (3+) WBCs per low power field      No organisms seen    Blood Culture - Blood, Arm, Right [[id number]]  (Normal) Collected: 01/01/24 0757    Lab Status: Preliminary result Specimen: Blood from Arm, Right Updated: 01/02/24 0815     Blood Culture No growth at 24 hours    Blood Culture - Blood, Arm, Left [[id number]]  (Normal) Collected: 01/01/24 0745    Lab Status: Preliminary result Specimen: Blood from Arm, Left Updated: 01/02/24 0801     Blood Culture No growth at 24 hours    MRSA Screen, PCR (Inpatient) - Swab, Nares [[id number]]  (Normal) Collected: 01/01/24 0745    Lab Status: Final result Specimen: Swab from Nares Updated: 01/01/24 0907     MRSA PCR No MRSA Detected    Narrative:      The negative predictive value of this diagnostic test is high and should only be used to consider de-escalating  anti-MRSA therapy. A positive result may indicate colonization with MRSA and must be correlated clinically.            Laboratory  Results from last 7 days   Lab Units 01/02/24  0432   WBC 10*3/mm3 18.00*   HEMOGLOBIN g/dL 14.5   HEMATOCRIT % 42.6   PLATELETS 10*3/mm3 306     Results from last 7 days   Lab Units 01/02/24  0626   SODIUM mmol/L 134*   POTASSIUM mmol/L 4.5   CHLORIDE mmol/L 103   CO2 mmol/L 24.0   BUN mg/dL 23*   CREATININE mg/dL 0.88   GLUCOSE mg/dL 124*   CALCIUM mg/dL 9.1     Results from last 7 days   Lab Units 01/02/24  0626   SODIUM mmol/L 134*   POTASSIUM mmol/L 4.5   CHLORIDE mmol/L 103   CO2 mmol/L 24.0   BUN mg/dL 23*   CREATININE mg/dL 0.88   GLUCOSE mg/dL 124*   CALCIUM mg/dL 9.1         Results from last 7 days   Lab Units 01/01/24  0745   SED RATE mm/hr 27*           Radiology  Imaging Results (Last 72 Hours)       Procedure Component Value Units Date/Time    XR Knee 3 View Right [[id number]] Collected: 01/01/24 0832     Updated: 01/01/24 0835    Narrative:      XR KNEE 3 VW RIGHT    Date of Exam: 1/1/2024 8:20 AM EST    Indication: recent surg, swelling; recent xr at priority with subsequent arthrocentesis    Comparison: Right knee radiographs dated 12/29/2023    Findings:  There is no acute fracture or dislocation. The joint spaces appear normally aligned and well maintained. There is no osseous erosion or chondrocalcinosis. Bone mineralization is normal. There is a small suprapatellar joint effusion. There is anterior   soft tissue swelling.      Impression:      Impression:  1. No acute osseous abnormality of the right knee.  2. Small suprapatellar joint effusion. Anterior soft tissue swelling.    Electronically Signed: Juan Manuel Vita    1/1/2024 8:33 AM EST    Workstation ID: [id number]            Cardiology      Results Review:  I have reviewed all clinical data, test, lab, and imaging results.       Schedule Meds  amLODIPine, 10 mg, Oral, Daily  [START ON 1/3/2024] aspirin, 81 mg,  Oral, BID  cefTRIAXone, 2,000 mg, Intravenous, Daily  dexAMETHasone, 2 mg, Intravenous, Q8H  lisinopril, 40 mg, Oral, Q24H  methocarbamol, 750 mg, Oral, 4x Daily  senna-docusate sodium, 2 tablet, Oral, BID  sodium chloride, 10 mL, Intravenous, Q12H        Infusion Meds  sodium chloride, 1,000 mL        PRN Meds    acetaminophen    senna-docusate sodium **AND** polyethylene glycol **AND** bisacodyl **AND** bisacodyl    HYDROcodone-acetaminophen    ketorolac    nitroglycerin    ondansetron **OR** [DISCONTINUED] ondansetron    [COMPLETED] Insert Peripheral IV **AND** sodium chloride    sodium chloride    sodium chloride    traMADol      Assessment & Plan       Assessment    Right native knee infection in patient who is immunocompetent.  The patient is s/p I&D and washout on January 2, 2024.  Intraoperative cultures are pending    S/p right knee arthroscopy on December 22, 2023.        Plan    Continue IV ceftriaxone 2 g every 24 hours  Start IV Vancomycin- ask pharmacy to monitor and dose  Waiting on interoperative cultures  Continue supportive care  A.m. labs   Case discussed with patient and family member at beside  Case discussed with orthopedic surgery service      Sofia Townsend, APRN  01/02/24  16:14 EST    Note is dictated utilizing voice recognition software/Dragon

## 2024-01-02 NOTE — ANESTHESIA PREPROCEDURE EVALUATION
Anesthesia Evaluation     NPO Solid Status: > 8 hours  NPO Liquid Status: > 8 hours           Airway   Mallampati: I  TM distance: >3 FB  Neck ROM: full  No difficulty expected  Dental - normal exam     Pulmonary - normal exam   Cardiovascular - normal exam    (+) hypertension well controlled      Neuro/Psych  GI/Hepatic/Renal/Endo      Musculoskeletal     Abdominal  - normal exam    Bowel sounds: normal.   Substance History      OB/GYN          Other                    Anesthesia Plan    ASA 2     general     intravenous induction     Anesthetic plan, risks, benefits, and alternatives have been provided, discussed and informed consent has been obtained with: patient.  Pre-procedure education provided  Plan discussed with CRNA.    CODE STATUS:    Level Of Support Discussed With: Patient  Code Status (Patient has no pulse and is not breathing): CPR (Attempt to Resuscitate)  Medical Interventions (Patient has pulse or is breathing): Full Support

## 2024-01-02 NOTE — PROGRESS NOTES
[facility] Observation Unit Progress Note     Patient Name: Damien Avalos  : 1965  MRN: [id number]  Primary Care Physician: Brian Jones MD  Date of admission: 2024     Patient Care Team:  Brian Jones MD as PCP - General          Subjective   History Present Illness     Chief Complaint:   Chief Complaint   Patient presents with    Leg Pain     Left knee pain started Thursday night, was here on Thursday for the same.  Pt seen at Dr. Kwan office and had knee drained, told to come back to ER if pain persist.       Leg Pain    Mr. Avalos is a 58 y.o.  presents to [facility] complaining of leg pain      History of Present Illness    ED 24: 58 y.o. male presents with right knee pain. Onset of symptoms was Thursday evening and has been progressively worsening despite more conservative treatment measures including ice, rest and elevation.  Symptoms are associated with ability to move, exercise, and perform activities of daily living.  Symptoms are aggravated by weight bearing and ROM necessary for activities of daily living.   Symptoms improve with rest, ice and elevation only minimally. He had an aspiration in the office on Friday with cloudy aspirate noted. It was sent for culture. He was started on antibiotics and steriods on Friday as well. He came back to the hospital to get labs on Saturday , however, there was not a lab order. He presented back to the emergency room this am with continued complaints of pain and swelling.      Observation 24: Patient is a 58-year-old male presenting to the hospital with right knee pain.  Patient states symptoms of body aches chills and increased knee pain started Thursday.  Patient recently had aspiration office with orthopedic physician and noted cloudy aspiration culture sent.  Patient was started on antibiotics and steroids on Friday.  Patient states he is sent to hospital to get labs but order not present per staff.  Patient  reported increased swelling and redness with streaking to right lower extremity.  Denies shortness of breath, chest pain, nausea, vomiting falls or recent injury.    1/2/24: Patient reports improved swelling and decreased redness to right lower extremity today.  Patient reports some continued pain to right knee but able to ambulate and apply pressure.    Review of Systems   Constitutional: Negative. Negative for fever.   HENT: Negative.     Eyes: Negative.    Cardiovascular: Negative.    Respiratory: Negative.     Endocrine: Negative.    Hematologic/Lymphatic: Negative.    Skin:  Positive for color change.   Musculoskeletal:  Positive for joint pain and joint swelling.   Gastrointestinal: Negative.    Genitourinary: Negative.    Neurological: Negative.    Psychiatric/Behavioral: Negative.     Allergic/Immunologic: Negative.            Personal History     Past Medical History:   Past Medical History:   Diagnosis Date    Hypertension        Surgical History:      Past Surgical History:   Procedure Laterality Date    SHOULDER SURGERY      right shoulder-slap tear           Family History: family history includes Heart disease in his mother. Otherwise pertinent FHx was reviewed and unremarkable.     Social History:  reports that he has never smoked. He has never used smokeless tobacco. He reports that he does not drink alcohol and does not use drugs.      Medications:  Prior to Admission medications    Medication Sig Start Date End Date Taking? Authorizing Provider   acetaminophen (TYLENOL) 325 MG tablet Take 2 tablets by mouth Every 6 (Six) Hours As Needed for Mild Pain.   Yes ProviderPili MD   amLODIPine (NORVASC) 10 MG tablet Take 1 tablet by mouth Daily. 6/23/22  Yes Brian Jones MD   aspirin 81 MG chewable tablet Chew 2 tablets Daily.   Yes ProviderPili MD   benazepril (LOTENSIN) 40 MG tablet Take 1 tablet by mouth Daily. 6/23/22  Yes Brian Jones MD   meloxicam (MOBIC) 15  MG tablet Take 1 tablet by mouth Daily.   Yes Pili Gonzalez MD   methylPREDNISolone (MEDROL) 4 MG dose pack Take  by mouth 2 (Two) Times a Day. Take as directed on package instructions. 12/29/23 1/3/24 Yes Pili Gonzalez MD   Multiple Vitamins-Minerals (MULTIVITAMIN ADULT) tablet Take 1 tablet by mouth Daily.   Yes Pili Gonzalez MD   sulfamethoxazole-trimethoprim (BACTRIM DS,SEPTRA DS) 800-160 MG per tablet Take 1 tablet by mouth 2 (Two) Times a Day. 23 Yes Pili Gonzalez MD   traMADol (ULTRAM) 50 MG tablet Take 1 tablet by mouth Every 6 (Six) Hours As Needed for Moderate Pain.    ProviderPili MD       Allergies:  No Known Allergies    Objective   Objective     Vital Signs  Temp:  [97.6 °F (36.4 °C)-98.8 °F (37.1 °C)] 97.9 °F (36.6 °C)  Heart Rate:  [70-84] 70  Resp:  [15-20] 16  BP: (119-153)/(69-86) 124/79  SpO2:  [93 %-98 %] 95 %  on   ;   Device (Oxygen Therapy): room air  Body mass index is 35.12 kg/m².    Physical Exam  Vitals and nursing note reviewed.   Constitutional:       Appearance: Normal appearance.   HENT:      Head: Normocephalic and atraumatic.      Right Ear: External ear normal.      Left Ear: External ear normal.      Nose: Nose normal.      Mouth/Throat:      Pharynx: Oropharynx is clear.   Eyes:      Extraocular Movements: Extraocular movements intact.      Conjunctiva/sclera: Conjunctivae normal.      Pupils: Pupils are equal, round, and reactive to light.   Cardiovascular:      Rate and Rhythm: Normal rate and regular rhythm.      Pulses: Normal pulses.      Heart sounds: Normal heart sounds.   Pulmonary:      Effort: Pulmonary effort is normal.      Breath sounds: Normal breath sounds.   Abdominal:      General: Bowel sounds are normal.      Palpations: Abdomen is soft.   Musculoskeletal:         General: Swelling and tenderness present.      Cervical back: Normal range of motion.      Right lower le+ Edema present.   Skin:     General:  Skin is warm.      Capillary Refill: Capillary refill takes less than 2 seconds.   Neurological:      Mental Status: He is alert and oriented to person, place, and time.   Psychiatric:         Mood and Affect: Mood normal.         Behavior: Behavior normal.         Thought Content: Thought content normal.         Judgment: Judgment normal.           Results Review:  I have personally reviewed most recent cardiac tracings, lab results, microbiology results, and radiology images and interpretations and agree with findings, most notably: CBC, CMP, ESR, CRP, x-ray.    Results from last 7 days   Lab Units 01/02/24  0432   WBC 10*3/mm3 18.00*   HEMOGLOBIN g/dL 14.5   HEMATOCRIT % 42.6   PLATELETS 10*3/mm3 306     Results from last 7 days   Lab Units 01/02/24  0626 01/01/24  0745   SODIUM mmol/L 134* 142   POTASSIUM mmol/L 4.5 4.4   CHLORIDE mmol/L 103 106   CO2 mmol/L 24.0 25.0   BUN mg/dL 23* 23*   CREATININE mg/dL 0.88 1.16   GLUCOSE mg/dL 124* 105*   CALCIUM mg/dL 9.1 9.6   PROCALCITONIN ng/mL  --  0.09     Estimated Creatinine Clearance: 107.4 mL/min (by C-G formula based on SCr of 0.88 mg/dL).  Brief Urine Lab Results       None            Microbiology Results (last 10 days)       Procedure Component Value - Date/Time    Blood Culture - Blood, Arm, Right [[id number]]  (Normal) Collected: 01/01/24 0757    Lab Status: Preliminary result Specimen: Blood from Arm, Right Updated: 01/02/24 0815     Blood Culture No growth at 24 hours    Blood Culture - Blood, Arm, Left [[id number]]  (Normal) Collected: 01/01/24 0745    Lab Status: Preliminary result Specimen: Blood from Arm, Left Updated: 01/02/24 0801     Blood Culture No growth at 24 hours    MRSA Screen, PCR (Inpatient) - Swab, Nares [[id number]]  (Normal) Collected: 01/01/24 0745    Lab Status: Final result Specimen: Swab from Nares Updated: 01/01/24 0907     MRSA PCR No MRSA Detected    Narrative:      The negative predictive value of this diagnostic test is high and  should only be used to consider de-escalating anti-MRSA therapy. A positive result may indicate colonization with MRSA and must be correlated clinically.            ECG/EMG Results (most recent)       Procedure Component Value Units Date/Time    SCANNED - TELEMETRY   [[id number]] Resulted: 01/01/24     Updated: 01/02/24 0856    SCANNED - TELEMETRY   [[id number]] Resulted: 01/01/24     Updated: 01/02/24 0856            Results for orders placed during the hospital encounter of 12/29/23    Duplex venous lower extremity right CAR    Interpretation Summary    Normal right lower extremity venous duplex scan.          XR Knee 3 View Right    Result Date: 1/1/2024  Impression: 1. No acute osseous abnormality of the right knee. 2. Small suprapatellar joint effusion. Anterior soft tissue swelling. Electronically Signed: Juan Manuel Alexiselor  1/1/2024 8:33 AM EST  Workstation ID: [id number]       Estimated Creatinine Clearance: 107.4 mL/min (by C-G formula based on SCr of 0.88 mg/dL).    Assessment & Plan   Assessment/Plan       Active Hospital Problems    Diagnosis  POA    **Cellulitis [L03.90]  Yes      Resolved Hospital Problems   No resolved problems to display.       Cellulitis of right knee  -XR right knee: No acute osseous process seen, small suprapatellar joint effusion with anterior soft tissues swelling  -Blood cultures pending  -MRSA negative  -CRP 15.98, sed rate 27  -IV/oral analgesics and antibiotics as needed  -Continue IV Rocephin from ED  -WBC 16.3, Pro-Danyel 0.09, monitor trend  -Continue tramadol, IV Toradol, steroids  -Orthopedics consulted     Hypertension      BP Readings from Last 1 Encounters:   01/01/24 119/69   - Continue aspirin, amlodipine, lisinopril  - Monitor while admitted          VTE Prophylaxis -   Mechanical Order History:        Ordered        01/01/24 1104  Place Sequential Compression Device  Once            01/01/24 1104  Maintain Sequential Compression Device  Continuous                           Pharmalogical Order History:       None            CODE STATUS:    Code Status and Medical Interventions:   Ordered at: 01/01/24 1045     Level Of Support Discussed With:    Patient     Code Status (Patient has no pulse and is not breathing):    CPR (Attempt to Resuscitate)     Medical Interventions (Patient has pulse or is breathing):    Full Support       This patient has been examined wearing personal protective equipment.     I discussed the patient's findings and my recommendations with patient, family, nursing staff, primary care team, and consulting provider.      Signature:Electronically signed by JAZ Wills, 01/02/24, 10:50 AM EST.      I spent 35 minutes caring for Damien on this date of service. This time includes time spent by me in the following activities: reviewing tests, obtaining and/or reviewing a separately obtained history, performing a medically appropriate examination and/or evaluation, counseling and educating the patient/family/caregiver, ordering medications, tests, or procedures, referring and communicating with other health care professionals, documenting information in the medical record, independently interpreting results and communicating that information with the patient/family/caregiver, and care coordination.

## 2024-01-03 LAB
ANION GAP SERPL CALCULATED.3IONS-SCNC: 9 MMOL/L (ref 5–15)
BASOPHILS # BLD AUTO: 0 10*3/MM3 (ref 0–0.2)
BASOPHILS NFR BLD AUTO: 0.2 % (ref 0–1.5)
BUN SERPL-MCNC: 31 MG/DL (ref 6–20)
BUN/CREAT SERPL: 30.7 (ref 7–25)
CALCIUM SPEC-SCNC: 9.5 MG/DL (ref 8.6–10.5)
CHLORIDE SERPL-SCNC: 102 MMOL/L (ref 98–107)
CO2 SERPL-SCNC: 26 MMOL/L (ref 22–29)
CREAT SERPL-MCNC: 1.01 MG/DL (ref 0.76–1.27)
DEPRECATED RDW RBC AUTO: 43.8 FL (ref 37–54)
EGFRCR SERPLBLD CKD-EPI 2021: 86.2 ML/MIN/1.73
EOSINOPHIL # BLD AUTO: 0 10*3/MM3 (ref 0–0.4)
EOSINOPHIL NFR BLD AUTO: 0 % (ref 0.3–6.2)
ERYTHROCYTE [DISTWIDTH] IN BLOOD BY AUTOMATED COUNT: 13.5 % (ref 12.3–15.4)
GLUCOSE SERPL-MCNC: 178 MG/DL (ref 65–99)
HCT VFR BLD AUTO: 41.2 % (ref 37.5–51)
HGB BLD-MCNC: 14.1 G/DL (ref 13–17.7)
LYMPHOCYTES # BLD AUTO: 1.7 10*3/MM3 (ref 0.7–3.1)
LYMPHOCYTES NFR BLD AUTO: 8.1 % (ref 19.6–45.3)
MCH RBC QN AUTO: 31.4 PG (ref 26.6–33)
MCHC RBC AUTO-ENTMCNC: 34.2 G/DL (ref 31.5–35.7)
MCV RBC AUTO: 91.9 FL (ref 79–97)
MONOCYTES # BLD AUTO: 1.2 10*3/MM3 (ref 0.1–0.9)
MONOCYTES NFR BLD AUTO: 5.9 % (ref 5–12)
NEUTROPHILS NFR BLD AUTO: 17.5 10*3/MM3 (ref 1.7–7)
NEUTROPHILS NFR BLD AUTO: 85.8 % (ref 42.7–76)
NRBC BLD AUTO-RTO: 0 /100 WBC (ref 0–0.2)
PLATELET # BLD AUTO: 330 10*3/MM3 (ref 140–450)
PMV BLD AUTO: 7.9 FL (ref 6–12)
POTASSIUM SERPL-SCNC: 4.9 MMOL/L (ref 3.5–5.2)
RBC # BLD AUTO: 4.49 10*6/MM3 (ref 4.14–5.8)
SODIUM SERPL-SCNC: 137 MMOL/L (ref 136–145)
VANCOMYCIN TROUGH SERPL-MCNC: 14.4 MCG/ML (ref 5–20)
WBC NRBC COR # BLD AUTO: 20.4 10*3/MM3 (ref 3.4–10.8)

## 2024-01-03 PROCEDURE — 25810000003 SODIUM CHLORIDE 0.9 % SOLUTION 250 ML FLEX CONT: Performed by: NURSE PRACTITIONER

## 2024-01-03 PROCEDURE — 80202 ASSAY OF VANCOMYCIN: CPT | Performed by: NURSE PRACTITIONER

## 2024-01-03 PROCEDURE — 25010000002 VANCOMYCIN HCL 1.25 G RECONSTITUTED SOLUTION 1 EACH VIAL: Performed by: NURSE PRACTITIONER

## 2024-01-03 PROCEDURE — 85025 COMPLETE CBC W/AUTO DIFF WBC: CPT | Performed by: NURSE PRACTITIONER

## 2024-01-03 PROCEDURE — 25810000003 SODIUM CHLORIDE 0.9 % SOLUTION: Performed by: NURSE PRACTITIONER

## 2024-01-03 PROCEDURE — 63710000001 ONDANSETRON PER 8 MG: Performed by: NURSE PRACTITIONER

## 2024-01-03 PROCEDURE — 25010000002 CEFTRIAXONE PER 250 MG: Performed by: NURSE PRACTITIONER

## 2024-01-03 PROCEDURE — 25010000002 ENOXAPARIN PER 10 MG: Performed by: INTERNAL MEDICINE

## 2024-01-03 PROCEDURE — 80048 BASIC METABOLIC PNL TOTAL CA: CPT | Performed by: NURSE PRACTITIONER

## 2024-01-03 PROCEDURE — 25010000002 DEXAMETHASONE PER 1 MG: Performed by: NURSE PRACTITIONER

## 2024-01-03 RX ORDER — SODIUM CHLORIDE 0.9 % (FLUSH) 0.9 %
10 SYRINGE (ML) INJECTION EVERY 12 HOURS SCHEDULED
Status: DISCONTINUED | OUTPATIENT
Start: 2024-01-03 | End: 2024-01-05 | Stop reason: HOSPADM

## 2024-01-03 RX ORDER — BISACODYL 5 MG/1
5 TABLET, DELAYED RELEASE ORAL DAILY PRN
Status: DISCONTINUED | OUTPATIENT
Start: 2024-01-03 | End: 2024-01-05 | Stop reason: HOSPADM

## 2024-01-03 RX ORDER — POLYETHYLENE GLYCOL 3350 17 G/17G
17 POWDER, FOR SOLUTION ORAL DAILY PRN
Status: DISCONTINUED | OUTPATIENT
Start: 2024-01-03 | End: 2024-01-05 | Stop reason: HOSPADM

## 2024-01-03 RX ORDER — SODIUM CHLORIDE 0.9 % (FLUSH) 0.9 %
10 SYRINGE (ML) INJECTION AS NEEDED
Status: DISCONTINUED | OUTPATIENT
Start: 2024-01-03 | End: 2024-01-05 | Stop reason: HOSPADM

## 2024-01-03 RX ORDER — SODIUM CHLORIDE 9 MG/ML
40 INJECTION, SOLUTION INTRAVENOUS AS NEEDED
Status: DISCONTINUED | OUTPATIENT
Start: 2024-01-03 | End: 2024-01-05 | Stop reason: HOSPADM

## 2024-01-03 RX ORDER — AMOXICILLIN 250 MG
2 CAPSULE ORAL 2 TIMES DAILY
Status: DISCONTINUED | OUTPATIENT
Start: 2024-01-03 | End: 2024-01-05 | Stop reason: HOSPADM

## 2024-01-03 RX ORDER — BISACODYL 10 MG
10 SUPPOSITORY, RECTAL RECTAL DAILY PRN
Status: DISCONTINUED | OUTPATIENT
Start: 2024-01-03 | End: 2024-01-05 | Stop reason: HOSPADM

## 2024-01-03 RX ORDER — ENOXAPARIN SODIUM 100 MG/ML
40 INJECTION SUBCUTANEOUS DAILY
Status: DISCONTINUED | OUTPATIENT
Start: 2024-01-03 | End: 2024-01-05 | Stop reason: HOSPADM

## 2024-01-03 RX ADMIN — LISINOPRIL 40 MG: 20 TABLET ORAL at 09:07

## 2024-01-03 RX ADMIN — METHOCARBAMOL 750 MG: 500 TABLET ORAL at 09:08

## 2024-01-03 RX ADMIN — SODIUM CHLORIDE 1000 ML: 9 INJECTION, SOLUTION INTRAVENOUS at 02:13

## 2024-01-03 RX ADMIN — Medication 10 ML: at 21:36

## 2024-01-03 RX ADMIN — ASPIRIN 81 MG CHEWABLE TABLET 81 MG: 81 TABLET CHEWABLE at 09:07

## 2024-01-03 RX ADMIN — VANCOMYCIN HYDROCHLORIDE 1250 MG: 1.25 INJECTION, POWDER, LYOPHILIZED, FOR SOLUTION INTRAVENOUS at 05:32

## 2024-01-03 RX ADMIN — CEFTRIAXONE 2000 MG: 2 INJECTION, POWDER, FOR SOLUTION INTRAMUSCULAR; INTRAVENOUS at 09:09

## 2024-01-03 RX ADMIN — METHOCARBAMOL 750 MG: 500 TABLET ORAL at 21:13

## 2024-01-03 RX ADMIN — ONDANSETRON HYDROCHLORIDE 4 MG: 4 TABLET, FILM COATED ORAL at 04:08

## 2024-01-03 RX ADMIN — DEXAMETHASONE SODIUM PHOSPHATE 2 MG: 4 INJECTION, SOLUTION INTRAMUSCULAR; INTRAVENOUS at 02:10

## 2024-01-03 RX ADMIN — AMLODIPINE BESYLATE 10 MG: 5 TABLET ORAL at 09:06

## 2024-01-03 RX ADMIN — ASPIRIN 81 MG CHEWABLE TABLET 81 MG: 81 TABLET CHEWABLE at 21:13

## 2024-01-03 RX ADMIN — Medication 10 ML: at 21:14

## 2024-01-03 RX ADMIN — DOCUSATE SODIUM AND SENNOSIDES 2 TABLET: 8.6; 5 TABLET, FILM COATED ORAL at 21:36

## 2024-01-03 RX ADMIN — DEXAMETHASONE SODIUM PHOSPHATE 2 MG: 4 INJECTION, SOLUTION INTRAMUSCULAR; INTRAVENOUS at 09:09

## 2024-01-03 RX ADMIN — METHOCARBAMOL 750 MG: 500 TABLET ORAL at 11:38

## 2024-01-03 RX ADMIN — DEXAMETHASONE SODIUM PHOSPHATE 2 MG: 4 INJECTION, SOLUTION INTRAMUSCULAR; INTRAVENOUS at 17:09

## 2024-01-03 RX ADMIN — ENOXAPARIN SODIUM 40 MG: 100 INJECTION SUBCUTANEOUS at 15:23

## 2024-01-03 RX ADMIN — METHOCARBAMOL 750 MG: 500 TABLET ORAL at 17:09

## 2024-01-03 RX ADMIN — DOCUSATE SODIUM 50 MG AND SENNOSIDES 8.6 MG 2 TABLET: 8.6; 5 TABLET, FILM COATED ORAL at 09:06

## 2024-01-03 RX ADMIN — VANCOMYCIN HYDROCHLORIDE 1250 MG: 1.25 INJECTION, POWDER, LYOPHILIZED, FOR SOLUTION INTRAVENOUS at 17:09

## 2024-01-03 NOTE — PROGRESS NOTES
[facility]ation [facility] Progress Note    Patient Name: Damien Avalos  : 1965  MRN: [id number]  Primary Care Physician: Brian Jones MD  Date of admission: 2024     Patient Care Team:  Brian Jones MD as PCP - General          Subjective   History Present Illness     Chief Complaint:   Chief Complaint   Patient presents with    Leg Pain     Left knee pain started Thursday night, was here on Thursday for the same.  Pt seen at Dr. Kwan office and had knee drained, told to come back to ER if pain persist.       Right knee pain and swelling    History of Present Illness  Mr. Avalos is a 58 y.o.  presents to [facility] complaining of leg pain        History of Present Illness     ED 24: 58 y.o. male presents with right knee pain. Onset of symptoms was Thursday evening and has been progressively worsening despite more conservative treatment measures including ice, rest and elevation.  Symptoms are associated with ability to move, exercise, and perform activities of daily living.  Symptoms are aggravated by weight bearing and ROM necessary for activities of daily living.   Symptoms improve with rest, ice and elevation only minimally. He had an aspiration in the office on Friday with cloudy aspirate noted. It was sent for culture. He was started on antibiotics and steriods on Friday as well. He came back to the hospital to get labs on Saturday , however, there was not a lab order. He presented back to the emergency room this am with continued complaints of pain and swelling.      Observation 24: Patient is a 58-year-old male presenting to the hospital with right knee pain.  Patient states symptoms of body aches chills and increased knee pain started Thursday.  Patient recently had aspiration office with orthopedic physician and noted cloudy aspiration culture sent.  Patient was started on antibiotics and steroids on Friday.  Patient states he is sent to hospital to get labs  but order not present per staff.  Patient reported increased swelling and redness with streaking to right lower extremity.  Denies shortness of breath, chest pain, nausea, vomiting falls or recent injury.     1/3/2024:  Patient reports he feels significantly improved and has increased mobility and right knee.  No other new or acute symptoms are noted.        ROS  Review of Systems   Constitutional: Positive for malaise/fatigue.   HENT: Negative.     Eyes: Negative.    Cardiovascular: Negative.    Respiratory: Negative.     Endocrine: Negative.    Hematologic/Lymphatic: Negative.    Skin: Negative.  Positive for color change.   Musculoskeletal:  Positive for joint pain and joint swelling.   Gastrointestinal: Negative.    Genitourinary: Negative.    Neurological:  Positive for weakness.   Psychiatric/Behavioral: Negative.     Allergic/Immunologic: Negative.        Personal History     Past Medical History:   Past Medical History:   Diagnosis Date    Hypertension        Surgical History:      Past Surgical History:   Procedure Laterality Date    KNEE ARTHROSCOPY Right 1/2/2024    Procedure: KNEE ARTHROSCOPY;  Surgeon: Casey Kwan MD;  Location: [facility];  Service: Orthopedics;  Laterality: Right;    KNEE INCISION AND DRAINAGE Right 1/2/2024    Procedure: KNEE INCISION AND DRAINAGE;  Surgeon: Casey Kwan MD;  Location: [facility];  Service: Orthopedics;  Laterality: Right;    SHOULDER SURGERY      right shoulder-slap tear           Family History: family history includes Heart disease in his mother. Otherwise pertinent FHx was reviewed and unremarkable.     Social History:  reports that he has never smoked. He has never used smokeless tobacco. He reports that he does not drink alcohol and does not use drugs.      Medications:  Prior to Admission medications    Medication Sig Start Date End Date Taking? Authorizing Provider   acetaminophen (TYLENOL) 325 MG tablet Take 2 tablets by mouth  Every 6 (Six) Hours As Needed for Mild Pain.   Yes Pili Gonzalez MD   amLODIPine (NORVASC) 10 MG tablet Take 1 tablet by mouth Daily. 6/23/22  Yes Brian Jones MD   aspirin 81 MG chewable tablet Chew 2 tablets Daily.   Yes Pili Gonzalez MD   benazepril (LOTENSIN) 40 MG tablet Take 1 tablet by mouth Daily. 6/23/22  Yes Brian Jones MD   meloxicam (MOBIC) 15 MG tablet Take 1 tablet by mouth Daily.   Yes Pili Gonzalez MD   methylPREDNISolone (MEDROL) 4 MG dose pack Take  by mouth 2 (Two) Times a Day. Take as directed on package instructions. 12/29/23 1/3/24 Yes Pili Gonzalez MD   Multiple Vitamins-Minerals (MULTIVITAMIN ADULT) tablet Take 1 tablet by mouth Daily.   Yes Pili Gonzalez MD   sulfamethoxazole-trimethoprim (BACTRIM DS,SEPTRA DS) 800-160 MG per tablet Take 1 tablet by mouth 2 (Two) Times a Day. 12/29/23 1/4/24 Yes Pili Gonzalez MD   traMADol (ULTRAM) 50 MG tablet Take 1 tablet by mouth Every 6 (Six) Hours As Needed for Moderate Pain.    ProviderPili MD       Allergies:  No Known Allergies    Objective   Objective     Vital Signs  Temp:  [97.2 °F (36.2 °C)-97.9 °F (36.6 °C)] 97.9 °F (36.6 °C)  Heart Rate:  [60-83] 64  Resp:  [10-16] 16  BP: (111-143)/(64-86) 143/86  SpO2:  [86 %-98 %] 97 %  on  Flow (L/min):  [1-6] 2;   Device (Oxygen Therapy): nasal cannula  Body mass index is 35.12 kg/m².    Physical Exam  Physical Exam  Vitals reviewed.   Constitutional:       General: He is not in acute distress.     Appearance: Normal appearance. He is normal weight. He is not ill-appearing, toxic-appearing or diaphoretic.   HENT:      Head: Normocephalic.      Right Ear: External ear normal.      Left Ear: External ear normal.      Nose: Nose normal.      Mouth/Throat:      Mouth: Mucous membranes are moist.   Eyes:      Extraocular Movements: Extraocular movements intact.   Cardiovascular:      Rate and Rhythm: Normal rate and regular rhythm.       Pulses: Normal pulses.      Heart sounds: Normal heart sounds.   Pulmonary:      Effort: Pulmonary effort is normal.      Breath sounds: Normal breath sounds.   Abdominal:      General: Bowel sounds are normal.      Palpations: Abdomen is soft.      Tenderness: There is no abdominal tenderness.   Musculoskeletal:         General: Normal range of motion.      Cervical back: Normal range of motion.      Right lower leg: No edema.      Left lower leg: No edema.   Skin:     General: Skin is warm and dry.      Capillary Refill: Capillary refill takes less than 2 seconds.   Neurological:      General: No focal deficit present.      Mental Status: He is alert and oriented to person, place, and time.   Psychiatric:         Mood and Affect: Mood normal.         Behavior: Behavior normal.         Thought Content: Thought content normal.         Judgment: Judgment normal.     Results Review:  I have personally reviewed most recent lab results and radiology images and interpretations and agree with findings, most notably: CMP, CBC, CRP, sed rate, blood and wound cultures and x-ray of right knee.    Results from last 7 days   Lab Units 01/03/24  0334   WBC 10*3/mm3 20.40*   HEMOGLOBIN g/dL 14.1   HEMATOCRIT % 41.2   PLATELETS 10*3/mm3 330     Results from last 7 days   Lab Units 01/03/24  0334 01/02/24  0626 01/01/24  0745   SODIUM mmol/L 137   < > 142   POTASSIUM mmol/L 4.9   < > 4.4   CHLORIDE mmol/L 102   < > 106   CO2 mmol/L 26.0   < > 25.0   BUN mg/dL 31*   < > 23*   CREATININE mg/dL 1.01   < > 1.16   GLUCOSE mg/dL 178*   < > 105*   CALCIUM mg/dL 9.5   < > 9.6   PROCALCITONIN ng/mL  --   --  0.09    < > = values in this interval not displayed.     Estimated Creatinine Clearance: 93.6 mL/min (by C-G formula based on SCr of 1.01 mg/dL).  Brief Urine Lab Results       None            Microbiology Results (last 10 days)       Procedure Component Value - Date/Time    Wound Culture - Wound, Knee, Right [[id number]]   (Abnormal) Collected: 01/02/24 1402    Lab Status: Preliminary result Specimen: Wound from Knee, Right Updated: 01/03/24 0830     Wound Culture Light growth (2+) Staphylococcus aureus     Gram Stain Moderate (3+) WBCs per low power field      No organisms seen    Wound Culture - Wound, Knee, Right [[id number]]  (Abnormal) Collected: 01/02/24 1400    Lab Status: Preliminary result Specimen: Wound from Knee, Right Updated: 01/03/24 0829     Wound Culture Heavy growth (4+) Staphylococcus aureus     Gram Stain Moderate (3+) WBCs per low power field      No organisms seen    Blood Culture - Blood, Arm, Right [[id number]]  (Normal) Collected: 01/01/24 0757    Lab Status: Preliminary result Specimen: Blood from Arm, Right Updated: 01/03/24 0815     Blood Culture No growth at 2 days    Blood Culture - Blood, Arm, Left [[id number]]  (Normal) Collected: 01/01/24 0745    Lab Status: Preliminary result Specimen: Blood from Arm, Left Updated: 01/03/24 0800     Blood Culture No growth at 2 days    MRSA Screen, PCR (Inpatient) - Swab, Nares [[id number]]  (Normal) Collected: 01/01/24 0745    Lab Status: Final result Specimen: Swab from Nares Updated: 01/01/24 0907     MRSA PCR No MRSA Detected    Narrative:      The negative predictive value of this diagnostic test is high and should only be used to consider de-escalating anti-MRSA therapy. A positive result may indicate colonization with MRSA and must be correlated clinically.            ECG/EMG Results (most recent)       Procedure Component Value Units Date/Time    SCANNED - TELEMETRY   [[id number]] Resulted: 01/01/24     Updated: 01/02/24 0856    SCANNED - TELEMETRY   [[id number]] Resulted: 01/01/24     Updated: 01/02/24 0856    SCANNED - TELEMETRY   [[id number]] Resulted: 01/01/24     Updated: 01/03/24 0948    SCANNED - TELEMETRY   [[id number]] Resulted: 01/01/24     Updated: 01/03/24 1006    SCANNED - TELEMETRY   [[id number]] Resulted: 01/01/24     Updated: 01/03/24 1236    SCANNED -  TELEMETRY   [[id number]] Resulted: 01/01/24     Updated: 01/03/24 1236    SCANNED - TELEMETRY   [[id number]] Resulted: 01/01/24     Updated: 01/03/24 1245    SCANNED - TELEMETRY   [[id number]] Resulted: 01/01/24     Updated: 01/03/24 1245    SCANNED - TELEMETRY   [[id number]] Resulted: 01/01/24     Updated: 01/03/24 1247            Results for orders placed during the hospital encounter of 12/29/23    Duplex venous lower extremity right CAR    Interpretation Summary    Normal right lower extremity venous duplex scan.          XR Knee 3 View Right    Result Date: 1/1/2024  Impression: 1. No acute osseous abnormality of the right knee. 2. Small suprapatellar joint effusion. Anterior soft tissue swelling. Electronically Signed: Juan Manuel Gorman  1/1/2024 8:33 AM EST  Workstation ID: [id number]       Estimated Creatinine Clearance: 93.6 mL/min (by C-G formula based on SCr of 1.01 mg/dL).    Assessment & Plan   Assessment/Plan       Active Hospital Problems    Diagnosis  POA    **Cellulitis [L03.90]  Yes      Resolved Hospital Problems   No resolved problems to display.     Cellulitis of right knee  -XR right knee: No acute osseous process seen, small suprapatellar joint effusion with anterior soft tissues swelling  -Blood cultures pending  -MRSA negative  -CRP 15.98, sed rate 27  -IV/oral analgesics and antibiotics as needed  -Continue IV Rocephin from ED  -WBC 16.3, Pro-Danyel 0.09, monitor trend  -Continue tramadol, IV Toradol, steroids  -Orthopedic surgery consulted who performed a right knee arthroscopy as well as I&D on 1/2/2023  -ID consulted initiated vancomycin recommend continuing to follow cultures and IV antibiotics for now  -Hospitalist consulted for further management     Hypertension        BP Readings from Last 1 Encounters:   01/01/24 119/69   - Continue aspirin, amlodipine, lisinopril  - Monitor while admitted     Obesity (BMI: 35.12)  -Encouraged on lifestyle modifications            VTE Prophylaxis -    Mechanical Order History:        Ordered        01/01/24 1104  Place Sequential Compression Device  Once            01/01/24 1104  Maintain Sequential Compression Device  Continuous                          Pharmalogical Order History:       None            CODE STATUS:    Code Status and Medical Interventions:   Ordered at: 01/01/24 1045     Level Of Support Discussed With:    Patient     Code Status (Patient has no pulse and is not breathing):    CPR (Attempt to Resuscitate)     Medical Interventions (Patient has pulse or is breathing):    Full Support       This patient has been examined wearing personal protective equipment.     I discussed the patient's findings and my recommendations with patient and nursing staff.      Signature:Electronically signed by Kaushik Greer PA-C, 01/03/24, 1:47 PM EST.

## 2024-01-03 NOTE — PROGRESS NOTES
ORTHO PROGRESS NOTE      Patient: Damien Avalos    Date of Admission: 1/1/2024  7:01 AM    YOB: 1965    Medical Record Number: [id number]    Attending Physician: Brtet Pandey MD        Status post right knee arthroscopy with irrigation debridement and synovctomy      Systemic or Specific Complaints:no complaints, pain much improved      Allergies: No Known Allergies    Medications:   Current Medications:  Scheduled Meds:amLODIPine, 10 mg, Oral, Daily  aspirin, 81 mg, Oral, BID  cefTRIAXone, 2,000 mg, Intravenous, Daily  dexAMETHasone, 2 mg, Intravenous, Q8H  lisinopril, 40 mg, Oral, Q24H  methocarbamol, 750 mg, Oral, 4x Daily  senna-docusate sodium, 2 tablet, Oral, BID  sodium chloride, 10 mL, Intravenous, Q12H  vancomycin, 1,250 mg, Intravenous, Q12H      Continuous Infusions:Pharmacy to dose vancomycin,   sodium chloride, 1,000 mL, Last Rate: 25 mL/hr at 01/03/24 0440      PRN Meds:.  acetaminophen    senna-docusate sodium **AND** polyethylene glycol **AND** bisacodyl **AND** bisacodyl    HYDROcodone-acetaminophen    ketorolac    nitroglycerin    ondansetron **OR** [DISCONTINUED] ondansetron    Pharmacy to dose vancomycin    [COMPLETED] Insert Peripheral IV **AND** sodium chloride    sodium chloride    sodium chloride    traMADol      Physical Exam: 58 y.o. male  General Appearance:                Pain Relief: Patient reports      Vitals:    01/03/24 0400 01/03/24 0500 01/03/24 0549 01/03/24 0600   BP:   136/81    BP Location:   Right arm    Patient Position:   Lying    Pulse: 60 61 62 64   Resp:   16    Temp:   97.6 °F (36.4 °C)    TempSrc:   Oral    SpO2:   97%    Weight:       Height:              HEENT:    Normocephalic, without obvious abnormality, atraumatic.          PERRLA; EOMI; Neck supple with trachea midline. No JVD.    No lymphadenopathy     Lungs:     Clear to auscultation,respirations regular, even and                   Unlabored      Heart:    Regular rhythm and normal rate,  normal S1 and S2, no            murmur, no gallop, no rub, no click     Abdomen:     Normal bowel sounds, no masses, no organomegaly, soft        non-tender, non-distended, no guarding, no rebound                 tenderness       Extremities:   Operative extremity neurovascular status intact. ROM intact.    Incision intact w/out signs or  symptoms of infection. No           edema, no cyanosis, no calf tenderness  No sign or symptom of dvt/pe     Pulses:     Pulses palpable and equal bilaterally     Skin:     Skin Warm/Dry w/out ulceration, ecchymosis, rash, or   cyanosis     Activity: Mobilizing Per P.T.   Weight Bearing: As Tolerated    Diagnostic Tests:   [unfilled]      [unfilled]    Assessment:  Status post surgery  Post-operative Pain  Immobility    Plan:    Continue efforts to mobilize  Continue Pain Control Measures  Awaiting on ID recommendations for discharge antibiotics        Discharge Plan: continue to work on discharge    Date: [unfilled] Time: 09:07 JAZ Zuniga

## 2024-01-03 NOTE — CASE MANAGEMENT/SOCIAL WORK
Continued Stay Note  CAROLYNN Mendenhall     Patient Name: Damien Avalos  MRN: [id number]  Today's Date: 1/3/2024    Admit Date: 1/1/2024    Plan: Return home   Discharge Plan       Row Name 01/03/24 0900       Plan    Plan Return home    Plan Comments DC barriers: patient had knee arthroscopy yesterday, patient did well overnight, not requiring any IV pain medication. Anticipate patient to DC today.             Alfonso Randall RN     Cell number [phone]  Office number [phone]

## 2024-01-03 NOTE — PAYOR COMM NOTE
"OBSERVATION TO INPATIENT CASE    01/03/24 1413  Inpatient Admission  Once     Completed     Level of Care: Med/Surg  Diagnosis: Cellulitis [421315]  Admitting Physician: SONJA MANN [[id number]]  Attending Physician: SONJA MANN [[id number]]  Certification: I Certify That Inpatient Hospital Services Are Medically Necessary For Greater Than 2 Midnights    01/03/24 1421   01/01/24 0946  Initiate ED Observation Status  Once     Completed     Level of Care: Observation Unit  Diagnosis: Cellulitis [314954]  Admitting Physician: ANNIKA MESSER [[id number]]  Attending Physician: ANNIKA MESSER [[id number]]         Yoselin SERVIN, RN    Care Coordination   [place] [address]    [phone] office  [phone] fax  [email or web address]  [email or web address]        Damien Avalos (58 y.o. Male)       Date of Birth   1965    Social Security Number       Address   [address] [place] [address] [place] [address]    Home Phone   [phone]    MRN   [id number]       [employer]ybeterian   Taoism    Marital Status                               Admission Date   1/1/24    Admission Type   Emergency    Admitting Provider   Sonja Mann MD    Attending Provider   Sonja Mann MD    Department, Room/Bed   [facility] OBSERVATION, 103/1       Discharge Date       Discharge Disposition       Discharge Destination                                 Attending Provider: Sonja Mann MD    Allergies: No Known Allergies    Isolation: None   Infection: None   Code Status: CPR    Ht: 172.7 cm (68\")   Wt: 105 kg (231 lb)    Admission Cmt: None   Principal Problem: Cellulitis [L03.90]                   Active Insurance as of 1/1/2024       Primary Coverage       Payor Plan Insurance Group Employer/Plan Group    [employer] PPO [id number]       Payor Plan Address Payor Plan Phone Number Payor Plan Fax Number Effective Dates    [address]   " 2023 - None Entered    [facility] [id number]         Subscriber Name Subscriber Birth Date Member ID       MADAY AVALOS 1965 [id number]                     Emergency Contacts        (Rel.) Home Phone Work Phone Mobile Phone    MADAY AVALOS (Spouse) [phone] -- --                 History & Physical        Trang Mann MD at 24 1412           [facility]   HISTORY AND PHYSICAL    Patient Name: Damien Avalos  : 1965  MRN: [id number]  Primary Care Physician:  Brian Jones MD  Date of admission: 2024    Subjective  Subjective     Chief Complaint: Lrknee pain started Thursday    HPI:    Damien Avalos is a 58 y.o. male 58-year-old gentleman presented to the hospital with lrknee pain since Thursday evening progressively getting worse despite conservative treatment including rest and elevation  Pain worsening with range of motion  He did have some bodyaches and chills  Patient had aspiration of his left knee by orthopedic and was found to have cloudy aspiration culture  Patient was started on antibiotic and steroids on Friday and was sent to the hospital for increasing pain and swelling  Patient has been followed up per infectious disease and is on IV antibiotic    Review of Systems   Negative and for left knee pain  Generalized body aches  Chills  Most of the symptoms are resolved at this point rest of 14 system reviewed and negative    Personal History     Past Medical History:   Diagnosis Date    Hypertension        Past Surgical History:   Procedure Laterality Date    KNEE ARTHROSCOPY Right 2024    Procedure: KNEE ARTHROSCOPY;  Surgeon: Casey Kwan MD;  Location: [facility] MAIN OR;  Service: Orthopedics;  Laterality: Right;    KNEE INCISION AND DRAINAGE Right 2024    Procedure: KNEE INCISION AND DRAINAGE;  Surgeon: Casey Kwan MD;  Location: [facility] MAIN OR;  Service: Orthopedics;  Laterality: Right;    SHOULDER SURGERY       right shoulder-slap tear       Family History: family history includes Heart disease in his mother. Otherwise pertinent FHx was reviewed and not pertinent to current issue.    Social History:  reports that he has never smoked. He has never used smokeless tobacco. He reports that he does not drink alcohol and does not use drugs.    Home Medications:  acetaminophen, amLODIPine, aspirin, benazepril, meloxicam, methylPREDNISolone, multivitamin with minerals, sulfamethoxazole-trimethoprim, and traMADol    Allergies:  No Known Allergies    Objective  Objective     Vitals:   Temp:  [97.2 °F (36.2 °C)-97.9 °F (36.6 °C)] 97.9 °F (36.6 °C)  Heart Rate:  [60-83] 64  Resp:  [10-16] 16  BP: (111-143)/(64-86) 143/86  Flow (L/min):  [1-6] 2  Physical Exam    Constitutional: Awake, alert   Eyes: PERRLA, sclerae anicteric, no conjunctival injection   HENT: NCAT, mucous membranes moist   Neck: Supple, no thyromegaly, no lymphadenopathy, trachea midline   Respiratory: Clear to auscultation bilaterally, nonlabored respirations    Cardiovascular: RRR, no murmurs, rubs, or gallops, palpable pedal pulses bilaterally   Gastrointestinal: Positive bowel sounds, soft, nontender, nondistended   Musculoskeletal: No bilateral ankle edema, no clubbing or cyanosis to extremities   Psychiatric: Appropriate affect, cooperative   Neurologic: Oriented x 3, strength symmetric in all extremities, Cranial Nerves grossly intact to confrontation, speech clear   Skin: No rashes     Result Review   Result Review:  I have personally reviewed the results from the time of this admission to 1/3/2024 14:13 EST and agree with these findings:  [x]  Laboratory list / accordion  []  Microbiology  []  Radiology  []  EKG/Telemetry   []  Cardiology/Vascular   []  Pathology  []  Old records  []  Other:  Most notable findings include:       Assessment & Plan  Assessment / Plan     Brief Patient Summary:  Damien Avalos is a 58 y.o. male who is admitted with [facility]  "pain  Patient status post right knee arthroscopy and irrigation debridement and synovectomy    Patient has been followed up per infectious disease and orthopedic  Active Hospital Problems:  Active Hospital Problems    Diagnosis     **Cellulitis      Plan:   Patient with right knee infection  He is immunocompetent  Status post I&D and washout on January 2  Intraoperative cultures are pending  Patient status post right knee arthroscopy on December 2 due to meniscal tear  Continue IV ceftriaxone 2 g IV every 24 hours  Start IV vancomycin  Awaiting on intraoperative culture  Follow-up per ID and orthopedic    DVT prophylaxis:  Medical and mechanical DVT prophylaxis orders are present.    CODE STATUS:    Code Status (Patient has no pulse and is not breathing): CPR (Attempt to Resuscitate)  Medical Interventions (Patient has pulse or is breathing): Full Support    Admission Status:  I believe this patient meets ip status.    Trang Mann MD             Electronically signed by Trang Mann MD at 01/03/24 1602       Casey Kwan MD at 01/02/24 8348             History & Physical       Patient: Damien Avalos    Proposed Surgery and date: Procedure(s) (LRB):  KNEE ARTHROSCOPY (Right)  KNEE INCISION AND DRAINAGE (Right)     YOB: 1965    Medical Record Number: [id number]  Wt Readings from Last 3 Encounters:   01/01/24 105 kg (231 lb)   06/23/22 97.3 kg (214 lb 6.4 oz)   06/13/22 98.2 kg (216 lb 9.6 oz)     Ht Readings from Last 3 Encounters:   01/01/24 172.7 cm (68\")   06/23/22 172.7 cm (68\")   06/13/22 172.7 cm (68\")     Body mass index is 35.12 kg/m².  Facility age limit for growth %isac is 20 years.      Surgeon:  Dr. Casey Kwan M.D.        Chief Complaints: Pain    History of Present Illness: 58 y.o. male presents with right knee pain and swelling status postarthroscopy on the 22nd.  He states he was doing pretty well after surgery with just minimal pain started having some pain and " swelling on Thursday.  Denies fevers or chills.     Allergies: No Known Allergies    Medications:   Home Medications:  No current facility-administered medications on file prior to encounter.     Current Outpatient Medications on File Prior to Encounter   Medication Sig    acetaminophen (TYLENOL) 325 MG tablet Take 2 tablets by mouth Every 6 (Six) Hours As Needed for Mild Pain.    amLODIPine (NORVASC) 10 MG tablet Take 1 tablet by mouth Daily.    aspirin 81 MG chewable tablet Chew 2 tablets Daily.    benazepril (LOTENSIN) 40 MG tablet Take 1 tablet by mouth Daily.    meloxicam (MOBIC) 15 MG tablet Take 1 tablet by mouth Daily.    methylPREDNISolone (MEDROL) 4 MG dose pack Take  by mouth 2 (Two) Times a Day. Take as directed on package instructions.    Multiple Vitamins-Minerals (MULTIVITAMIN ADULT) tablet Take 1 tablet by mouth Daily.    sulfamethoxazole-trimethoprim (BACTRIM DS,SEPTRA DS) 800-160 MG per tablet Take 1 tablet by mouth 2 (Two) Times a Day.    traMADol (ULTRAM) 50 MG tablet Take 1 tablet by mouth Every 6 (Six) Hours As Needed for Moderate Pain.     Current Medications:  Scheduled Meds:amLODIPine, 10 mg, Oral, Daily  [MAR Hold] aspirin, 162 mg, Oral, Daily  cefTRIAXone, 2,000 mg, Intravenous, Daily  [MAR Hold] dexAMETHasone, 2 mg, Intravenous, Q8H  lisinopril, 40 mg, Oral, Q24H  [MAR Hold] methocarbamol, 750 mg, Oral, 4x Daily  [MAR Hold] senna-docusate sodium, 2 tablet, Oral, BID  [MAR Hold] sodium chloride, 10 mL, Intravenous, Q12H      Continuous Infusions:   PRN Meds:.  [MAR Hold] acetaminophen    [MAR Hold] senna-docusate sodium **AND** [MAR Hold] polyethylene glycol **AND** [MAR Hold] bisacodyl **AND** [MAR Hold] bisacodyl    [MAR Hold] ketorolac    [MAR Hold] nitroglycerin    [MAR Hold] ondansetron **OR** [MAR Hold] ondansetron    [COMPLETED] Insert Peripheral IV **AND** [MAR Hold] sodium chloride    [MAR Hold] sodium chloride    [MAR Hold] sodium chloride    [MAR Hold] traMADol    Past Medical  History:   Diagnosis Date    Hypertension         Past Surgical History:   Procedure Laterality Date    SHOULDER SURGERY      right shoulder-slap tear        Social History     Occupational History    Not on file   Tobacco Use    Smoking status: Never    Smokeless tobacco: Never   Vaping Use    Vaping Use: Never used   Substance and Sexual Activity    Alcohol use: Never    Drug use: Never    Sexual activity: Defer      Social History     Social History Narrative    Not on file        Family History   Problem Relation Age of Onset    Heart disease Mother          Review of Systems: 14 point review of systems was performed and was negative except as documented in the history of present illness.      Physical Exam: 58 y.o. male    Vital signs reviewed.    General Appearance:    Alert, cooperative, in no acute distress                  General: alert, oriented  Eyes: conjunctiva clear  ENT: external ears and nose atraumatic  CV: no peripheral edema  Resp: normal respiratory effort  Skin: no rashes or wounds; normal turgor, no erythema today  Psych: mood and affect appropriate  Lymph: no nodes appreciated  Neuro: gross sensation intact  Vascular:  Palpable peripheral pulse in noted extremity  Musculoskeletal Extremities:  tenderness over operative extremity. Moves all extremities well, no to minimal LE edema, no cyanosis, no redness  Some swelling in the right knee noted pain with range of motion            Diagnostic Tests:  Lab Results (last 7 days)       Procedure Component Value Units Date/Time    Blood Culture - Blood, Arm, Right [[id number]]  (Normal) Collected: 01/01/24 0757    Specimen: Blood from Arm, Right Updated: 01/02/24 0815     Blood Culture No growth at 24 hours    Blood Culture - Blood, Arm, Left [[id number]]  (Normal) Collected: 01/01/24 0745    Specimen: Blood from Arm, Left Updated: 01/02/24 0801     Blood Culture No growth at 24 hours    Basic Metabolic Panel [[id number]]  (Abnormal) Collected: 01/02/24  0626    Specimen: Blood from Arm, Left Updated: 01/02/24 0709     Glucose 124 mg/dL      BUN 23 mg/dL      Creatinine 0.88 mg/dL      Sodium 134 mmol/L      Potassium 4.5 mmol/L      Comment: Slight hemolysis detected by analyzer. Result may be falsely elevated.        Chloride 103 mmol/L      CO2 24.0 mmol/L      Calcium 9.1 mg/dL      BUN/Creatinine Ratio 26.1     Anion Gap 7.0 mmol/L      eGFR 99.7 mL/min/1.73     Narrative:      GFR Normal >60  Chronic Kidney Disease <60  Kidney Failure <15      CBC & Differential [[id number]]  (Abnormal) Collected: 01/02/24 0432    Specimen: Blood from Arm, Left Updated: 01/02/24 0451    Narrative:      The following orders were created for panel order CBC & Differential.  Procedure                               Abnormality         Status                     ---------                               -----------         ------                     CBC Auto Differential[[id number]]        Abnormal            Final result                 Please view results for these tests on the individual orders.    CBC Auto Differential [[id number]]  (Abnormal) Collected: 01/02/24 0432    Specimen: Blood from Arm, Left Updated: 01/02/24 0451     WBC 18.00 10*3/mm3      RBC 4.65 10*6/mm3      Hemoglobin 14.5 g/dL      Hematocrit 42.6 %      MCV 91.7 fL      MCH 31.2 pg      MCHC 34.0 g/dL      RDW 13.3 %      RDW-SD 42.0 fl      MPV 8.1 fL      Platelets 306 10*3/mm3      Neutrophil % 86.7 %      Lymphocyte % 8.2 %      Monocyte % 4.7 %      Eosinophil % 0.0 %      Basophil % 0.4 %      Neutrophils, Absolute 15.60 10*3/mm3      Lymphocytes, Absolute 1.50 10*3/mm3      Monocytes, Absolute 0.90 10*3/mm3      Eosinophils, Absolute 0.00 10*3/mm3      Basophils, Absolute 0.10 10*3/mm3      nRBC 0.0 /100 WBC     Procalcitonin [[id number]]  (Normal) Collected: 01/01/24 0745    Specimen: Blood Updated: 01/01/24 1303     Procalcitonin 0.09 ng/mL     Narrative:      As a Marker for Sepsis (Non-Neonates):    1.  "<0.5 ng/mL represents a low risk of severe sepsis and/or septic shock.  2. >2 ng/mL represents a high risk of severe sepsis and/or septic shock.    As a Marker for Lower Respiratory Tract Infections that require antibiotic therapy:    PCT on Admission    Antibiotic Therapy       6-12 Hrs later    >0.5                Strongly Recommended  >0.25 - <0.5        Recommended   0.1 - 0.25          Discouraged              Remeasure/reassess PCT  <0.1                Strongly Discouraged     Remeasure/reassess PCT    As 28 day mortality risk marker: \"Change in Procalcitonin Result\" (>80% or <=80%) if Day 0 (or Day 1) and Day 4 values are available. Refer to http://www.[facility]il[facility]ct[facility]calculator.com    Change in PCT <=80%  A decrease of PCT levels below or equal to 80% defines a positive change in PCT test result representing a higher risk for 28-day all-cause mortality of patients diagnosed with severe sepsis for septic shock.    Change in PCT >80%  A decrease of PCT levels of more than 80% defines a negative change in PCT result representing a lower risk for 28-day all-cause mortality of patients diagnosed with severe sepsis or septic shock.       MRSA Screen, PCR (Inpatient) - Swab, Nares [[id number]]  (Normal) Collected: 01/01/24 0745    Specimen: Swab from Nares Updated: 01/01/24 0907     MRSA PCR No MRSA Detected    Narrative:      The negative predictive value of this diagnostic test is high and should only be used to consider de-escalating anti-MRSA therapy. A positive result may indicate colonization with MRSA and must be correlated clinically.    Sedimentation Rate [[id number]]  (Abnormal) Collected: 01/01/24 0745    Specimen: Blood Updated: 01/01/24 0819     Sed Rate 27 mm/hr     Basic Metabolic Panel [[id number]]  (Abnormal) Collected: 01/01/24 0745    Specimen: Blood Updated: 01/01/24 0810     Glucose 105 mg/dL      BUN 23 mg/dL      Creatinine 1.16 mg/dL      Sodium 142 mmol/L      Potassium 4.4 mmol/L      Chloride 106 " mmol/L      CO2 25.0 mmol/L      Calcium 9.6 mg/dL      BUN/Creatinine Ratio 19.8     Anion Gap 11.0 mmol/L      eGFR 73.0 mL/min/1.73     Narrative:      GFR Normal >60  Chronic Kidney Disease <60  Kidney Failure <15      C-reactive Protein [[id number]]  (Abnormal) Collected: 01/01/24 0745    Specimen: Blood Updated: 01/01/24 0810     C-Reactive Protein 15.98 mg/dL     CBC & Differential [[id number]]  (Abnormal) Collected: 01/01/24 0745    Specimen: Blood Updated: 01/01/24 0751    Narrative:      The following orders were created for panel order CBC & Differential.  Procedure                               Abnormality         Status                     ---------                               -----------         ------                     CBC Auto Differential[[id number]]        Abnormal            Final result                 Please view results for these tests on the individual orders.    CBC Auto Differential [[id number]]  (Abnormal) Collected: 01/01/24 0745    Specimen: Blood Updated: 01/01/24 0751     WBC 16.30 10*3/mm3      RBC 4.67 10*6/mm3      Hemoglobin 14.5 g/dL      Hematocrit 42.4 %      MCV 90.7 fL      MCH 31.0 pg      MCHC 34.2 g/dL      RDW 13.7 %      RDW-SD 43.3 fl      MPV 7.8 fL      Platelets 259 10*3/mm3      Neutrophil % 79.6 %      Lymphocyte % 11.7 %      Monocyte % 8.2 %      Eosinophil % 0.0 %      Basophil % 0.5 %      Neutrophils, Absolute 12.90 10*3/mm3      Lymphocytes, Absolute 1.90 10*3/mm3      Monocytes, Absolute 1.30 10*3/mm3      Eosinophils, Absolute 0.00 10*3/mm3      Basophils, Absolute 0.10 10*3/mm3      nRBC 0.0 /100 WBC             Radiology images/reports reviewed      Assessment: Suspected right knee septic arthritis    Plan: At this point given the presentation including swelling, pain with motion, elevated white count with a left shift, elevated CRP and sed rate he is indicated for surgical intervention.  Will plan to perform arthroscopy with thorough debridement and  "irrigation with tissue sample.  We talked about the pros and cons of open versus arthroscopic debridement.  We will plan on proceeding with surgery at patient's request. Dr. Kwan has reviewed details of procedure with patient today and discussed risks, benefits, alternatives, and limitations of the procedure in laymen's terms with the risks including but not limited to: Allergy to medication, need for future debridements,  need to stop the surgery early or change to a different procedure, stiffness requiring revision surgeries neurovascular damage, foot drop, bleeding, infection, chronic pain, worsening of pain, chondrolysis, recurrent problem,  swelling, loss of motion, weakness, stiffness, instability, DVT, pulmonary embolus, death, stroke, complex regional pain syndrome, and need for additional procedures.  No guarantees were given regarding results of surgery.   We will consult infectious disease.    Patient was given the opportunity to ask and have all questions answered today.  The patient voiced understanding of the risks, benefits, and alternative forms of treatment that were discussed and the patient consents to proceed with surgery.     Discharge Plan: Home with f/u in with Dr. Kwan  Date: 1/2/2024  Casey Kwan MD     Electronically signed by Casey Kwan MD at 01/02/24 1229       Edwina Tristan, JAZ at 01/01/24 1327       Attestation signed by Casey Kwan MD at 01/02/24 1449    I have reviewed this documentation and agree.                     History & Physical       Patient: Damien Avalos    Proposed Surgery and date:      YOB: 1965    Medical Record Number: [id number]  Wt Readings from Last 3 Encounters:   01/01/24 105 kg (231 lb)   06/23/22 97.3 kg (214 lb 6.4 oz)   06/13/22 98.2 kg (216 lb 9.6 oz)     Ht Readings from Last 3 Encounters:   01/01/24 172.7 cm (68\")   06/23/22 172.7 cm (68\")   06/13/22 172.7 cm (68\")     Body mass index is 35.12 " kg/m².  Facility age limit for growth %isac is 20 years.      Surgeon:  Dr. Casey Kwan M.D.        Chief Complaints: Pain of right knee    History of Present Illness: 58 y.o. male presents with right knee pain. Onset of symptoms was Thursday evening and has been progressively worsening despite more conservative treatment measures including ice, rest and elevation.  Symptoms are associated with ability to move, exercise, and perform activities of daily living.  Symptoms are aggravated by weight bearing and ROM necessary for activities of daily living.   Symptoms improve with rest, ice and elevation only minimally. He had an aspiration in the office on Friday with cloudy aspirate noted. It was sent for culture. He was started on antibiotics and steriods on Friday as well. He came back to the hospital to get labs on Saturday , however, there was not a lab order. He presented back to the emergency room this am with continued complaints of pain and swelling.    Allergies: No Known Allergies    Medications:   Home Medications:  No current facility-administered medications on file prior to encounter.     Current Outpatient Medications on File Prior to Encounter   Medication Sig    acetaminophen (TYLENOL) 325 MG tablet Take 2 tablets by mouth Every 6 (Six) Hours As Needed for Mild Pain.    amLODIPine (NORVASC) 10 MG tablet Take 1 tablet by mouth Daily.    aspirin 81 MG chewable tablet Chew 2 tablets Daily.    benazepril (LOTENSIN) 40 MG tablet Take 1 tablet by mouth Daily.    meloxicam (MOBIC) 15 MG tablet Take 1 tablet by mouth Daily.    methylPREDNISolone (MEDROL) 4 MG dose pack Take  by mouth 2 (Two) Times a Day. Take as directed on package instructions.    Multiple Vitamins-Minerals (MULTIVITAMIN ADULT) tablet Take 1 tablet by mouth Daily.    sulfamethoxazole-trimethoprim (BACTRIM DS,SEPTRA DS) 800-160 MG per tablet Take 1 tablet by mouth 2 (Two) Times a Day.    traMADol (ULTRAM) 50 MG tablet Take 1 tablet by mouth  Every 6 (Six) Hours As Needed for Moderate Pain.     Current Medications:  Scheduled Meds:[START ON 1/2/2024] amLODIPine, 10 mg, Oral, Daily  [START ON 1/2/2024] aspirin, 162 mg, Oral, Daily  [START ON 1/2/2024] cefTRIAXone, 2,000 mg, Intravenous, Daily  [START ON 1/2/2024] lisinopril, 40 mg, Oral, Q24H  senna-docusate sodium, 2 tablet, Oral, BID  sodium chloride, 10 mL, Intravenous, Q12H      Continuous Infusions:   PRN Meds:.  acetaminophen    senna-docusate sodium **AND** polyethylene glycol **AND** bisacodyl **AND** bisacodyl    ketorolac    nitroglycerin    ondansetron **OR** ondansetron    [COMPLETED] Insert Peripheral IV **AND** sodium chloride    sodium chloride    sodium chloride    traMADol    Past Medical History:   Diagnosis Date    Hypertension         Past Surgical History:   Procedure Laterality Date    SHOULDER SURGERY      right shoulder-slap tear        Social History     Occupational History    Not on file   Tobacco Use    Smoking status: Never    Smokeless tobacco: Never   Vaping Use    Vaping Use: Never used   Substance and Sexual Activity    Alcohol use: Never    Drug use: Never    Sexual activity: Defer      Social History     Social History Narrative    Not on file        Family History   Problem Relation Age of Onset    Heart disease Mother          Review of Systems: 14 point review of systems was performed and was negative except as documented in the history of present illness.      Physical Exam: 58 y.o. male    Vital signs reviewed.    General Appearance:    Alert, cooperative, in no acute distress                  General: alert, oriented  Eyes: conjunctiva clear  ENT: external ears and nose atraumatic  CV: no peripheral edema  Resp: normal respiratory effort  Skin: no rashes or wounds; normal turgor  Psych: mood and affect appropriate  Lymph: no nodes appreciated  Neuro: gross sensation intact  Vascular:  Palpable peripheral pulse in noted extremity  Musculoskeletal Extremities:   "tenderness over operative extremity. Moves all extremities well, no to minimal LE edema, no cyanosis, no redness            Diagnostic Tests:  Lab Results (last 7 days)       Procedure Component Value Units Date/Time    Procalcitonin [[id number]]  (Normal) Collected: 01/01/24 0745    Specimen: Blood Updated: 01/01/24 1303     Procalcitonin 0.09 ng/mL     Narrative:      As a Marker for Sepsis (Non-Neonates):    1. <0.5 ng/mL represents a low risk of severe sepsis and/or septic shock.  2. >2 ng/mL represents a high risk of severe sepsis and/or septic shock.    As a Marker for Lower Respiratory Tract Infections that require antibiotic therapy:    PCT on Admission    Antibiotic Therapy       6-12 Hrs later    >0.5                Strongly Recommended  >0.25 - <0.5        Recommended   0.1 - 0.25          Discouraged              Remeasure/reassess PCT  <0.1                Strongly Discouraged     Remeasure/reassess PCT    As 28 day mortality risk marker: \"Change in Procalcitonin Result\" (>80% or <=80%) if Day 0 (or Day 1) and Day 4 values are available. Refer to http://www.[employer])s-pct-calculator.com    Change in PCT <=80%  A decrease of PCT levels below or equal to 80% defines a positive change in PCT test result representing a higher risk for 28-day all-cause mortality of patients diagnosed with severe sepsis for septic shock.    Change in PCT >80%  A decrease of PCT levels of more than 80% defines a negative change in PCT result representing a lower risk for 28-day all-cause mortality of patients diagnosed with severe sepsis or septic shock.       MRSA Screen, PCR (Inpatient) - Swab, Nares [[id number]]  (Normal) Collected: 01/01/24 0745    Specimen: Swab from Nares Updated: 01/01/24 0907     MRSA PCR No MRSA Detected    Narrative:      The negative predictive value of this diagnostic test is high and should only be used to consider de-escalating anti-MRSA therapy. A positive result may indicate colonization with MRSA and " must be correlated clinically.    Sedimentation Rate [[id number]]  (Abnormal) Collected: 01/01/24 0745    Specimen: Blood Updated: 01/01/24 0819     Sed Rate 27 mm/hr     Basic Metabolic Panel [[id number]]  (Abnormal) Collected: 01/01/24 0745    Specimen: Blood Updated: 01/01/24 0810     Glucose 105 mg/dL      BUN 23 mg/dL      Creatinine 1.16 mg/dL      Sodium 142 mmol/L      Potassium 4.4 mmol/L      Chloride 106 mmol/L      CO2 25.0 mmol/L      Calcium 9.6 mg/dL      BUN/Creatinine Ratio 19.8     Anion Gap 11.0 mmol/L      eGFR 73.0 mL/min/1.73     Narrative:      GFR Normal >60  Chronic Kidney Disease <60  Kidney Failure <15      C-reactive Protein [[id number]]  (Abnormal) Collected: 01/01/24 0745    Specimen: Blood Updated: 01/01/24 0810     C-Reactive Protein 15.98 mg/dL     Blood Culture - Blood, Arm, Right [[id number]] Collected: 01/01/24 0757    Specimen: Blood from Arm, Right Updated: 01/01/24 0801    CBC & Differential [[id number]]  (Abnormal) Collected: 01/01/24 0745    Specimen: Blood Updated: 01/01/24 0751    Narrative:      The following orders were created for panel order CBC & Differential.  Procedure                               Abnormality         Status                     ---------                               -----------         ------                     CBC Auto Differential[[id number]]        Abnormal            Final result                 Please view results for these tests on the individual orders.    CBC Auto Differential [[id number]]  (Abnormal) Collected: 01/01/24 0745    Specimen: Blood Updated: 01/01/24 0751     WBC 16.30 10*3/mm3      RBC 4.67 10*6/mm3      Hemoglobin 14.5 g/dL      Hematocrit 42.4 %      MCV 90.7 fL      MCH 31.0 pg      MCHC 34.2 g/dL      RDW 13.7 %      RDW-SD 43.3 fl      MPV 7.8 fL      Platelets 259 10*3/mm3      Neutrophil % 79.6 %      Lymphocyte % 11.7 %      Monocyte % 8.2 %      Eosinophil % 0.0 %      Basophil % 0.5 %      Neutrophils, Absolute 12.90  10*3/mm3      Lymphocytes, Absolute 1.90 10*3/mm3      Monocytes, Absolute 1.30 10*3/mm3      Eosinophils, Absolute 0.00 10*3/mm3      Basophils, Absolute 0.10 10*3/mm3      nRBC 0.0 /100 WBC     Blood Culture - Blood, Arm, Left [[id number]] Collected: 24    Specimen: Blood from Arm, Left Updated: 24            Radiology images/reports reviewed      Assessment: Right knee infection    Plan:  We will plan on sending the patient to interventional radiology to tomorrow morning for aspiration with cultures to be sent. He will continue on antibiotics.The patient will be taken to back to the operating room tomorrow for an I & D procedure. We reviewed details of procedure with patient today and discussed risks, benefits, alternatives, and limitations of the procedure in laymen's terms with the risks including but not limited to:  neurovascular damage, bleeding, infection, chronic pain, worsening of pain, chondrolysis, recurrent problem,  swelling, loss of motion, weakness, stiffness, instability, DVT, pulmonary embolus, death, stroke, complex regional pain syndrome, and need for additional procedures.  No guarantees were given regarding results of surgery.     Patient was given the opportunity to ask and have all questions answered today.  The patient voiced understanding of the risks, benefits, and alternative forms of treatment that were discussed and the patient consents to proceed with surgery.     Discharge Plan: Home with f/u in with Dr. Kwan  Date: 2024  JAZ Riley      Electronically signed by Casey Kwan MD at 24 1449       Sue Luna APRN at 24 1236          [facility] Observation Unit H&P    Patient Name: Damien Avalos  : 1965  MRN: [id number]  Primary Care Physician: Brian Jones MD  Date of admission: 2024     Patient Care Team:  Brian Jones MD as PCP - General          Subjective  History Present Illness      Chief Complaint:   Chief Complaint   Patient presents with    Leg Pain     Left knee pain started Thursday night, was here on Thursday for the same.  Pt seen at Dr. Kwan office and had knee drained, told to come back to ER if pain persist.       Leg Pain     Mr. Avalos is a 58 y.o.  presents to [facility] complaining of leg pain      History of Present Illness    ED 1/1/24: 58 y.o. male presents with right knee pain. Onset of symptoms was Thursday evening and has been progressively worsening despite more conservative treatment measures including ice, rest and elevation.  Symptoms are associated with ability to move, exercise, and perform activities of daily living.  Symptoms are aggravated by weight bearing and ROM necessary for activities of daily living.   Symptoms improve with rest, ice and elevation only minimally. He had an aspiration in the office on Friday with cloudy aspirate noted. It was sent for culture. He was started on antibiotics and steriods on Friday as well. He came back to the hospital to get labs on Saturday , however, there was not a lab order. He presented back to the emergency room this am with continued complaints of pain and swelling.     Observation 1/1/24: Patient is a 58-year-old male presenting to the hospital with right knee pain.  Patient states symptoms of body aches chills and increased knee pain started Thursday.  Patient recently had aspiration office with orthopedic physician and noted cloudy aspiration culture sent.  Patient was started on antibiotics and steroids on Friday.  Patient states he is sent to hospital to get labs but order not present per staff.  Patient reported increased swelling and redness with streaking to right lower extremity.  Denies shortness of breath, chest pain, nausea, vomiting falls or recent injury.    Review of Systems   Constitutional: Positive for malaise/fatigue.   HENT: Negative.     Eyes: Negative.    Cardiovascular: Negative.     Respiratory: Negative.     Endocrine: Negative.    Hematologic/Lymphatic: Negative.    Skin: Negative.  Positive for color change.   Musculoskeletal:  Positive for joint pain and joint swelling.   Gastrointestinal: Negative.    Genitourinary: Negative.    Neurological:  Positive for weakness.   Psychiatric/Behavioral: Negative.     Allergic/Immunologic: Negative.            Personal History     Past Medical History:   Past Medical History:   Diagnosis Date    Hypertension        Surgical History:      Past Surgical History:   Procedure Laterality Date    SHOULDER SURGERY      right shoulder-slap tear           Family History: family history includes Heart disease in his mother. Otherwise pertinent FHx was reviewed and unremarkable.     Social History:  reports that he has never smoked. He has never used smokeless tobacco. He reports that he does not drink alcohol and does not use drugs.      Medications:  Prior to Admission medications    Medication Sig Start Date End Date Taking? Authorizing Provider   acetaminophen (TYLENOL) 325 MG tablet Take 2 tablets by mouth Every 6 (Six) Hours As Needed for Mild Pain.   Yes Pili Gonzalez MD   amLODIPine (NORVASC) 10 MG tablet Take 1 tablet by mouth Daily. 6/23/22  Yes Brian Jones MD   aspirin 81 MG chewable tablet Chew 2 tablets Daily.   Yes Pili Gonzalez MD   benazepril (LOTENSIN) 40 MG tablet Take 1 tablet by mouth Daily. 6/23/22  Yes Brian Jones MD   meloxicam (MOBIC) 15 MG tablet Take 1 tablet by mouth Daily.   Yes Pili Gonzalez MD   methylPREDNISolone (MEDROL) 4 MG dose pack Take  by mouth 2 (Two) Times a Day. Take as directed on package instructions. 12/29/23 1/3/24 Yes Pili Gonzalez MD   Multiple Vitamins-Minerals (MULTIVITAMIN ADULT) tablet Take 1 tablet by mouth Daily.   Yes Pili Gonzalez MD   sulfamethoxazole-trimethoprim (BACTRIM DS,SEPTRA DS) 800-160 MG per tablet Take 1 tablet by mouth 2 (Two)  Times a Day. 12/29/23 1/4/24 Yes Provider, MD Pili   traMADol (ULTRAM) 50 MG tablet Take 1 tablet by mouth Every 6 (Six) Hours As Needed for Moderate Pain.    Provider, MD Pili       Allergies:  No Known Allergies    Objective  Objective     Vital Signs  Temp:  [98.2 °F (36.8 °C)-98.3 °F (36.8 °C)] 98.3 °F (36.8 °C)  Heart Rate:  [72-82] 72  Resp:  [15-20] 15  BP: (126-150)/(77-85) 132/77  SpO2:  [94 %-98 %] 94 %  on   ;   Device (Oxygen Therapy): room air  Body mass index is 35.12 kg/m².    Physical Exam  Vitals and nursing note reviewed.   Constitutional:       Appearance: Normal appearance.   Cardiovascular:      Rate and Rhythm: Normal rate.   Musculoskeletal:         General: Swelling and tenderness present.      Cervical back: Normal range of motion.   Skin:     Findings: Erythema present.   Neurological:      Mental Status: He is alert.           Results Review:  I have personally reviewed most recent cardiac tracings, lab results, microbiology results, and radiology images and interpretations and agree with findings, most notably: CBC, CMP, blood cultures, x-ray.    Results from last 7 days   Lab Units 01/01/24  0745   WBC 10*3/mm3 16.30*   HEMOGLOBIN g/dL 14.5   HEMATOCRIT % 42.4   PLATELETS 10*3/mm3 259     Results from last 7 days   Lab Units 01/01/24  0745   SODIUM mmol/L 142   POTASSIUM mmol/L 4.4   CHLORIDE mmol/L 106   CO2 mmol/L 25.0   BUN mg/dL 23*   CREATININE mg/dL 1.16   GLUCOSE mg/dL 105*   CALCIUM mg/dL 9.6     Estimated Creatinine Clearance: 81.5 mL/min (by C-G formula based on SCr of 1.16 mg/dL).  Brief Urine Lab Results       None            Microbiology Results (last 10 days)       Procedure Component Value - Date/Time    MRSA Screen, PCR (Inpatient) - Swab, Nares [[id number]]  (Normal) Collected: 01/01/24 0745    Lab Status: Final result Specimen: Swab from Nares Updated: 01/01/24 0907     MRSA PCR No MRSA Detected    Narrative:      The negative predictive value of this  diagnostic test is high and should only be used to consider de-escalating anti-MRSA therapy. A positive result may indicate colonization with MRSA and must be correlated clinically.            ECG/EMG Results (most recent)       None            Results for orders placed during the hospital encounter of 12/29/23    Duplex venous lower extremity right CAR    Interpretation Summary    Normal right lower extremity venous duplex scan.          XR Knee 3 View Right    Result Date: 1/1/2024  Impression: 1. No acute osseous abnormality of the right knee. 2. Small suprapatellar joint effusion. Anterior soft tissue swelling. Electronically Signed: Juan Manuel Gorman  1/1/2024 8:33 AM EST  Workstation ID: [id number]       Estimated Creatinine Clearance: 81.5 mL/min (by C-G formula based on SCr of 1.16 mg/dL).    Assessment & Plan  Assessment/Plan       Active Hospital Problems    Diagnosis  POA    **Cellulitis [L03.90]  Yes      Resolved Hospital Problems   No resolved problems to display.     Cellulitis of right knee  -XR right knee: No acute osseous process seen, small suprapatellar joint effusion with anterior soft tissues swelling  -Blood cultures pending  -MRSA negative  -CRP 15.98, sed rate 27  -IV/oral analgesics and antibiotics as needed  -Continue IV Rocephin from ED  -WBC 16.3, Pro-Danyel 0.09, monitor trend  -Continue tramadol, IV Toradol, steroids  -IR and orthopedics consulted    Hypertension  BP Readings from Last 1 Encounters:   01/01/24 119/69   - Continue aspirin, amlodipine, lisinopril  - Monitor while admitted      VTE Prophylaxis -   Mechanical Order History:        Ordered        01/01/24 1104  Place Sequential Compression Device  Once            01/01/24 1104  Maintain Sequential Compression Device  Continuous                          Pharmalogical Order History:       None            CODE STATUS:    Code Status and Medical Interventions:   Ordered at: 01/01/24 1045     Level Of Support Discussed With:    Patient      Code Status (Patient has no pulse and is not breathing):    CPR (Attempt to Resuscitate)     Medical Interventions (Patient has pulse or is breathing):    Full Support       This patient has been examined wearing personal protective equipment.     I discussed the patient's findings and my recommendations with patient, family, nursing staff, primary care team, and consulting provider.      Signature:Electronically signed by JAZ Wills, 01/01/24, 4:26 PM EST.      I spent 35 minutes caring for Damien on this date of service. This time includes time spent by me in the following activities: reviewing tests, obtaining and/or reviewing a separately obtained history, performing a medically appropriate examination and/or evaluation, counseling and educating the patient/family/caregiver, ordering medications, tests, or procedures, referring and communicating with other health care professionals, documenting information in the medical record, independently interpreting results and communicating that information with the patient/family/caregiver, and care coordination.               Electronically signed by Sue Luna APRN at 01/02/24 0656       Emergency Department Notes    No notes of this type exist for this encounter.       Vital Signs (last 3 days)       Date/Time Temp Temp src Pulse Resp BP Patient Position SpO2    01/03/24 1413 97.8 (36.6) Oral -- 16 150/77 Lying 93    01/03/24 1021 97.9 (36.6) Oral -- 16 143/86 Lying 97    01/03/24 0600 -- -- 64 -- -- -- --    01/03/24 0549 97.6 (36.4) Oral 62 16 136/81 Lying 97    01/03/24 0500 -- -- 61 -- -- -- --    01/03/24 0400 -- -- 60 -- -- -- --    01/03/24 0300 -- -- 61 -- -- -- --    01/03/24 0200 -- -- 67 -- -- -- --    01/03/24 0158 97.9 (36.6) Oral 67 16 123/66 Lying 97    01/02/24 2203 97.9 (36.6) Oral 73 16 113/68 Lying 96    01/02/24 1900 -- -- 83 -- -- -- --    01/02/24 1736 97.6 (36.4) Oral -- 14 125/75 Lying 97    01/02/24 1609 97.2 (36.2) Oral -- 14  111/71 Lying 92    01/02/24 1548 -- Oral 76 11 114/64 Lying 98    01/02/24 1533 -- -- 76 11 122/74 -- 96    01/02/24 1518 -- -- 77 10 124/78 -- 91    01/02/24 1514 -- -- -- -- -- -- 86    01/02/24 1503 -- -- 75 11 132/68 -- 95    01/02/24 1458 -- -- 77 14 127/79 -- 97    01/02/24 1453 -- -- 75 15 113/84 -- 97    01/02/24 1448 97.9 (36.6) Oral 78 13 132/76 Lying 94    01/02/24 1241 98.2 (36.8) -- 82 21 124/83 -- 94    01/02/24 1001 97.9 (36.6) Oral -- 16 124/79 Sitting 95    01/02/24 0613 97.6 (36.4) -- 70 16 153/86 -- 94    01/02/24 0233 98.1 (36.7) Oral 73 18 140/77 -- 96    01/01/24 2238 98.8 (37.1) Oral 78 20 147/73 Lying 93    01/01/24 1821 98.2 (36.8) Oral 84 16 134/77 Lying 98    01/01/24 1432 98 (36.7) Oral 82 20 119/69 Lying 94    01/01/24 1107 98.3 (36.8) Oral 72 15 132/77 Lying 94    01/01/24 0846 -- -- 73 -- 126/77 -- 94    01/01/24 0818 -- -- 80 -- 139/85 -- 96    01/01/24 0801 -- -- 79 -- 134/85 -- 97    01/01/24 0658 98.2 (36.8) Oral 82 20 150/79 Sitting 98          Facility-Administered Medications as of 1/3/2024   Medication Dose Route Frequency Provider Last Rate Last Admin    acetaminophen (TYLENOL) tablet 650 mg  650 mg Oral Q4H PRN Edwina Tristan APRN        amLODIPine (NORVASC) tablet 10 mg  10 mg Oral Daily Edwina Tristan APRN   10 mg at 01/03/24 0906    aspirin chewable tablet 81 mg  81 mg Oral BID Edwina Tristan APRN   81 mg at 01/03/24 0907    sennosides-docusate (PERICOLACE) 8.6-50 MG per tablet 2 tablet  2 tablet Oral BID Edwina Tristan APRN   2 tablet at 01/03/24 0906    And    polyethylene glycol (MIRALAX) packet 17 g  17 g Oral Daily PRN Edwina Tristan APRN        And    bisacodyl (DULCOLAX) EC tablet 5 mg  5 mg Oral Daily PRN Edwina Tristan APRN        And    bisacodyl (DULCOLAX) suppository 10 mg  10 mg Rectal Daily PRN Edwina Tristan APRN        sennosides-docusate (PERICOLACE) 8.6-50 MG per tablet 2 tablet  2 tablet Oral BID  Trang Mann MD        And    polyethylene glycol (MIRALAX) packet 17 g  17 g Oral Daily PRN Trang Mann MD        And    bisacodyl (DULCOLAX) EC tablet 5 mg  5 mg Oral Daily PRN Trang Mann MD        And    bisacodyl (DULCOLAX) suppository 10 mg  10 mg Rectal Daily PRN Trang Mann MD        [COMPLETED] cefTRIAXone (ROCEPHIN) 2,000 mg in sodium chloride 0.9 % 100 mL IVPB  2,000 mg Intravenous Once Tammie Watson APRN   Stopped at 01/01/24 1125    cefTRIAXone (ROCEPHIN) 2,000 mg in sodium chloride 0.9 % 100 mL IVPB  2,000 mg Intravenous Daily Sofia Townsend APRN   Stopped at 01/03/24 1417    dexAMETHasone (DECADRON) injection 2 mg  2 mg Intravenous Q8H Edwina Tristan APRN   2 mg at 01/03/24 0909    Enoxaparin Sodium (LOVENOX) syringe 40 mg  40 mg Subcutaneous Daily Trang Mann MD        HYDROcodone-acetaminophen (NORCO) 7.5-325 MG per tablet 1 tablet  1 tablet Oral Q6H PRN Edwina Tristan APRN        ketorolac (TORADOL) injection 15 mg  15 mg Intravenous Q6H PRN Edwina Tristan APRN   15 mg at 01/02/24 0908    lisinopril (PRINIVIL,ZESTRIL) tablet 40 mg  40 mg Oral Q24H Edwina Tristan APRN   40 mg at 01/03/24 0907    methocarbamol (ROBAXIN) tablet 750 mg  750 mg Oral 4x Daily Edwina Tristan APRN   750 mg at 01/03/24 1138    nitroglycerin (NITROSTAT) SL tablet 0.4 mg  0.4 mg Sublingual Q5 Min PRN Edwina Tristan APRN        ondansetron (ZOFRAN) tablet 4 mg  4 mg Oral Q6H PRN Edwina Tristan APRN   4 mg at 01/03/24 0408    Pharmacy to dose vancomycin   Does not apply Continuous PRN Sofia Townsend APRN        sodium chloride 0.9 % flush 10 mL  10 mL Intravenous PRN Edwina Tristan, JAZ        sodium chloride 0.9 % flush 10 mL  10 mL Intravenous Q12H Edwina Tristan APRN   10 mL at 01/02/24 2038    sodium chloride 0.9 % flush 10 mL  10 mL Intravenous PRN Edwina Tristan, JAZ        sodium chloride 0.9 % flush  10 mL  10 mL Intravenous Q12H Trang Mann MD        sodium chloride 0.9 % flush 10 mL  10 mL Intravenous PRN Trang Mann MD        sodium chloride 0.9 % infusion 1,000 mL  1,000 mL Intravenous Continuous Edwina Lock APRN   Stopped at 01/03/24 1417    sodium chloride 0.9 % infusion 40 mL  40 mL Intravenous PRN Edwina Lock APRN        sodium chloride 0.9 % infusion 40 mL  40 mL Intravenous PRN Trang Mann MD        traMADol (ULTRAM) tablet 50 mg  50 mg Oral Q6H PRN Edwina Lock APRN        Vancomycin HCl 1,250 mg in sodium chloride 0.9 % 250 mL IVPB  1,250 mg Intravenous Q12H Sofia Townsend APRN   Stopped at 01/03/24 1014    [COMPLETED] vancomycin IVPB 1750 mg in 0.9% Sodium Chloride (premix) 500 mL  20 mg/kg (Adjusted) Intravenous Once Sofia Townsend APRN   Stopped at 01/02/24 2038        Operative/Procedure Notes (all)        Casey Kwan MD at 01/02/24 1358  Version 1 of 1         NAME: Damien Avalos     YOB: 1965    DATE OF SURGERY: 1/2/2024    PREOPERATIVE DIAGNOSIS: Right knee effusion with suspected infection    POSTOPERATIVE DIAGNOSIS: Same    PROCEDURE PERFORMED: Right knee arthroscopy with irrigation debridement and synovectomy  Fluid sent for culture as well as tissue    SURGEON:Casey Kwan M.D.    ASSISTANT: Dayanna lock np  Assistant was utilized for leg positioning running the arthroscopy shaver foot pump dressing placement sutures    Estimated Blood Loss: minimal  Specimens : Fluid cultures  Complications: none    DESCRIPTION OF PROCEDURE: The patient was taken to the operating room and placed in the supine position. After adequate induction of general anesthesia the leg was prepped and draped in the usual sterile fashion exsanguinated tourniquet placed.  The leg was not exsanguinated.    We started the case by aspirating 50 cc of straw-colored fluid with some precipitate from the suprapatellar region.  This was  sent for culture  Next we used a culture swab to swab inside the joint which was sent for second culture  Diagnostic arthroscopy revealed some inflamed synovium  No gross purulence noted.  A shaver was introduced and a complete synovectomy starting in the suprapatellar pouch working into the medial gutter, anterior interval, lateral interval, lateral joint, medial joint was then performed  We irrigated with total of 6 L of fluid.  There was no obvious  Cartilage damage at this point.  Hemostasis was achieved  Sutures placed  The knee was injected with ropivacaine, Toradol, vancomycin and tranexamic acid   Sterile dressings were then applied the patient was extubated and transferred to the recovery room.  At the end of the case the sponge and needle counts were reported as being correct and there were no known complications.  Infectious diseases been consulted  Will continue antibiotics await cultures.    Casey Kwan M.D.  1/2/2024     Electronically signed by Casey Kwan MD at 01/02/24 1448          Physician Progress Notes (all)        Sofia Townsend APRN at 01/03/24 1426          Infectious Diseases Progress Note      LOS: 0 days   Patient Care Team:  Brian Jones MD as PCP - General    Chief Complaint: Right knee pain    Subjective       The patient has been afebrile for the last 24 hours.  The patient is on room air, hemodynamically stable, and is tolerating antimicrobial therapy.  No new complaints      Review of Systems:   Review of Systems   Constitutional: Negative.    HENT: Negative.     Eyes: Negative.    Respiratory: Negative.     Cardiovascular: Negative.    Gastrointestinal: Negative.    Endocrine: Negative.    Genitourinary: Negative.    Musculoskeletal:  Positive for joint swelling.   Neurological: Negative.    Psychiatric/Behavioral: Negative.     All other systems reviewed and are negative.       Objective     Vital Signs  Temp:  [97.2 °F (36.2 °C)-97.9 °F (36.6 °C)]  97.8 °F (36.6 °C)  Heart Rate:  [60-83] 64  Resp:  [10-16] 16  BP: (111-150)/(64-86) 150/77    Physical Exam:  Physical Exam  Vitals and nursing note reviewed.   Constitutional:       General: He is not in acute distress.     Appearance: Normal appearance. He is well-developed and normal weight. He is not diaphoretic.   HENT:      Head: Normocephalic and atraumatic.   Eyes:      Conjunctiva/sclera: Conjunctivae normal.      Pupils: Pupils are equal, round, and reactive to light.   Cardiovascular:      Rate and Rhythm: Normal rate and regular rhythm.      Heart sounds: Normal heart sounds, S1 normal and S2 normal.   Pulmonary:      Effort: Pulmonary effort is normal. No respiratory distress.      Breath sounds: Normal breath sounds. No stridor. No wheezing or rales.   Abdominal:      General: Bowel sounds are normal. There is no distension.      Palpations: Abdomen is soft. There is no mass.      Tenderness: There is no abdominal tenderness. There is no guarding.   Musculoskeletal:         General: No deformity. Normal range of motion.      Cervical back: Neck supple.      Comments: Dressings on right knee-no erythema above or below dressings   Skin:     General: Skin is warm and dry.      Coloration: Skin is not pale.      Findings: No erythema or rash.   Neurological:      Mental Status: He is alert and oriented to person, place, and time.      Cranial Nerves: No cranial nerve deficit.   Psychiatric:         Mood and Affect: Mood normal.          Results Review:    I have reviewed all clinical data, test, lab, and imaging results.     Radiology  No Radiology Exams Resulted Within Past 24 Hours    Cardiology    Laboratory    Results from last 7 days   Lab Units 01/03/24  0334 01/02/24  0432 01/01/24  0745   WBC 10*3/mm3 20.40* 18.00* 16.30*   HEMOGLOBIN g/dL 14.1 14.5 14.5   HEMATOCRIT % 41.2 42.6 42.4   PLATELETS 10*3/mm3 330 306 259     Results from last 7 days   Lab Units 01/03/24  0334 01/02/24  0626  01/01/24  0745   SODIUM mmol/L 137 134* 142   POTASSIUM mmol/L 4.9 4.5 4.4   CHLORIDE mmol/L 102 103 106   CO2 mmol/L 26.0 24.0 25.0   BUN mg/dL 31* 23* 23*   CREATININE mg/dL 1.01 0.88 1.16   GLUCOSE mg/dL 178* 124* 105*   CALCIUM mg/dL 9.5 9.1 9.6         Results from last 7 days   Lab Units 01/01/24  0745   SED RATE mm/hr 27*         Microbiology   Microbiology Results (last 10 days)       Procedure Component Value - Date/Time    Wound Culture - Wound, Knee, Right [[id number]]  (Abnormal) Collected: 01/02/24 1402    Lab Status: Preliminary result Specimen: Wound from Knee, Right Updated: 01/03/24 0830     Wound Culture Light growth (2+) Staphylococcus aureus     Gram Stain Moderate (3+) WBCs per low power field      No organisms seen    Wound Culture - Wound, Knee, Right [[id number]]  (Abnormal) Collected: 01/02/24 1400    Lab Status: Preliminary result Specimen: Wound from Knee, Right Updated: 01/03/24 0829     Wound Culture Heavy growth (4+) Staphylococcus aureus     Gram Stain Moderate (3+) WBCs per low power field      No organisms seen    Blood Culture - Blood, Arm, Right [[id number]]  (Normal) Collected: 01/01/24 0757    Lab Status: Preliminary result Specimen: Blood from Arm, Right Updated: 01/03/24 0815     Blood Culture No growth at 2 days    Blood Culture - Blood, Arm, Left [[id number]]  (Normal) Collected: 01/01/24 0745    Lab Status: Preliminary result Specimen: Blood from Arm, Left Updated: 01/03/24 0800     Blood Culture No growth at 2 days    MRSA Screen, PCR (Inpatient) - Swab, Nares [[id number]]  (Normal) Collected: 01/01/24 0745    Lab Status: Final result Specimen: Swab from Nares Updated: 01/01/24 0907     MRSA PCR No MRSA Detected    Narrative:      The negative predictive value of this diagnostic test is high and should only be used to consider de-escalating anti-MRSA therapy. A positive result may indicate colonization with MRSA and must be correlated clinically.            Medication Review:        Schedule Meds  amLODIPine, 10 mg, Oral, Daily  aspirin, 81 mg, Oral, BID  cefTRIAXone, 2,000 mg, Intravenous, Daily  dexAMETHasone, 2 mg, Intravenous, Q8H  enoxaparin, 40 mg, Subcutaneous, Daily  lisinopril, 40 mg, Oral, Q24H  methocarbamol, 750 mg, Oral, 4x Daily  senna-docusate sodium, 2 tablet, Oral, BID  senna-docusate sodium, 2 tablet, Oral, BID  sodium chloride, 10 mL, Intravenous, Q12H  sodium chloride, 10 mL, Intravenous, Q12H  vancomycin, 1,250 mg, Intravenous, Q12H        Infusion Meds  Pharmacy to dose vancomycin,   sodium chloride, 1,000 mL, Last Rate: Stopped (01/03/24 1417)        PRN Meds    acetaminophen    senna-docusate sodium **AND** polyethylene glycol **AND** bisacodyl **AND** bisacodyl    senna-docusate sodium **AND** polyethylene glycol **AND** bisacodyl **AND** bisacodyl    HYDROcodone-acetaminophen    ketorolac    nitroglycerin    ondansetron **OR** [DISCONTINUED] ondansetron    Pharmacy to dose vancomycin    [COMPLETED] Insert Peripheral IV **AND** sodium chloride    sodium chloride    sodium chloride    sodium chloride    sodium chloride    traMADol        Assessment & Plan       Antimicrobial Therapy   1.  IV ceftriaxone        2.  IV vancomycin        3.        4.        5.            Assessment     Right native knee infection in patient who is immunocompetent.  The patient is s/p I&D and washout on January 2, 2024.  Intraoperative cultures are Staphylococcus aureus     S/p right knee arthroscopy on December 22, 2023.      Leukocytosis.  Trending up but likely reactive to surgery.  Patient denies diarrhea        Plan     Continue IV ceftriaxone 2 g every 24 hours  Continue IV Vancomycin- ask pharmacy to monitor and dose-can discontinue if cultures finalized as MSSA  Waiting on interoperative cultures to finalize  Patient will need 6 weeks of IV antibiotics from surgery-last date on 2/12/2023  Patient will need a PICC line before discharge-please remove when IV antibiotics are  completed.  Standard weekly PICC line care  Weekly labs CBC, creatinine and sed rate x 6 weeks  Continue supportive care  A.m. labs   Case discussed with patient and family member at beside  Case discussed with case management         JAZ Tripp  24  14:26 EST    Note is dictated utilizing voice recognition software/Dragon    Electronically signed by Sofia Townsend APRN at 24 1431       Kaushik Greer PA-C at 24 1344          [employer] Observation Unit Progress Note    Patient Name: Damien Avalos  : 1965  MRN: [id number]  Primary Care Physician: Brian Jones MD  Date of admission: 2024     Patient Care Team:  Brian Jones MD as PCP - General          Subjective   History Present Illness     Chief Complaint:   Chief Complaint   Patient presents with    Leg Pain     Left knee pain started Thursday night, was here on Thursday for the same.  Pt seen at Dr. Kwan office and had knee drained, told to come back to ER if pain persist.       Right knee pain and swelling    History of Present Illness  Mr. Avalos is a 58 y.o.  presents to [facility] complaining of leg pain        History of Present Illness     ED 24: 58 y.o. male presents with right knee pain. Onset of symptoms was Thursday evening and has been progressively worsening despite more conservative treatment measures including ice, rest and elevation.  Symptoms are associated with ability to move, exercise, and perform activities of daily living.  Symptoms are aggravated by weight bearing and ROM necessary for activities of daily living.   Symptoms improve with rest, ice and elevation only minimally. He had an aspiration in the office on Friday with cloudy aspirate noted. It was sent for culture. He was started on antibiotics and steriods on Friday as well. He came back to the hospital to get labs on Saturday , however, there was not a lab order. He presented back to the  emergency room this am with continued complaints of pain and swelling.      Observation 1/1/24: Patient is a 58-year-old male presenting to the hospital with right knee pain.  Patient states symptoms of body aches chills and increased knee pain started Thursday.  Patient recently had aspiration office with orthopedic physician and noted cloudy aspiration culture sent.  Patient was started on antibiotics and steroids on Friday.  Patient states he is sent to hospital to get labs but order not present per staff.  Patient reported increased swelling and redness with streaking to right lower extremity.  Denies shortness of breath, chest pain, nausea, vomiting falls or recent injury.     1/3/2024:  Patient reports he feels significantly improved and has increased mobility and right knee.  No other new or acute symptoms are noted.        ROS  Review of Systems   Constitutional: Positive for malaise/fatigue.   HENT: Negative.     Eyes: Negative.    Cardiovascular: Negative.    Respiratory: Negative.     Endocrine: Negative.    Hematologic/Lymphatic: Negative.    Skin: Negative.  Positive for color change.   Musculoskeletal:  Positive for joint pain and joint swelling.   Gastrointestinal: Negative.    Genitourinary: Negative.    Neurological:  Positive for weakness.   Psychiatric/Behavioral: Negative.     Allergic/Immunologic: Negative.        Personal History     Past Medical History:   Past Medical History:   Diagnosis Date    Hypertension        Surgical History:      Past Surgical History:   Procedure Laterality Date    KNEE ARTHROSCOPY Right 1/2/2024    Procedure: KNEE ARTHROSCOPY;  Surgeon: Casey Kwan MD;  Location: [facility];  Service: Orthopedics;  Laterality: Right;    KNEE INCISION AND DRAINAGE Right 1/2/2024    Procedure: KNEE INCISION AND DRAINAGE;  Surgeon: Casey Kwan MD;  Location: [facility] OR;  Service: Orthopedics;  Laterality: Right;    SHOULDER SURGERY      right shoulder-slap  tear           Family History: family history includes Heart disease in his mother. Otherwise pertinent FHx was reviewed and unremarkable.     Social History:  reports that he has never smoked. He has never used smokeless tobacco. He reports that he does not drink alcohol and does not use drugs.      Medications:  Prior to Admission medications    Medication Sig Start Date End Date Taking? Authorizing Provider   acetaminophen (TYLENOL) 325 MG tablet Take 2 tablets by mouth Every 6 (Six) Hours As Needed for Mild Pain.   Yes Pili Gonzalez MD   amLODIPine (NORVASC) 10 MG tablet Take 1 tablet by mouth Daily. 6/23/22  Yes Brian Jones MD   aspirin 81 MG chewable tablet Chew 2 tablets Daily.   Yes Pili Gonzalez MD   benazepril (LOTENSIN) 40 MG tablet Take 1 tablet by mouth Daily. 6/23/22  Yes Brian Jones MD   meloxicam (MOBIC) 15 MG tablet Take 1 tablet by mouth Daily.   Yes Pili Gonzalez MD   methylPREDNISolone (MEDROL) 4 MG dose pack Take  by mouth 2 (Two) Times a Day. Take as directed on package instructions. 12/29/23 1/3/24 Yes Pili Gonzalez MD   Multiple Vitamins-Minerals (MULTIVITAMIN ADULT) tablet Take 1 tablet by mouth Daily.   Yes Pili Gonzalez MD   sulfamethoxazole-trimethoprim (BACTRIM DS,SEPTRA DS) 800-160 MG per tablet Take 1 tablet by mouth 2 (Two) Times a Day. 12/29/23 1/4/24 Yes Pili Gonzalez MD   traMADol (ULTRAM) 50 MG tablet Take 1 tablet by mouth Every 6 (Six) Hours As Needed for Moderate Pain.    Pili Gonzalez MD       Allergies:  No Known Allergies    Objective   Objective     Vital Signs  Temp:  [97.2 °F (36.2 °C)-97.9 °F (36.6 °C)] 97.9 °F (36.6 °C)  Heart Rate:  [60-83] 64  Resp:  [10-16] 16  BP: (111-143)/(64-86) 143/86  SpO2:  [86 %-98 %] 97 %  on  Flow (L/min):  [1-6] 2;   Device (Oxygen Therapy): nasal cannula  Body mass index is 35.12 kg/m².    Physical Exam  Physical Exam  Vitals reviewed.   Constitutional:        General: He is not in acute distress.     Appearance: Normal appearance. He is normal weight. He is not ill-appearing, toxic-appearing or diaphoretic.   HENT:      Head: Normocephalic.      Right Ear: External ear normal.      Left Ear: External ear normal.      Nose: Nose normal.      Mouth/Throat:      Mouth: Mucous membranes are moist.   Eyes:      Extraocular Movements: Extraocular movements intact.   Cardiovascular:      Rate and Rhythm: Normal rate and regular rhythm.      Pulses: Normal pulses.      Heart sounds: Normal heart sounds.   Pulmonary:      Effort: Pulmonary effort is normal.      Breath sounds: Normal breath sounds.   Abdominal:      General: Bowel sounds are normal.      Palpations: Abdomen is soft.      Tenderness: There is no abdominal tenderness.   Musculoskeletal:         General: Normal range of motion.      Cervical back: Normal range of motion.      Right lower leg: No edema.      Left lower leg: No edema.   Skin:     General: Skin is warm and dry.      Capillary Refill: Capillary refill takes less than 2 seconds.   Neurological:      General: No focal deficit present.      Mental Status: He is alert and oriented to person, place, and time.   Psychiatric:         Mood and Affect: Mood normal.         Behavior: Behavior normal.         Thought Content: Thought content normal.         Judgment: Judgment normal.     Results Review:  I have personally reviewed most recent lab results and radiology images and interpretations and agree with findings, most notably: CMP, CBC, CRP, sed rate, blood and wound cultures and x-ray of right knee.    Results from last 7 days   Lab Units 01/03/24  0334   WBC 10*3/mm3 20.40*   HEMOGLOBIN g/dL 14.1   HEMATOCRIT % 41.2   PLATELETS 10*3/mm3 330     Results from last 7 days   Lab Units 01/03/24  0334 01/02/24  0626 01/01/24  0745   SODIUM mmol/L 137   < > 142   POTASSIUM mmol/L 4.9   < > 4.4   CHLORIDE mmol/L 102   < > 106   CO2 mmol/L 26.0   < > 25.0   BUN  mg/dL 31*   < > 23*   CREATININE mg/dL 1.01   < > 1.16   GLUCOSE mg/dL 178*   < > 105*   CALCIUM mg/dL 9.5   < > 9.6   PROCALCITONIN ng/mL  --   --  0.09    < > = values in this interval not displayed.     Estimated Creatinine Clearance: 93.6 mL/min (by C-G formula based on SCr of 1.01 mg/dL).  Brief Urine Lab Results       None            Microbiology Results (last 10 days)       Procedure Component Value - Date/Time    Wound Culture - Wound, Knee, Right [[id number]]  (Abnormal) Collected: 01/02/24 1402    Lab Status: Preliminary result Specimen: Wound from Knee, Right Updated: 01/03/24 0830     Wound Culture Light growth (2+) Staphylococcus aureus     Gram Stain Moderate (3+) WBCs per low power field      No organisms seen    Wound Culture - Wound, Knee, Right [[id number]]  (Abnormal) Collected: 01/02/24 1400    Lab Status: Preliminary result Specimen: Wound from Knee, Right Updated: 01/03/24 0829     Wound Culture Heavy growth (4+) Staphylococcus aureus     Gram Stain Moderate (3+) WBCs per low power field      No organisms seen    Blood Culture - Blood, Arm, Right [[id number]]  (Normal) Collected: 01/01/24 0757    Lab Status: Preliminary result Specimen: Blood from Arm, Right Updated: 01/03/24 0815     Blood Culture No growth at 2 days    Blood Culture - Blood, Arm, Left [[id number]]  (Normal) Collected: 01/01/24 0745    Lab Status: Preliminary result Specimen: Blood from Arm, Left Updated: 01/03/24 0800     Blood Culture No growth at 2 days    MRSA Screen, PCR (Inpatient) - Swab, Nares [[id number]]  (Normal) Collected: 01/01/24 0745    Lab Status: Final result Specimen: Swab from Nares Updated: 01/01/24 0907     MRSA PCR No MRSA Detected    Narrative:      The negative predictive value of this diagnostic test is high and should only be used to consider de-escalating anti-MRSA therapy. A positive result may indicate colonization with MRSA and must be correlated clinically.            ECG/EMG Results (most recent)        Procedure Component Value Units Date/Time    SCANNED - TELEMETRY   [[id number]] Resulted: 01/01/24     Updated: 01/02/24 0856    SCANNED - TELEMETRY   [[id number]] Resulted: 01/01/24     Updated: 01/02/24 0856    SCANNED - TELEMETRY   [[id number]] Resulted: 01/01/24     Updated: 01/03/24 0948    SCANNED - TELEMETRY   [[id number]] Resulted: 01/01/24     Updated: 01/03/24 1006    SCANNED - TELEMETRY   [[id number]] Resulted: 01/01/24     Updated: 01/03/24 1236    SCANNED - TELEMETRY   [[id number]] Resulted: 01/01/24     Updated: 01/03/24 1236    SCANNED - TELEMETRY   [[id number]] Resulted: 01/01/24     Updated: 01/03/24 1245    SCANNED - TELEMETRY   [[id number]] Resulted: 01/01/24     Updated: 01/03/24 1245    SCANNED - TELEMETRY   [[id number]] Resulted: 01/01/24     Updated: 01/03/24 1247            Results for orders placed during the hospital encounter of 12/29/23    Duplex venous lower extremity right CAR    Interpretation Summary    Normal right lower extremity venous duplex scan.          XR Knee 3 View Right    Result Date: 1/1/2024  Impression: 1. No acute osseous abnormality of the right knee. 2. Small suprapatellar joint effusion. Anterior soft tissue swelling. Electronically Signed: Juan Manuel Gorman  1/1/2024 8:33 AM EST  Workstation ID: [id number]       Estimated Creatinine Clearance: 93.6 mL/min (by C-G formula based on SCr of 1.01 mg/dL).    Assessment & Plan   Assessment/Plan       Active Hospital Problems    Diagnosis  POA    **Cellulitis [L03.90]  Yes      Resolved Hospital Problems   No resolved problems to display.     Cellulitis of right knee  -XR right knee: No acute osseous process seen, small suprapatellar joint effusion with anterior soft tissues swelling  -Blood cultures pending  -MRSA negative  -CRP 15.98, sed rate 27  -IV/oral analgesics and antibiotics as needed  -Continue IV Rocephin from ED  -WBC 16.3, Pro-Danyel 0.09, monitor trend  -Continue tramadol, IV Toradol, steroids  -Orthopedic surgery  consulted who performed a right knee arthroscopy as well as I&D on 1/2/2023  -ID consulted initiated vancomycin recommend continuing to follow cultures and IV antibiotics for now  -Hospitalist consulted for further management     Hypertension        BP Readings from Last 1 Encounters:   01/01/24 119/69   - Continue aspirin, amlodipine, lisinopril  - Monitor while admitted     Obesity (BMI: 35.12)  -Encouraged on lifestyle modifications            VTE Prophylaxis -   Mechanical Order History:        Ordered        01/01/24 1104  Place Sequential Compression Device  Once            01/01/24 1104  Maintain Sequential Compression Device  Continuous                          Pharmalogical Order History:       None            CODE STATUS:    Code Status and Medical Interventions:   Ordered at: 01/01/24 1045     Level Of Support Discussed With:    Patient     Code Status (Patient has no pulse and is not breathing):    CPR (Attempt to Resuscitate)     Medical Interventions (Patient has pulse or is breathing):    Full Support       This patient has been examined wearing personal protective equipment.     I discussed the patient's findings and my recommendations with patient and nursing staff.      Signature:Electronically signed by Kaushik Greer PA-C, 01/03/24, 1:47 PM EST.        Electronically signed by Kaushik Greer PA-C at 01/03/24 1347       Edwina Tristan APRN at 01/03/24 0907          ORTHO PROGRESS NOTE      Patient: Damien Avalos    Date of Admission: 1/1/2024  7:01 AM    YOB: 1965    Medical Record Number: [id number]    Attending Physician: Brett Pandey MD        Status post right knee arthroscopy with irrigation debridement and synovctomy      Systemic or Specific Complaints:no complaints, pain much improved      Allergies: No Known Allergies    Medications:   Current Medications:  Scheduled Meds:amLODIPine, 10 mg, Oral, Daily  aspirin, 81 mg, Oral, BID  cefTRIAXone, 2,000 mg,  Intravenous, Daily  dexAMETHasone, 2 mg, Intravenous, Q8H  lisinopril, 40 mg, Oral, Q24H  methocarbamol, 750 mg, Oral, 4x Daily  senna-docusate sodium, 2 tablet, Oral, BID  sodium chloride, 10 mL, Intravenous, Q12H  vancomycin, 1,250 mg, Intravenous, Q12H      Continuous Infusions:Pharmacy to dose vancomycin,   sodium chloride, 1,000 mL, Last Rate: 25 mL/hr at 01/03/24 0440      PRN Meds:.  acetaminophen    senna-docusate sodium **AND** polyethylene glycol **AND** bisacodyl **AND** bisacodyl    HYDROcodone-acetaminophen    ketorolac    nitroglycerin    ondansetron **OR** [DISCONTINUED] ondansetron    Pharmacy to dose vancomycin    [COMPLETED] Insert Peripheral IV **AND** sodium chloride    sodium chloride    sodium chloride    traMADol      Physical Exam: 58 y.o. male  General Appearance:                Pain Relief: Patient reports      Vitals:    01/03/24 0400 01/03/24 0500 01/03/24 0549 01/03/24 0600   BP:   136/81    BP Location:   Right arm    Patient Position:   Lying    Pulse: 60 61 62 64   Resp:   16    Temp:   97.6 °F (36.4 °C)    TempSrc:   Oral    SpO2:   97%    Weight:       Height:              HEENT:    Normocephalic, without obvious abnormality, atraumatic.          PERRLA; EOMI; Neck supple with trachea midline. No JVD.    No lymphadenopathy     Lungs:     Clear to auscultation,respirations regular, even and                   Unlabored      Heart:    Regular rhythm and normal rate, normal S1 and S2, no            murmur, no gallop, no rub, no click     Abdomen:     Normal bowel sounds, no masses, no organomegaly, soft        non-tender, non-distended, no guarding, no rebound                 tenderness       Extremities:   Operative extremity neurovascular status intact. ROM intact.    Incision intact w/out signs or  symptoms of infection. No           edema, no cyanosis, no calf tenderness  No sign or symptom of dvt/pe     Pulses:     Pulses palpable and equal bilaterally     Skin:     Skin Warm/Dry  w/out ulceration, ecchymosis, rash, or   cyanosis     Activity: Mobilizing Per P.T.   Weight Bearing: As Tolerated    Diagnostic Tests:   [unfilled]      [unfilled]    Assessment:  Status post surgery  Post-operative Pain  Immobility    Plan:    Continue efforts to mobilize  Continue Pain Control Measures  Awaiting on ID recommendations for discharge antibiotics        Discharge Plan: continue to work on discharge    Date: [unfilled] Time: 09:07 JAZ Zuniga      Electronically signed by Edwina Tristan APRN at 24 0909       Sue Luna APRN at 24 1050          [employer] Observation Unit Progress Note     Patient Name: Damien Avalos  : 1965  MRN: [id number]  Primary Care Physician: Brian Jones MD  Date of admission: 2024     Patient Care Team:  Brian Jones MD as PCP - General          Subjective   History Present Illness     Chief Complaint:   Chief Complaint   Patient presents with    Leg Pain     Left knee pain started Thursday night, was here on Thursday for the same.  Pt seen at Dr. Kwan office and had knee drained, told to come back to ER if pain persist.       Leg Pain    Mr. Avalos is a 58 y.o.  presents to [facility] complaining of leg pain      History of Present Illness    ED 24: 58 y.o. male presents with right knee pain. Onset of symptoms was Thursday evening and has been progressively worsening despite more conservative treatment measures including ice, rest and elevation.  Symptoms are associated with ability to move, exercise, and perform activities of daily living.  Symptoms are aggravated by weight bearing and ROM necessary for activities of daily living.   Symptoms improve with rest, ice and elevation only minimally. He had an aspiration in the office on Friday with cloudy aspirate noted. It was sent for culture. He was started on antibiotics and steriods on Friday as well. He came back to the hospital to get  labs on Saturday , however, there was not a lab order. He presented back to the emergency room this am with continued complaints of pain and swelling.      Observation 1/1/24: Patient is a 58-year-old male presenting to the hospital with right knee pain.  Patient states symptoms of body aches chills and increased knee pain started Thursday.  Patient recently had aspiration office with orthopedic physician and noted cloudy aspiration culture sent.  Patient was started on antibiotics and steroids on Friday.  Patient states he is sent to hospital to get labs but order not present per staff.  Patient reported increased swelling and redness with streaking to right lower extremity.  Denies shortness of breath, chest pain, nausea, vomiting falls or recent injury.    1/2/24: Patient reports improved swelling and decreased redness to right lower extremity today.  Patient reports some continued pain to right knee but able to ambulate and apply pressure.    Review of Systems   Constitutional: Negative. Negative for fever.   HENT: Negative.     Eyes: Negative.    Cardiovascular: Negative.    Respiratory: Negative.     Endocrine: Negative.    Hematologic/Lymphatic: Negative.    Skin:  Positive for color change.   Musculoskeletal:  Positive for joint pain and joint swelling.   Gastrointestinal: Negative.    Genitourinary: Negative.    Neurological: Negative.    Psychiatric/Behavioral: Negative.     Allergic/Immunologic: Negative.            Personal History     Past Medical History:   Past Medical History:   Diagnosis Date    Hypertension        Surgical History:      Past Surgical History:   Procedure Laterality Date    SHOULDER SURGERY      right shoulder-slap tear           Family History: family history includes Heart disease in his mother. Otherwise pertinent FHx was reviewed and unremarkable.     Social History:  reports that he has never smoked. He has never used smokeless tobacco. He reports that he does not drink alcohol  and does not use drugs.      Medications:  Prior to Admission medications    Medication Sig Start Date End Date Taking? Authorizing Provider   acetaminophen (TYLENOL) 325 MG tablet Take 2 tablets by mouth Every 6 (Six) Hours As Needed for Mild Pain.   Yes Pili Gonzalez MD   amLODIPine (NORVASC) 10 MG tablet Take 1 tablet by mouth Daily. 6/23/22  Yes Brian Jones MD   aspirin 81 MG chewable tablet Chew 2 tablets Daily.   Yes Pili Gonzalez MD   benazepril (LOTENSIN) 40 MG tablet Take 1 tablet by mouth Daily. 6/23/22  Yes Brian Jones MD   meloxicam (MOBIC) 15 MG tablet Take 1 tablet by mouth Daily.   Yes Pili Gonzalez MD   methylPREDNISolone (MEDROL) 4 MG dose pack Take  by mouth 2 (Two) Times a Day. Take as directed on package instructions. 12/29/23 1/3/24 Yes Pili Gonzalez MD   Multiple Vitamins-Minerals (MULTIVITAMIN ADULT) tablet Take 1 tablet by mouth Daily.   Yes Pili Gonzalez MD   sulfamethoxazole-trimethoprim (BACTRIM DS,SEPTRA DS) 800-160 MG per tablet Take 1 tablet by mouth 2 (Two) Times a Day. 12/29/23 1/4/24 Yes Pili Gonzalez MD   traMADol (ULTRAM) 50 MG tablet Take 1 tablet by mouth Every 6 (Six) Hours As Needed for Moderate Pain.    Pili Gonzalez MD       Allergies:  No Known Allergies    Objective   Objective     Vital Signs  Temp:  [97.6 °F (36.4 °C)-98.8 °F (37.1 °C)] 97.9 °F (36.6 °C)  Heart Rate:  [70-84] 70  Resp:  [15-20] 16  BP: (119-153)/(69-86) 124/79  SpO2:  [93 %-98 %] 95 %  on   ;   Device (Oxygen Therapy): room air  Body mass index is 35.12 kg/m².    Physical Exam  Vitals and nursing note reviewed.   Constitutional:       Appearance: Normal appearance.   HENT:      Head: Normocephalic and atraumatic.      Right Ear: External ear normal.      Left Ear: External ear normal.      Nose: Nose normal.      Mouth/Throat:      Pharynx: Oropharynx is clear.   Eyes:      Extraocular Movements: Extraocular movements intact.       Conjunctiva/sclera: Conjunctivae normal.      Pupils: Pupils are equal, round, and reactive to light.   Cardiovascular:      Rate and Rhythm: Normal rate and regular rhythm.      Pulses: Normal pulses.      Heart sounds: Normal heart sounds.   Pulmonary:      Effort: Pulmonary effort is normal.      Breath sounds: Normal breath sounds.   Abdominal:      General: Bowel sounds are normal.      Palpations: Abdomen is soft.   Musculoskeletal:         General: Swelling and tenderness present.      Cervical back: Normal range of motion.      Right lower le+ Edema present.   Skin:     General: Skin is warm.      Capillary Refill: Capillary refill takes less than 2 seconds.   Neurological:      Mental Status: He is alert and oriented to person, place, and time.   Psychiatric:         Mood and Affect: Mood normal.         Behavior: Behavior normal.         Thought Content: Thought content normal.         Judgment: Judgment normal.           Results Review:  I have personally reviewed most recent cardiac tracings, lab results, microbiology results, and radiology images and interpretations and agree with findings, most notably: CBC, CMP, ESR, CRP, x-ray.    Results from last 7 days   Lab Units 24  0432   WBC 10*3/mm3 18.00*   HEMOGLOBIN g/dL 14.5   HEMATOCRIT % 42.6   PLATELETS 10*3/mm3 306     Results from last 7 days   Lab Units 24  0626 24  0745   SODIUM mmol/L 134* 142   POTASSIUM mmol/L 4.5 4.4   CHLORIDE mmol/L 103 106   CO2 mmol/L 24.0 25.0   BUN mg/dL 23* 23*   CREATININE mg/dL 0.88 1.16   GLUCOSE mg/dL 124* 105*   CALCIUM mg/dL 9.1 9.6   PROCALCITONIN ng/mL  --  0.09     Estimated Creatinine Clearance: 107.4 mL/min (by C-G formula based on SCr of 0.88 mg/dL).  Brief Urine Lab Results       None            Microbiology Results (last 10 days)       Procedure Component Value - Date/Time    Blood Culture - Blood, Arm, Right [[id number]]  (Normal) Collected: 24 0750    Lab Status:  Preliminary result Specimen: Blood from Arm, Right Updated: 01/02/24 0815     Blood Culture No growth at 24 hours    Blood Culture - Blood, Arm, Left [[id number]]  (Normal) Collected: 01/01/24 0745    Lab Status: Preliminary result Specimen: Blood from Arm, Left Updated: 01/02/24 0801     Blood Culture No growth at 24 hours    MRSA Screen, PCR (Inpatient) - Swab, Nares [[id number]]  (Normal) Collected: 01/01/24 0745    Lab Status: Final result Specimen: Swab from Nares Updated: 01/01/24 0907     MRSA PCR No MRSA Detected    Narrative:      The negative predictive value of this diagnostic test is high and should only be used to consider de-escalating anti-MRSA therapy. A positive result may indicate colonization with MRSA and must be correlated clinically.            ECG/EMG Results (most recent)       Procedure Component Value Units Date/Time    SCANNED - TELEMETRY   [[id number]] Resulted: 01/01/24     Updated: 01/02/24 0856    SCANNED - TELEMETRY   [[id number]] Resulted: 01/01/24     Updated: 01/02/24 0856            Results for orders placed during the hospital encounter of 12/29/23    Duplex venous lower extremity right CAR    Interpretation Summary    Normal right lower extremity venous duplex scan.          XR Knee 3 View Right    Result Date: 1/1/2024  Impression: 1. No acute osseous abnormality of the right knee. 2. Small suprapatellar joint effusion. Anterior soft tissue swelling. Electronically Signed: Juan Manuel Gorman  1/1/2024 8:33 AM EST  Workstation ID: [id number]       Estimated Creatinine Clearance: 107.4 mL/min (by C-G formula based on SCr of 0.88 mg/dL).    Assessment & Plan   Assessment/Plan       Active Hospital Problems    Diagnosis  POA    **Cellulitis [L03.90]  Yes      Resolved Hospital Problems   No resolved problems to display.       Cellulitis of right knee  -XR right knee: No acute osseous process seen, small suprapatellar joint effusion with anterior soft tissues swelling  -Blood cultures  pending  -MRSA negative  -CRP 15.98, sed rate 27  -IV/oral analgesics and antibiotics as needed  -Continue IV Rocephin from ED  -WBC 16.3, Pro-Danyel 0.09, monitor trend  -Continue tramadol, IV Toradol, steroids  -Orthopedics consulted     Hypertension      BP Readings from Last 1 Encounters:   01/01/24 119/69   - Continue aspirin, amlodipine, lisinopril  - Monitor while admitted          VTE Prophylaxis -   Mechanical Order History:        Ordered        01/01/24 1104  Place Sequential Compression Device  Once            01/01/24 1104  Maintain Sequential Compression Device  Continuous                          Pharmalogical Order History:       None            CODE STATUS:    Code Status and Medical Interventions:   Ordered at: 01/01/24 1045     Level Of Support Discussed With:    Patient     Code Status (Patient has no pulse and is not breathing):    CPR (Attempt to Resuscitate)     Medical Interventions (Patient has pulse or is breathing):    Full Support       This patient has been examined wearing personal protective equipment.     I discussed the patient's findings and my recommendations with patient, family, nursing staff, primary care team, and consulting provider.      Signature:Electronically signed by JAZ Wills, 01/02/24, 10:50 AM EST.      I spent 35 minutes caring for Damien on this date of service. This time includes time spent by me in the following activities: reviewing tests, obtaining and/or reviewing a separately obtained history, performing a medically appropriate examination and/or evaluation, counseling and educating the patient/family/caregiver, ordering medications, tests, or procedures, referring and communicating with other health care professionals, documenting information in the medical record, independently interpreting results and communicating that information with the patient/family/caregiver, and care coordination.               Electronically signed by Sue Luna APRN at  01/02/24 1059          Consult Notes (all)        Ginny Crump MD at 01/02/24 1614        Consult Orders    1. Inpatient Infectious Diseases Consult [[id number]] ordered by Edwina Tristan APRN at 01/02/24 1442                 Infectious Diseases Consult Note    Referring Provider: Dr. Kwan    Reason for Consultation: Right knee infection    Patient Care Team:  Brian Jones MD as PCP - General    Chief complaint right knee pain    Subjective     The patient has been afebrile for the last 24 hours.  The patient is on  3 L of oxygen by nasal cannula hemodynamically stable, and is tolerating antimicrobial therapy.  Patient is status post surgery    History of present illness:      This is a 58-year-old male presents to the hospital on 1/1/2024 complaints of worsening right knee pain.  Symptoms started last Thursday and progressively worsened.  Patient did have a aspiration in the orthopedic surgeons office on 12/22/2024.  He was started on steroids.  Admits to pain swelling and redness of the right knee that streaks to the lower leg.  Patient denies fever, chills, diaphoresis, shortness of breath, cough, GI symptoms, or any urinary symptoms.  Patient is status post right knee incision and drainage on 1/2/2024    Review of Systems   Review of Systems   Constitutional: Negative.    HENT: Negative.     Eyes: Negative.    Respiratory: Negative.     Cardiovascular: Negative.    Gastrointestinal: Negative.    Endocrine: Negative.    Genitourinary: Negative.    Musculoskeletal:  Positive for joint swelling.   Skin:  Positive for color change.   Neurological: Negative.    Psychiatric/Behavioral: Negative.     All other systems reviewed and are negative.      Medications  Medications Prior to Admission   Medication Sig Dispense Refill Last Dose    acetaminophen (TYLENOL) 325 MG tablet Take 2 tablets by mouth Every 6 (Six) Hours As Needed for Mild Pain.       amLODIPine (NORVASC) 10 MG tablet Take 1 tablet by  mouth Daily. 90 tablet 3 1/1/2024    aspirin 81 MG chewable tablet Chew 2 tablets Daily.   1/1/2024    benazepril (LOTENSIN) 40 MG tablet Take 1 tablet by mouth Daily. 90 tablet 3 1/1/2024    meloxicam (MOBIC) 15 MG tablet Take 1 tablet by mouth Daily.   12/31/2023    methylPREDNISolone (MEDROL) 4 MG dose pack Take  by mouth 2 (Two) Times a Day. Take as directed on package instructions.   1/1/2024    Multiple Vitamins-Minerals (MULTIVITAMIN ADULT) tablet Take 1 tablet by mouth Daily.   1/1/2024    sulfamethoxazole-trimethoprim (BACTRIM DS,SEPTRA DS) 800-160 MG per tablet Take 1 tablet by mouth 2 (Two) Times a Day.   1/1/2024    traMADol (ULTRAM) 50 MG tablet Take 1 tablet by mouth Every 6 (Six) Hours As Needed for Moderate Pain.          History  Past Medical History:   Diagnosis Date    Hypertension      Past Surgical History:   Procedure Laterality Date    SHOULDER SURGERY      right shoulder-slap tear       Family History  Family History   Problem Relation Age of Onset    Heart disease Mother        Social History   reports that he has never smoked. He has never used smokeless tobacco. He reports that he does not drink alcohol and does not use drugs.    Allergies  Patient has no known allergies.    Objective     Vital Signs   Vital Signs (last 24 hours)         01/01 0700  01/02 0659 01/02 0700  01/02 1614   Most Recent      Temp (°F) 97.6 -  98.8    97.2 -  98.2     97.2 (36.2) 01/02 1609    Heart Rate 70 -  84    75 -  82     76 01/02 1548    Resp 15 -  20    10 -  21     14 01/02 1609    /69 -  153/86    111/71 -  132/76     111/71 01/02 1609    SpO2 (%) 93 -  98    86 -  98     92 01/02 1609    Flow (L/min)   1 -  6     3 01/02 1609            Physical Exam:  Physical Exam  Vitals and nursing note reviewed.   Constitutional:       General: He is not in acute distress.     Appearance: Normal appearance. He is well-developed and normal weight. He is not diaphoretic.   HENT:      Head: Normocephalic and  atraumatic.   Eyes:      Conjunctiva/sclera: Conjunctivae normal.      Pupils: Pupils are equal, round, and reactive to light.   Cardiovascular:      Rate and Rhythm: Normal rate and regular rhythm.      Heart sounds: Normal heart sounds, S1 normal and S2 normal.   Pulmonary:      Effort: Pulmonary effort is normal. No respiratory distress.      Breath sounds: Normal breath sounds. No stridor. No wheezing or rales.   Abdominal:      General: Bowel sounds are normal. There is no distension.      Palpations: Abdomen is soft. There is no mass.      Tenderness: There is no abdominal tenderness. There is no guarding.   Musculoskeletal:         General: No deformity. Normal range of motion.      Cervical back: Neck supple.      Comments: Surgical dressing on right knee   Skin:     General: Skin is warm and dry.      Coloration: Skin is not pale.      Findings: No erythema or rash.   Neurological:      Mental Status: He is alert and oriented to person, place, and time.      Cranial Nerves: No cranial nerve deficit.   Psychiatric:         Mood and Affect: Mood normal.         Microbiology  Microbiology Results (last 10 days)       Procedure Component Value - Date/Time    Wound Culture - Wound, Knee, Right [[id number]] Collected: 01/02/24 1400    Lab Status: Preliminary result Specimen: Wound from Knee, Right Updated: 01/02/24 1530     Gram Stain Moderate (3+) WBCs per low power field      No organisms seen    Blood Culture - Blood, Arm, Right [[id number]]  (Normal) Collected: 01/01/24 0757    Lab Status: Preliminary result Specimen: Blood from Arm, Right Updated: 01/02/24 0815     Blood Culture No growth at 24 hours    Blood Culture - Blood, Arm, Left [[id number]]  (Normal) Collected: 01/01/24 0745    Lab Status: Preliminary result Specimen: Blood from Arm, Left Updated: 01/02/24 0801     Blood Culture No growth at 24 hours    MRSA Screen, PCR (Inpatient) - Swab, Nares [[id number]]  (Normal) Collected: 01/01/24 0745    Lab  Status: Final result Specimen: Swab from Nares Updated: 01/01/24 0907     MRSA PCR No MRSA Detected    Narrative:      The negative predictive value of this diagnostic test is high and should only be used to consider de-escalating anti-MRSA therapy. A positive result may indicate colonization with MRSA and must be correlated clinically.            Laboratory  Results from last 7 days   Lab Units 01/02/24  0432   WBC 10*3/mm3 18.00*   HEMOGLOBIN g/dL 14.5   HEMATOCRIT % 42.6   PLATELETS 10*3/mm3 306     Results from last 7 days   Lab Units 01/02/24  0626   SODIUM mmol/L 134*   POTASSIUM mmol/L 4.5   CHLORIDE mmol/L 103   CO2 mmol/L 24.0   BUN mg/dL 23*   CREATININE mg/dL 0.88   GLUCOSE mg/dL 124*   CALCIUM mg/dL 9.1     Results from last 7 days   Lab Units 01/02/24  0626   SODIUM mmol/L 134*   POTASSIUM mmol/L 4.5   CHLORIDE mmol/L 103   CO2 mmol/L 24.0   BUN mg/dL 23*   CREATININE mg/dL 0.88   GLUCOSE mg/dL 124*   CALCIUM mg/dL 9.1         Results from last 7 days   Lab Units 01/01/24  0745   SED RATE mm/hr 27*           Radiology  Imaging Results (Last 72 Hours)       Procedure Component Value Units Date/Time    XR Knee 3 View Right [[id number]] Collected: 01/01/24 0832     Updated: 01/01/24 0835    Narrative:      XR KNEE 3 VW RIGHT    Date of Exam: 1/1/2024 8:20 AM EST    Indication: recent surg, swelling; recent xr at priority with subsequent arthrocentesis    Comparison: Right knee radiographs dated 12/29/2023    Findings:  There is no acute fracture or dislocation. The joint spaces appear normally aligned and well maintained. There is no osseous erosion or chondrocalcinosis. Bone mineralization is normal. There is a small suprapatellar joint effusion. There is anterior   soft tissue swelling.      Impression:      Impression:  1. No acute osseous abnormality of the right knee.  2. Small suprapatellar joint effusion. Anterior soft tissue swelling.    Electronically Signed: Juan Manuel Vita    1/1/2024 8:33 AM EST     Workstation ID: [id number]            Cardiology      Results Review:  I have reviewed all clinical data, test, lab, and imaging results.       Schedule Meds  amLODIPine, 10 mg, Oral, Daily  [START ON 1/3/2024] aspirin, 81 mg, Oral, BID  cefTRIAXone, 2,000 mg, Intravenous, Daily  dexAMETHasone, 2 mg, Intravenous, Q8H  lisinopril, 40 mg, Oral, Q24H  methocarbamol, 750 mg, Oral, 4x Daily  senna-docusate sodium, 2 tablet, Oral, BID  sodium chloride, 10 mL, Intravenous, Q12H        Infusion Meds  sodium chloride, 1,000 mL        PRN Meds    acetaminophen    senna-docusate sodium **AND** polyethylene glycol **AND** bisacodyl **AND** bisacodyl    HYDROcodone-acetaminophen    ketorolac    nitroglycerin    ondansetron **OR** [DISCONTINUED] ondansetron    [COMPLETED] Insert Peripheral IV **AND** sodium chloride    sodium chloride    sodium chloride    traMADol      Assessment & Plan       Assessment    Right native knee infection in patient who is immunocompetent.  The patient is s/p I&D and washout on January 2, 2024.  Intraoperative cultures are pending    S/p right knee arthroscopy on December 22, 2023.        Plan    Continue IV ceftriaxone 2 g every 24 hours  Start IV Vancomycin- ask pharmacy to monitor and dose  Waiting on interoperative cultures  Continue supportive care  A.m. labs   Case discussed with patient and family member at beside  Case discussed with orthopedic surgery service      Sofia Townsend, APRN  01/02/24  16:14 EST    Note is dictated utilizing voice recognition software/Dragon    Electronically signed by Ginny Crump MD at 01/03/24 1000

## 2024-01-03 NOTE — PLAN OF CARE
Problem: Adult Inpatient Plan of Care  Goal: Plan of Care Review  Outcome: Ongoing, Progressing  Goal: Absence of Hospital-Acquired Illness or Injury  Intervention: Identify and Manage Fall Risk  Recent Flowsheet Documentation  Taken 1/3/2024 1000 by Sergio Oviedo RN  Safety Promotion/Fall Prevention: safety round/check completed  Taken 1/3/2024 0758 by Sergio Oviedo RN  Safety Promotion/Fall Prevention: safety round/check completed  Intervention: Prevent Skin Injury  Recent Flowsheet Documentation  Taken 1/3/2024 0758 by Sergio Oviedo RN  Body Position:   position changed independently   sitting up in bed  Intervention: Prevent and Manage VTE (Venous Thromboembolism) Risk  Recent Flowsheet Documentation  Taken 1/3/2024 0758 by Sergio Oviedo RN  Activity Management: up ad willian  Goal: Optimal Comfort and Wellbeing  Intervention: Provide Person-Centered Care  Recent Flowsheet Documentation  Taken 1/3/2024 0758 by Sergio Oviedo RN  Trust Relationship/Rapport:   care explained   questions encouraged  Goal: Absence of Hospital-Acquired Illness or Injury  Intervention: Identify and Manage Fall Risk  Recent Flowsheet Documentation  Taken 1/3/2024 1000 by Sergio Oviedo RN  Safety Promotion/Fall Prevention: safety round/check completed  Taken 1/3/2024 0758 by Sergio Oviedo RN  Safety Promotion/Fall Prevention: safety round/check completed  Intervention: Prevent Skin Injury  Recent Flowsheet Documentation  Taken 1/3/2024 0758 by Sergio Oviedo RN  Body Position:   position changed independently   sitting up in bed  Intervention: Prevent and Manage VTE (Venous Thromboembolism) Risk  Recent Flowsheet Documentation  Taken 1/3/2024 0758 by Sergio Oviedo RN  Activity Management: up ad willian  Goal: Optimal Comfort and Wellbeing  Intervention: Provide Person-Centered Care  Recent Flowsheet Documentation  Taken 1/3/2024 0758 by Sergio Oviedo RN  Trust Relationship/Rapport:   care explained   questions  encouraged   Goal Outcome Evaluation:         Pt to continue iv antibiotics infectious disease is on board will continue antibiotics at home once discharged

## 2024-01-03 NOTE — PROGRESS NOTES
Infectious Diseases Progress Note      LOS: 0 days   Patient Care Team:  Brian Jones MD as PCP - General    Chief Complaint: Right knee pain    Subjective       The patient has been afebrile for the last 24 hours.  The patient is on room air, hemodynamically stable, and is tolerating antimicrobial therapy.  No new complaints      Review of Systems:   Review of Systems   Constitutional: Negative.    HENT: Negative.     Eyes: Negative.    Respiratory: Negative.     Cardiovascular: Negative.    Gastrointestinal: Negative.    Endocrine: Negative.    Genitourinary: Negative.    Musculoskeletal:  Positive for joint swelling.   Neurological: Negative.    Psychiatric/Behavioral: Negative.     All other systems reviewed and are negative.       Objective     Vital Signs  Temp:  [97.2 °F (36.2 °C)-97.9 °F (36.6 °C)] 97.8 °F (36.6 °C)  Heart Rate:  [60-83] 64  Resp:  [10-16] 16  BP: (111-150)/(64-86) 150/77    Physical Exam:  Physical Exam  Vitals and nursing note reviewed.   Constitutional:       General: He is not in acute distress.     Appearance: Normal appearance. He is well-developed and normal weight. He is not diaphoretic.   HENT:      Head: Normocephalic and atraumatic.   Eyes:      Conjunctiva/sclera: Conjunctivae normal.      Pupils: Pupils are equal, round, and reactive to light.   Cardiovascular:      Rate and Rhythm: Normal rate and regular rhythm.      Heart sounds: Normal heart sounds, S1 normal and S2 normal.   Pulmonary:      Effort: Pulmonary effort is normal. No respiratory distress.      Breath sounds: Normal breath sounds. No stridor. No wheezing or rales.   Abdominal:      General: Bowel sounds are normal. There is no distension.      Palpations: Abdomen is soft. There is no mass.      Tenderness: There is no abdominal tenderness. There is no guarding.   Musculoskeletal:         General: No deformity. Normal range of motion.      Cervical back: Neck supple.      Comments: Dressings on right  knee-no erythema above or below dressings   Skin:     General: Skin is warm and dry.      Coloration: Skin is not pale.      Findings: No erythema or rash.   Neurological:      Mental Status: He is alert and oriented to person, place, and time.      Cranial Nerves: No cranial nerve deficit.   Psychiatric:         Mood and Affect: Mood normal.          Results Review:    I have reviewed all clinical data, test, lab, and imaging results.     Radiology  No Radiology Exams Resulted Within Past 24 Hours    Cardiology    Laboratory    Results from last 7 days   Lab Units 01/03/24  0334 01/02/24  0432 01/01/24  0745   WBC 10*3/mm3 20.40* 18.00* 16.30*   HEMOGLOBIN g/dL 14.1 14.5 14.5   HEMATOCRIT % 41.2 42.6 42.4   PLATELETS 10*3/mm3 330 306 259     Results from last 7 days   Lab Units 01/03/24  0334 01/02/24  0626 01/01/24  0745   SODIUM mmol/L 137 134* 142   POTASSIUM mmol/L 4.9 4.5 4.4   CHLORIDE mmol/L 102 103 106   CO2 mmol/L 26.0 24.0 25.0   BUN mg/dL 31* 23* 23*   CREATININE mg/dL 1.01 0.88 1.16   GLUCOSE mg/dL 178* 124* 105*   CALCIUM mg/dL 9.5 9.1 9.6         Results from last 7 days   Lab Units 01/01/24  0745   SED RATE mm/hr 27*         Microbiology   Microbiology Results (last 10 days)       Procedure Component Value - Date/Time    Wound Culture - Wound, Knee, Right [[id number]]  (Abnormal) Collected: 01/02/24 1402    Lab Status: Preliminary result Specimen: Wound from Knee, Right Updated: 01/03/24 0830     Wound Culture Light growth (2+) Staphylococcus aureus     Gram Stain Moderate (3+) WBCs per low power field      No organisms seen    Wound Culture - Wound, Knee, Right [[id number]]  (Abnormal) Collected: 01/02/24 1400    Lab Status: Preliminary result Specimen: Wound from Knee, Right Updated: 01/03/24 0829     Wound Culture Heavy growth (4+) Staphylococcus aureus     Gram Stain Moderate (3+) WBCs per low power field      No organisms seen    Blood Culture - Blood, Arm, Right [[id number]]  (Normal) Collected:  01/01/24 0757    Lab Status: Preliminary result Specimen: Blood from Arm, Right Updated: 01/03/24 0815     Blood Culture No growth at 2 days    Blood Culture - Blood, Arm, Left [[id number]]  (Normal) Collected: 01/01/24 0745    Lab Status: Preliminary result Specimen: Blood from Arm, Left Updated: 01/03/24 0800     Blood Culture No growth at 2 days    MRSA Screen, PCR (Inpatient) - Swab, Nares [[id number]]  (Normal) Collected: 01/01/24 0745    Lab Status: Final result Specimen: Swab from Nares Updated: 01/01/24 0907     MRSA PCR No MRSA Detected    Narrative:      The negative predictive value of this diagnostic test is high and should only be used to consider de-escalating anti-MRSA therapy. A positive result may indicate colonization with MRSA and must be correlated clinically.            Medication Review:       Schedule Meds  amLODIPine, 10 mg, Oral, Daily  aspirin, 81 mg, Oral, BID  cefTRIAXone, 2,000 mg, Intravenous, Daily  dexAMETHasone, 2 mg, Intravenous, Q8H  enoxaparin, 40 mg, Subcutaneous, Daily  lisinopril, 40 mg, Oral, Q24H  methocarbamol, 750 mg, Oral, 4x Daily  senna-docusate sodium, 2 tablet, Oral, BID  senna-docusate sodium, 2 tablet, Oral, BID  sodium chloride, 10 mL, Intravenous, Q12H  sodium chloride, 10 mL, Intravenous, Q12H  vancomycin, 1,250 mg, Intravenous, Q12H        Infusion Meds  Pharmacy to dose vancomycin,   sodium chloride, 1,000 mL, Last Rate: Stopped (01/03/24 1417)        PRN Meds    acetaminophen    senna-docusate sodium **AND** polyethylene glycol **AND** bisacodyl **AND** bisacodyl    senna-docusate sodium **AND** polyethylene glycol **AND** bisacodyl **AND** bisacodyl    HYDROcodone-acetaminophen    ketorolac    nitroglycerin    ondansetron **OR** [DISCONTINUED] ondansetron    Pharmacy to dose vancomycin    [COMPLETED] Insert Peripheral IV **AND** sodium chloride    sodium chloride    sodium chloride    sodium chloride    sodium chloride    traMADol        Assessment & Plan        Antimicrobial Therapy   1.  IV ceftriaxone        2.  IV vancomycin        3.        4.        5.            Assessment     Right native knee infection in patient who is immunocompetent.  The patient is s/p I&D and washout on January 2, 2024.  Intraoperative cultures are Staphylococcus aureus     S/p right knee arthroscopy on December 22, 2023.      Leukocytosis.  Trending up but likely reactive to surgery.  Patient denies diarrhea        Plan     Continue IV ceftriaxone 2 g every 24 hours  Continue IV Vancomycin- ask pharmacy to monitor and dose-can discontinue if cultures finalized as MSSA  Waiting on interoperative cultures to finalize  Patient will need 6 weeks of IV antibiotics from surgery-last date on 2/12/2023  Patient will need a PICC line before discharge-please remove when IV antibiotics are completed.  Standard weekly PICC line care  Weekly labs CBC, creatinine and sed rate x 6 weeks  Continue supportive care  A.m. labs   Case discussed with patient and family member at beside  Case discussed with case management         Sofia Townsend, APRN  01/03/24  14:26 EST    Note is dictated utilizing voice recognition software/Dragon

## 2024-01-03 NOTE — H&P
[facility]   HISTORY AND PHYSICAL    Patient Name: Damien Avalos  : 1965  MRN: [id number]  Primary Care Physician:  Brian Jones MD  Date of admission: 2024    Subjective   Subjective     Chief Complaint: Lrknee pain started Thursday    HPI:    Damien Avalos is a 58 y.o. male 58-year-old gentleman presented to the hospital with lrknee pain since Thursday evening progressively getting worse despite conservative treatment including rest and elevation  Pain worsening with range of motion  He did have some bodyaches and chills  Patient had aspiration of his left knee by orthopedic and was found to have cloudy aspiration culture  Patient was started on antibiotic and steroids on Friday and was sent to the hospital for increasing pain and swelling  Patient has been followed up per infectious disease and is on IV antibiotic    Review of Systems   Negative and for left knee pain  Generalized body aches  Chills  Most of the symptoms are resolved at this point rest of 14 system reviewed and negative    Personal History     Past Medical History:   Diagnosis Date    Hypertension        Past Surgical History:   Procedure Laterality Date    KNEE ARTHROSCOPY Right 2024    Procedure: KNEE ARTHROSCOPY;  Surgeon: Casey Kwan MD;  Location: [facility];  Service: Orthopedics;  Laterality: Right;    KNEE INCISION AND DRAINAGE Right 2024    Procedure: KNEE INCISION AND DRAINAGE;  Surgeon: Casey Kwan MD;  Location: [facility];  Service: Orthopedics;  Laterality: Right;    SHOULDER SURGERY      right shoulder-slap tear       Family History: family history includes Heart disease in his mother. Otherwise pertinent FHx was reviewed and not pertinent to current issue.    Social History:  reports that he has never smoked. He has never used smokeless tobacco. He reports that he does not drink alcohol and does not use drugs.    Home Medications:  acetaminophen, amLODIPine,  aspirin, benazepril, meloxicam, methylPREDNISolone, multivitamin with minerals, sulfamethoxazole-trimethoprim, and traMADol    Allergies:  No Known Allergies    Objective   Objective     Vitals:   Temp:  [97.2 °F (36.2 °C)-97.9 °F (36.6 °C)] 97.9 °F (36.6 °C)  Heart Rate:  [60-83] 64  Resp:  [10-16] 16  BP: (111-143)/(64-86) 143/86  Flow (L/min):  [1-6] 2  Physical Exam    Constitutional: Awake, alert   Eyes: PERRLA, sclerae anicteric, no conjunctival injection   HENT: NCAT, mucous membranes moist   Neck: Supple, no thyromegaly, no lymphadenopathy, trachea midline   Respiratory: Clear to auscultation bilaterally, nonlabored respirations    Cardiovascular: RRR, no murmurs, rubs, or gallops, palpable pedal pulses bilaterally   Gastrointestinal: Positive bowel sounds, soft, nontender, nondistended   Musculoskeletal: No bilateral ankle edema, no clubbing or cyanosis to extremities   Psychiatric: Appropriate affect, cooperative   Neurologic: Oriented x 3, strength symmetric in all extremities, Cranial Nerves grossly intact to confrontation, speech clear   Skin: No rashes     Result Review    Result Review:  I have personally reviewed the results from the time of this admission to 1/3/2024 14:13 EST and agree with these findings:  [x]  Laboratory list / accordion  []  Microbiology  []  Radiology  []  EKG/Telemetry   []  Cardiology/Vascular   []  Pathology  []  Old records  []  Other:  Most notable findings include:       Assessment & Plan   Assessment / Plan     Brief Patient Summary:  Damien Avalos is a 58 y.o. male who is admitted with lrknee pain  Patient status post right knee arthroscopy and irrigation debridement and synovectomy    Patient has been followed up per infectious disease and orthopedic  Active Hospital Problems:  Active Hospital Problems    Diagnosis     **Cellulitis      Plan:   Patient with right knee infection  He is immunocompetent  Status post I&D and washout on January 2  Intraoperative cultures  are pending  Patient status post right knee arthroscopy on December 2 due to meniscal tear  Continue IV ceftriaxone 2 g IV every 24 hours  Start IV vancomycin  Awaiting on intraoperative culture  Follow-up per ID and orthopedic    DVT prophylaxis:  Medical and mechanical DVT prophylaxis orders are present.    CODE STATUS:    Code Status (Patient has no pulse and is not breathing): CPR (Attempt to Resuscitate)  Medical Interventions (Patient has pulse or is breathing): Full Support    Admission Status:  I believe this patient meets ip status.    Trang Mann MD

## 2024-01-03 NOTE — PLAN OF CARE
Problem: Adult Inpatient Plan of Care  Goal: Plan of Care Review  Outcome: Ongoing, Progressing  Flowsheets (Taken 1/3/2024 0152)  Outcome Evaluation: pt states knee feels much improved  Goal: Patient-Specific Goal (Individualized)  Outcome: Ongoing, Progressing  Goal: Absence of Hospital-Acquired Illness or Injury  Outcome: Ongoing, Progressing  Intervention: Identify and Manage Fall Risk  Recent Flowsheet Documentation  Taken 1/3/2024 0000 by Swathi Wilson RN  Safety Promotion/Fall Prevention:   safety round/check completed   nonskid shoes/slippers when out of bed  Taken 1/2/2024 2200 by Swathi Wilson RN  Safety Promotion/Fall Prevention: safety round/check completed  Taken 1/2/2024 2000 by Swathi Wilson RN  Safety Promotion/Fall Prevention: safety round/check completed  Taken 1/2/2024 1935 by Swathi Wilson RN  Safety Promotion/Fall Prevention:   safety round/check completed   nonskid shoes/slippers when out of bed  Intervention: Prevent Skin Injury  Recent Flowsheet Documentation  Taken 1/2/2024 1935 by Swathi Wilson RN  Body Position:   sitting up in bed   position changed independently  Goal: Optimal Comfort and Wellbeing  Outcome: Ongoing, Progressing  Goal: Readiness for Transition of Care  Outcome: Ongoing, Progressing  Goal: Plan of Care Review  Outcome: Ongoing, Progressing  Flowsheets (Taken 1/3/2024 0152)  Outcome Evaluation: pt states knee feels much improved  Goal: Patient-Specific Goal (Individualized)  Outcome: Ongoing, Progressing  Goal: Absence of Hospital-Acquired Illness or Injury  Outcome: Ongoing, Progressing  Intervention: Identify and Manage Fall Risk  Recent Flowsheet Documentation  Taken 1/3/2024 0000 by Swathi Wilson RN  Safety Promotion/Fall Prevention:   safety round/check completed   nonskid shoes/slippers when out of bed  Taken 1/2/2024 2200 by Swathi Wilson RN  Safety Promotion/Fall Prevention: safety round/check completed  Taken 1/2/2024 2000 by Swathi Wilson  RN  Safety Promotion/Fall Prevention: safety round/check completed  Taken 1/2/2024 1935 by Swathi Wilson RN  Safety Promotion/Fall Prevention:   safety round/check completed   nonskid shoes/slippers when out of bed  Intervention: Prevent Skin Injury  Recent Flowsheet Documentation  Taken 1/2/2024 1935 by Swathi Wilson, RN  Body Position:   sitting up in bed   position changed independently  Goal: Optimal Comfort and Wellbeing  Outcome: Ongoing, Progressing  Goal: Readiness for Transition of Care  Outcome: Ongoing, Progressing   Goal Outcome Evaluation:              Outcome Evaluation: pt states knee feels much improved

## 2024-01-04 PROBLEM — M00.9 INFECTION OF RIGHT KNEE: Status: ACTIVE | Noted: 2024-01-04

## 2024-01-04 LAB
ANION GAP SERPL CALCULATED.3IONS-SCNC: 10 MMOL/L (ref 5–15)
BACTERIA SPEC AEROBE CULT: ABNORMAL
BACTERIA SPEC AEROBE CULT: ABNORMAL
BASOPHILS # BLD AUTO: 0 10*3/MM3 (ref 0–0.2)
BASOPHILS NFR BLD AUTO: 0.3 % (ref 0–1.5)
BUN SERPL-MCNC: 26 MG/DL (ref 6–20)
BUN/CREAT SERPL: 28 (ref 7–25)
CALCIUM SPEC-SCNC: 9.3 MG/DL (ref 8.6–10.5)
CHLORIDE SERPL-SCNC: 103 MMOL/L (ref 98–107)
CO2 SERPL-SCNC: 26 MMOL/L (ref 22–29)
CREAT SERPL-MCNC: 0.93 MG/DL (ref 0.76–1.27)
DEPRECATED RDW RBC AUTO: 43.3 FL (ref 37–54)
EGFRCR SERPLBLD CKD-EPI 2021: 95.2 ML/MIN/1.73
EOSINOPHIL # BLD AUTO: 0 10*3/MM3 (ref 0–0.4)
EOSINOPHIL NFR BLD AUTO: 0.2 % (ref 0.3–6.2)
ERYTHROCYTE [DISTWIDTH] IN BLOOD BY AUTOMATED COUNT: 13.5 % (ref 12.3–15.4)
GLUCOSE SERPL-MCNC: 110 MG/DL (ref 65–99)
GRAM STN SPEC: ABNORMAL
HCT VFR BLD AUTO: 42.8 % (ref 37.5–51)
HGB BLD-MCNC: 14.3 G/DL (ref 13–17.7)
LYMPHOCYTES # BLD AUTO: 1.7 10*3/MM3 (ref 0.7–3.1)
LYMPHOCYTES NFR BLD AUTO: 11.2 % (ref 19.6–45.3)
MAGNESIUM SERPL-MCNC: 2.2 MG/DL (ref 1.6–2.6)
MCH RBC QN AUTO: 30.7 PG (ref 26.6–33)
MCHC RBC AUTO-ENTMCNC: 33.5 G/DL (ref 31.5–35.7)
MCV RBC AUTO: 91.7 FL (ref 79–97)
MONOCYTES # BLD AUTO: 1 10*3/MM3 (ref 0.1–0.9)
MONOCYTES NFR BLD AUTO: 6.4 % (ref 5–12)
NEUTROPHILS NFR BLD AUTO: 12.4 10*3/MM3 (ref 1.7–7)
NEUTROPHILS NFR BLD AUTO: 81.9 % (ref 42.7–76)
NRBC BLD AUTO-RTO: 0 /100 WBC (ref 0–0.2)
PLATELET # BLD AUTO: 360 10*3/MM3 (ref 140–450)
PMV BLD AUTO: 7.8 FL (ref 6–12)
POTASSIUM SERPL-SCNC: 4.4 MMOL/L (ref 3.5–5.2)
POTASSIUM SERPL-SCNC: 4.6 MMOL/L (ref 3.5–5.2)
RBC # BLD AUTO: 4.66 10*6/MM3 (ref 4.14–5.8)
SODIUM SERPL-SCNC: 139 MMOL/L (ref 136–145)
VANCOMYCIN PEAK SERPL-MCNC: 13 MCG/ML (ref 20–40)
VANCOMYCIN TROUGH SERPL-MCNC: 8.2 MCG/ML (ref 5–20)
WBC NRBC COR # BLD AUTO: 15.2 10*3/MM3 (ref 3.4–10.8)

## 2024-01-04 PROCEDURE — 25010000002 KETOROLAC TROMETHAMINE PER 15 MG: Performed by: NURSE PRACTITIONER

## 2024-01-04 PROCEDURE — 25810000003 SODIUM CHLORIDE 0.9 % SOLUTION 250 ML FLEX CONT: Performed by: NURSE PRACTITIONER

## 2024-01-04 PROCEDURE — 80202 ASSAY OF VANCOMYCIN: CPT | Performed by: NURSE PRACTITIONER

## 2024-01-04 PROCEDURE — 80202 ASSAY OF VANCOMYCIN: CPT | Performed by: INTERNAL MEDICINE

## 2024-01-04 PROCEDURE — 25010000002 ENOXAPARIN PER 10 MG: Performed by: INTERNAL MEDICINE

## 2024-01-04 PROCEDURE — 25010000002 CEFTRIAXONE PER 250 MG: Performed by: NURSE PRACTITIONER

## 2024-01-04 PROCEDURE — 80048 BASIC METABOLIC PNL TOTAL CA: CPT | Performed by: NURSE PRACTITIONER

## 2024-01-04 PROCEDURE — C1751 CATH, INF, PER/CENT/MIDLINE: HCPCS

## 2024-01-04 PROCEDURE — 25010000002 DEXAMETHASONE PER 1 MG: Performed by: NURSE PRACTITIONER

## 2024-01-04 PROCEDURE — 85025 COMPLETE CBC W/AUTO DIFF WBC: CPT | Performed by: NURSE PRACTITIONER

## 2024-01-04 PROCEDURE — 99222 1ST HOSP IP/OBS MODERATE 55: CPT | Performed by: INTERNAL MEDICINE

## 2024-01-04 PROCEDURE — 25010000002 VANCOMYCIN HCL 1.25 G RECONSTITUTED SOLUTION 1 EACH VIAL: Performed by: NURSE PRACTITIONER

## 2024-01-04 PROCEDURE — 84132 ASSAY OF SERUM POTASSIUM: CPT | Performed by: INTERNAL MEDICINE

## 2024-01-04 PROCEDURE — 83735 ASSAY OF MAGNESIUM: CPT | Performed by: INTERNAL MEDICINE

## 2024-01-04 PROCEDURE — 02HV33Z INSERTION OF INFUSION DEVICE INTO SUPERIOR VENA CAVA, PERCUTANEOUS APPROACH: ICD-10-PCS | Performed by: INTERNAL MEDICINE

## 2024-01-04 RX ORDER — ASPIRIN 81 MG/1
81 TABLET ORAL 2 TIMES DAILY
Status: DISCONTINUED | OUTPATIENT
Start: 2024-01-04 | End: 2024-01-05 | Stop reason: HOSPADM

## 2024-01-04 RX ADMIN — ASPIRIN 81 MG: 81 TABLET, COATED ORAL at 09:00

## 2024-01-04 RX ADMIN — KETOROLAC TROMETHAMINE 15 MG: 30 INJECTION, SOLUTION INTRAMUSCULAR; INTRAVENOUS at 01:48

## 2024-01-04 RX ADMIN — ENOXAPARIN SODIUM 40 MG: 100 INJECTION SUBCUTANEOUS at 15:31

## 2024-01-04 RX ADMIN — DEXAMETHASONE SODIUM PHOSPHATE 2 MG: 4 INJECTION, SOLUTION INTRAMUSCULAR; INTRAVENOUS at 09:00

## 2024-01-04 RX ADMIN — METHOCARBAMOL 750 MG: 500 TABLET ORAL at 09:00

## 2024-01-04 RX ADMIN — ASPIRIN 81 MG: 81 TABLET, COATED ORAL at 20:27

## 2024-01-04 RX ADMIN — METHOCARBAMOL 750 MG: 500 TABLET ORAL at 20:27

## 2024-01-04 RX ADMIN — Medication 10 ML: at 01:51

## 2024-01-04 RX ADMIN — DEXAMETHASONE SODIUM PHOSPHATE 2 MG: 4 INJECTION, SOLUTION INTRAMUSCULAR; INTRAVENOUS at 01:48

## 2024-01-04 RX ADMIN — LISINOPRIL 40 MG: 20 TABLET ORAL at 09:00

## 2024-01-04 RX ADMIN — METHOCARBAMOL 750 MG: 500 TABLET ORAL at 17:47

## 2024-01-04 RX ADMIN — DEXAMETHASONE SODIUM PHOSPHATE 2 MG: 4 INJECTION, SOLUTION INTRAMUSCULAR; INTRAVENOUS at 17:47

## 2024-01-04 RX ADMIN — VANCOMYCIN HYDROCHLORIDE 1250 MG: 1.25 INJECTION, POWDER, LYOPHILIZED, FOR SOLUTION INTRAVENOUS at 05:59

## 2024-01-04 RX ADMIN — ACETAMINOPHEN 650 MG: 325 TABLET, FILM COATED ORAL at 15:38

## 2024-01-04 RX ADMIN — CEFTRIAXONE 2000 MG: 2 INJECTION, POWDER, FOR SOLUTION INTRAMUSCULAR; INTRAVENOUS at 08:58

## 2024-01-04 RX ADMIN — METHOCARBAMOL 750 MG: 500 TABLET ORAL at 12:03

## 2024-01-04 RX ADMIN — AMLODIPINE BESYLATE 10 MG: 5 TABLET ORAL at 09:00

## 2024-01-04 NOTE — CONSULTS
Picc team consult;    Procedure explained to patient and agrees to proceed.  Informed consent obtained.  Time out performed.  Picc line placed RUE cephalic vessel utilizing US guidance and modified seldinger technique with easily compressible vessel without difficulty.

## 2024-01-04 NOTE — DISCHARGE PLACEMENT REQUEST
"Damien Avalos (58 y.o. Male)       Date of Birth   1965    Social Security Number       Address   [address] [facility] [place] [facility]    Home Phone   [phone]    MRN   [id number]       [employer]   [employer]    Marital Status                               Admission Date   1/1/24    Admission Type   Emergency    Admitting Provider   Trang Mann MD    Attending Provider   Trang Mann MD    Department, Room/Bed   [facility] OBSERVATION, 103/1       Discharge Date       Discharge Disposition       Discharge Destination                                 Attending Provider: Trang Mann MD    Allergies: No Known Allergies    Isolation: None   Infection: None   Code Status: CPR    Ht: 172.7 cm (68\")   Wt: 105 kg (231 lb)    Admission Cmt: None   Principal Problem: Cellulitis [L03.90]                   Active Insurance as of 1/1/2024       Primary Coverage       Payor Plan Insurance Group Employer/Plan Group    [employer] PPO [id number]       Payor Plan Address Payor Plan Phone Number Payor Plan Fax Number Effective Dates    [address]   1/1/2023 - None Entered    [facility] [id number]         Subscriber Name Subscriber Birth Date Member ID       MADAY AVALOS 1965 [id number]                     Emergency Contacts        (Rel.) Home Phone Work Phone Mobile Phone    MADAY AVALOS (Spouse) [phone] -- --                "

## 2024-01-04 NOTE — PROGRESS NOTES
Infectious Diseases Progress Note      LOS: 1 day   Patient Care Team:  Brian Jones MD as PCP - General    Chief Complaint: Right knee pain    Subjective       The patient has been afebrile for the last 24 hours.  The patient is on room air, hemodynamically stable, and is tolerating antimicrobial therapy.  No new complaints.  Patient is ready to go home      Review of Systems:   Review of Systems   Constitutional: Negative.    HENT: Negative.     Eyes: Negative.    Respiratory: Negative.     Cardiovascular: Negative.    Gastrointestinal: Negative.    Endocrine: Negative.    Genitourinary: Negative.    Musculoskeletal:  Positive for joint swelling.   Neurological: Negative.    Psychiatric/Behavioral: Negative.     All other systems reviewed and are negative.       Objective     Vital Signs  Temp:  [97.3 °F (36.3 °C)-98.1 °F (36.7 °C)] 98 °F (36.7 °C)  Heart Rate:  [60-90] 70  Resp:  [14-20] 16  BP: (124-146)/(76-85) 132/85    Physical Exam:  Physical Exam  Vitals and nursing note reviewed.   Constitutional:       General: He is not in acute distress.     Appearance: Normal appearance. He is well-developed and normal weight. He is not diaphoretic.   HENT:      Head: Normocephalic and atraumatic.   Eyes:      Conjunctiva/sclera: Conjunctivae normal.      Pupils: Pupils are equal, round, and reactive to light.   Cardiovascular:      Rate and Rhythm: Normal rate and regular rhythm.      Heart sounds: Normal heart sounds, S1 normal and S2 normal.   Pulmonary:      Effort: Pulmonary effort is normal. No respiratory distress.      Breath sounds: Normal breath sounds. No stridor. No wheezing or rales.   Abdominal:      General: Bowel sounds are normal. There is no distension.      Palpations: Abdomen is soft. There is no mass.      Tenderness: There is no abdominal tenderness. There is no guarding.   Musculoskeletal:         General: No deformity. Normal range of motion.      Cervical back: Neck supple.       Comments: Dressings on right knee-no erythema above or below dressings   Skin:     General: Skin is warm and dry.      Coloration: Skin is not pale.      Findings: No erythema or rash.   Neurological:      Mental Status: He is alert and oriented to person, place, and time.      Cranial Nerves: No cranial nerve deficit.   Psychiatric:         Mood and Affect: Mood normal.          Results Review:    I have reviewed all clinical data, test, lab, and imaging results.     Radiology  No Radiology Exams Resulted Within Past 24 Hours    Cardiology    Laboratory    Results from last 7 days   Lab Units 01/04/24  0521 01/03/24  0334 01/02/24  0432 01/01/24  0745   WBC 10*3/mm3 15.20* 20.40* 18.00* 16.30*   HEMOGLOBIN g/dL 14.3 14.1 14.5 14.5   HEMATOCRIT % 42.8 41.2 42.6 42.4   PLATELETS 10*3/mm3 360 330 306 259     Results from last 7 days   Lab Units 01/04/24  1048 01/04/24  0016 01/03/24  0334 01/02/24  0626 01/01/24  0745   SODIUM mmol/L  --  139 137 134* 142   POTASSIUM mmol/L 4.6 4.4 4.9 4.5 4.4   CHLORIDE mmol/L  --  103 102 103 106   CO2 mmol/L  --  26.0 26.0 24.0 25.0   BUN mg/dL  --  26* 31* 23* 23*   CREATININE mg/dL  --  0.93 1.01 0.88 1.16   GLUCOSE mg/dL  --  110* 178* 124* 105*   CALCIUM mg/dL  --  9.3 9.5 9.1 9.6         Results from last 7 days   Lab Units 01/01/24  0745   SED RATE mm/hr 27*         Microbiology   Microbiology Results (last 10 days)       Procedure Component Value - Date/Time    Wound Culture - Wound, Knee, Right [[id number]]  (Abnormal) Collected: 01/02/24 1402    Lab Status: Final result Specimen: Wound from Knee, Right Updated: 01/04/24 0715     Wound Culture Light growth (2+) Staphylococcus aureus     Gram Stain Moderate (3+) WBCs per low power field      No organisms seen    Narrative:      Refer to previous wound culture collected on01/02/2024 1400 for MICs       Wound Culture - Wound, Knee, Right [[id number]]  (Abnormal)  (Susceptibility) Collected: 01/02/24 1400    Lab Status: Final  result Specimen: Wound from Knee, Right Updated: 01/04/24 0715     Wound Culture Heavy growth (4+) Staphylococcus aureus     Gram Stain Moderate (3+) WBCs per low power field      No organisms seen    Susceptibility        Staphylococcus aureus      TK      Clindamycin Resistant      Erythromycin Resistant      Oxacillin Susceptible      Rifampin Susceptible      Tetracycline Susceptible      Trimethoprim + Sulfamethoxazole Susceptible      Vancomycin Susceptible                       Susceptibility Comments       Staphylococcus aureus    This isolate is presumed to be clindamycin resistant based on detection of inducible clindamycin resistance.  Clindamycin may still be effective in some patients.               Blood Culture - Blood, Arm, Right [[id number]]  (Normal) Collected: 01/01/24 0757    Lab Status: Preliminary result Specimen: Blood from Arm, Right Updated: 01/04/24 0801     Blood Culture No growth at 3 days    Blood Culture - Blood, Arm, Left [[id number]]  (Normal) Collected: 01/01/24 0745    Lab Status: Preliminary result Specimen: Blood from Arm, Left Updated: 01/04/24 0801     Blood Culture No growth at 3 days    MRSA Screen, PCR (Inpatient) - Swab, Nares [[id number]]  (Normal) Collected: 01/01/24 0745    Lab Status: Final result Specimen: Swab from Nares Updated: 01/01/24 0907     MRSA PCR No MRSA Detected    Narrative:      The negative predictive value of this diagnostic test is high and should only be used to consider de-escalating anti-MRSA therapy. A positive result may indicate colonization with MRSA and must be correlated clinically.            Medication Review:       Schedule Meds  amLODIPine, 10 mg, Oral, Daily  aspirin, 81 mg, Oral, BID  cefTRIAXone, 2,000 mg, Intravenous, Daily  dexAMETHasone, 2 mg, Intravenous, Q8H  enoxaparin, 40 mg, Subcutaneous, Daily  lisinopril, 40 mg, Oral, Q24H  methocarbamol, 750 mg, Oral, 4x Daily  senna-docusate sodium, 2 tablet, Oral, BID  senna-docusate sodium, 2  tablet, Oral, BID  sodium chloride, 10 mL, Intravenous, Q12H  sodium chloride, 10 mL, Intravenous, Q12H        Infusion Meds  sodium chloride, 1,000 mL, Last Rate: Stopped (01/03/24 1417)        PRN Meds    acetaminophen    senna-docusate sodium **AND** polyethylene glycol **AND** bisacodyl **AND** bisacodyl    senna-docusate sodium **AND** polyethylene glycol **AND** bisacodyl **AND** bisacodyl    HYDROcodone-acetaminophen    ketorolac    nitroglycerin    ondansetron **OR** [DISCONTINUED] ondansetron    [COMPLETED] Insert Peripheral IV **AND** sodium chloride    sodium chloride    sodium chloride    sodium chloride    sodium chloride    traMADol        Assessment & Plan       Antimicrobial Therapy   1.  IV ceftriaxone        2.  IV vancomycin        3.        4.        5.            Assessment     Right native knee infection in patient who is immunocompetent.  The patient is s/p I&D and washout on January 2, 2024.  Intraoperative cultures are methicillin susceptible Staphylococcus aureus     S/p right knee arthroscopy on December 22, 2023.      Leukocytosis.  Trending up but likely reactive to surgery.  Patient denies diarrhea        Plan     Continue IV ceftriaxone 2 g every 24 hours-patient will need 6 weeks of antimicrobial therapy-last day on 2/12/2023  Patient will need a PICC line before discharge-please remove when IV antibiotics are completed.  Standard weekly PICC line care  Weekly labs CBC, creatinine and sed rate x 6 weeks  Continue supportive care  Case discussed with case management  Case discussed with patient and family member at [facility]  Ambulatory care orders for labs and PICC line care been entered  Not much more to add from infectious disease standpoint-we will sign off at this time-please call with any questions.    Please fax all post discharge lab results, imaging studies and correspondence to this fax number [phone]  For any question or concern please contact our service number [phone]  [phone]         Sofia Townsend, APRN  01/04/24  15:14 EST    Note is dictated utilizing voice recognition software/Dragon

## 2024-01-04 NOTE — PLAN OF CARE
Problem: Pain Acute  Goal: Acceptable Pain Control and Functional Ability  Intervention: Optimize Psychosocial Wellbeing  Recent Flowsheet Documentation  Taken 1/3/2024 2000 by Km Castle RN  Supportive Measures: active listening utilized   Goal Outcome Evaluation:  Plan of Care Reviewed With: patient        Progress: no change  Outcome Evaluation: Pt admitted to Observation for cellulitis RLE. Pt is receiving vancomycin and Rocephin IV. No significant acue changes. Care continued.

## 2024-01-04 NOTE — CASE MANAGEMENT/SOCIAL WORK
Continued Stay Note  CAROLYNN Mendenhall     Patient Name: Damien Avalos  MRN: [id number]  Today's Date: 1/4/2024    Admit Date: 1/1/2024    Plan: Home w/ IV abx. [facility]erral to [facility]   Discharge Plan       Row Name 01/04/24 0825       Plan    Plan Home w/ IV abx. [facility]    Plan Comments Patient will need 6 weeks of IV antibiotics from surgery-last date on 2/12/2023. CM met with patient at the bedside with infusion agency list and was given the choice of [facility]. Patient and wife agreeable to administer the abx on their own, no need for home health. Patient will need to come to ambulatory for weekly dressing changes and labs.                  Alfonso Randall RN     Cell number [phone]  Office number [phone]

## 2024-01-04 NOTE — PROGRESS NOTES
ORTHO PROGRESS NOTE      Patient: Damien Avalos    Date of Admission: 1/1/2024  7:01 AM    YOB: 1965    Medical Record Number: [id number]    Attending Physician: Trang Mann MD        Status post surgery   Patient states knee feels good.  Hoping to go home today    Systemic or Specific Complaints:no complaints        Allergies: No Known Allergies    Medications:   Current Medications:  Scheduled Meds:amLODIPine, 10 mg, Oral, Daily  aspirin, 81 mg, Oral, BID  cefTRIAXone, 2,000 mg, Intravenous, Daily  dexAMETHasone, 2 mg, Intravenous, Q8H  enoxaparin, 40 mg, Subcutaneous, Daily  lisinopril, 40 mg, Oral, Q24H  methocarbamol, 750 mg, Oral, 4x Daily  senna-docusate sodium, 2 tablet, Oral, BID  senna-docusate sodium, 2 tablet, Oral, BID  sodium chloride, 10 mL, Intravenous, Q12H  sodium chloride, 10 mL, Intravenous, Q12H  vancomycin, 1,250 mg, Intravenous, Q12H      Continuous Infusions:Pharmacy to dose vancomycin,   sodium chloride, 1,000 mL, Last Rate: Stopped (01/03/24 1417)      PRN Meds:.  acetaminophen    senna-docusate sodium **AND** polyethylene glycol **AND** bisacodyl **AND** bisacodyl    senna-docusate sodium **AND** polyethylene glycol **AND** bisacodyl **AND** bisacodyl    HYDROcodone-acetaminophen    ketorolac    nitroglycerin    ondansetron **OR** [DISCONTINUED] ondansetron    Pharmacy to dose vancomycin    [COMPLETED] Insert Peripheral IV **AND** sodium chloride    sodium chloride    sodium chloride    sodium chloride    sodium chloride    traMADol      Physical Exam: 58 y.o. male  General Appearance:                Pain Relief: Patient reports      Vitals:    01/03/24 1738 01/03/24 2233 01/04/24 0150 01/04/24 0609   BP: 146/80 133/76 124/82 125/84   BP Location: Right arm Right arm Right arm Right arm   Patient Position: Lying Lying Lying Lying   Pulse:  66 90 60   Resp: 16 14 16 18   Temp: 97.4 °F (36.3 °C) 98 °F (36.7 °C) 97.5 °F (36.4 °C) 97.3 °F (36.3 °C)   TempSrc: Oral Oral  Oral Temporal   SpO2: 97% 97% 94% 94%   Weight:       Height:              HEENT:    Normocephalic, without obvious abnormality, atraumatic.          PERRLA; EOMI; Neck supple with trachea midline. No JVD.    No lymphadenopathy     Lungs:     Clear to auscultation,respirations regular, even and                   Unlabored      Heart:    Regular rhythm and normal rate, normal S1 and S2, no            murmur, no gallop, no rub, no click     Abdomen:     Normal bowel sounds, no masses, no organomegaly, soft        non-tender, non-distended, no guarding, no rebound                 tenderness       Extremities:   Operative extremity neurovascular status intact. ROM intact.    Incision intact w/out signs or  symptoms of infection. No           edema, no cyanosis, no calf tenderness  No sign or symptom of dvt/pe     Pulses:     Pulses palpable and equal bilaterally     Skin:     Skin Warm/Dry w/out ulceration, ecchymosis, rash, or   cyanosis     Activity: Mobilizing Per P.T.   Weight Bearing: As Tolerated    Diagnostic Tests:   [unfilled]      [unfilled]    Assessment:  Status post surgery  Status post arthroscopy with debridement and  synovectomy for right knee septic arthritis  Overall he looks really good.  White count trending down cultures back    Plan:    PICC line order placed  Patient will follow-up in 2 weeks in our office  Antibiotics per infectious disease recommendation  Aspirin twice daily for DVT prophylaxis      Discharge Plan: continue to work on discharge    Date: [unfilled] Time: 08:21 JORGE Kwan MD

## 2024-01-04 NOTE — PLAN OF CARE
Problem: Adult Inpatient Plan of Care  Goal: Plan of Care Review  Outcome: Ongoing, Progressing  Goal: Patient-Specific Goal (Individualized)  Outcome: Ongoing, Progressing  Goal: Absence of Hospital-Acquired Illness or Injury  Outcome: Ongoing, Progressing  Intervention: Identify and Manage Fall Risk  Recent Flowsheet Documentation  Taken 1/4/2024 1200 by Sergio Oviedo RN  Safety Promotion/Fall Prevention: safety round/check completed  Taken 1/4/2024 1000 by Sergio Oviedo RN  Safety Promotion/Fall Prevention: safety round/check completed  Taken 1/4/2024 0733 by Sergio Oviedo RN  Safety Promotion/Fall Prevention: safety round/check completed  Intervention: Prevent Skin Injury  Recent Flowsheet Documentation  Taken 1/4/2024 0733 by Sergio Oviedo RN  Body Position: position changed independently  Intervention: Prevent and Manage VTE (Venous Thromboembolism) Risk  Recent Flowsheet Documentation  Taken 1/4/2024 0733 by Sergio Oviedo RN  Activity Management:   sitting, edge of bed   up ad willian  Goal: Optimal Comfort and Wellbeing  Outcome: Ongoing, Progressing  Goal: Readiness for Transition of Care  Outcome: Ongoing, Progressing  Goal: Plan of Care Review  Outcome: Ongoing, Progressing  Goal: Patient-Specific Goal (Individualized)  Outcome: Ongoing, Progressing  Goal: Absence of Hospital-Acquired Illness or Injury  Outcome: Ongoing, Progressing  Intervention: Identify and Manage Fall Risk  Recent Flowsheet Documentation  Taken 1/4/2024 1200 by Sergio Oviedo RN  Safety Promotion/Fall Prevention: safety round/check completed  Taken 1/4/2024 1000 by Sergio Oviedo RN  Safety Promotion/Fall Prevention: safety round/check completed  Taken 1/4/2024 0733 by Sergio Oviedo RN  Safety Promotion/Fall Prevention: safety round/check completed  Intervention: Prevent Skin Injury  Recent Flowsheet Documentation  Taken 1/4/2024 0733 by Sergio Oviedo RN  Body Position: position changed  independently  Intervention: Prevent and Manage VTE (Venous Thromboembolism) Risk  Recent Flowsheet Documentation  Taken 1/4/2024 6317 by Sergio Oviedo, RN  Activity Management:   sitting, edge of bed   up ad willian  Goal: Optimal Comfort and Wellbeing  Outcome: Ongoing, Progressing  Goal: Readiness for Transition of Care  Outcome: Ongoing, Progressing   Goal Outcome Evaluation:      Pt continuing iv antibiotics awaiting picc line to continue antibiotics at home. Pt has had spells of vtach and bradycardia potassium and mag done came back normal will continue to monitor

## 2024-01-04 NOTE — CONSULTS
CARDIOLOGY CONSULT:    Damien Avalos  1965  male  [id number]      Referring Provider: Hospitalist  Reason for Consultation: Cellulitis and V. tach    Patient Care Team:  Brian Jones MD as PCP - General    Chief complaint cellulitis and V. tach    Subjective .     History of present illness:  Damien Avalos is a 58 y.o. male with history of hypertension and recent knee surgery status post infection presented to the hospital with complaints of swelling of the knee with redness and was noted to have cellulitis.  Infectious disease outpatient and placed him on IV antibiotics but in the meantime he had an episode of nonsustained ventricular tachycardia and hence a cardiology consult is called.  Patient does not have any symptoms of chest pain or shortness of breath.  No complains of any PND orthopnea.  No palpitation dizziness syncope.    Review of Systems   Constitutional: Negative for fever and malaise/fatigue.   HENT:  Negative for ear pain and nosebleeds.    Eyes:  Negative for blurred vision and double vision.   Cardiovascular:  Negative for chest pain, dyspnea on exertion and palpitations.   Respiratory:  Negative for cough and shortness of breath.    Skin:  Negative for rash.   Musculoskeletal:  Negative for joint pain.   Gastrointestinal:  Negative for abdominal pain, nausea and vomiting.   Neurological:  Negative for focal weakness and headaches.   Psychiatric/Behavioral:  Negative for depression. The patient is not nervous/anxious.    All other systems reviewed and are negative.      History  Past Medical History:   Diagnosis Date    Hypertension        Past Surgical History:   Procedure Laterality Date    KNEE ARTHROSCOPY Right 1/2/2024    Procedure: KNEE ARTHROSCOPY;  Surgeon: Casey Kwan MD;  Location: [facility] OR;  Service: Orthopedics;  Laterality: Right;    KNEE INCISION AND DRAINAGE Right 1/2/2024    Procedure: KNEE INCISION AND DRAINAGE;  Surgeon: Casey Kwan,  MD;  Location: [facility] MAIN OR;  Service: Orthopedics;  Laterality: Right;    SHOULDER SURGERY      right shoulder-slap tear       Family History   Problem Relation Age of Onset    Heart disease Mother        Social History     Tobacco Use    Smoking status: Never    Smokeless tobacco: Never   Vaping Use    Vaping Use: Never used   Substance Use Topics    Alcohol use: Never    Drug use: Never        Medications Prior to Admission   Medication Sig Dispense Refill Last Dose    acetaminophen (TYLENOL) 325 MG tablet Take 2 tablets by mouth Every 6 (Six) Hours As Needed for Mild Pain.       amLODIPine (NORVASC) 10 MG tablet Take 1 tablet by mouth Daily. 90 tablet 3 2024    aspirin 81 MG EC tablet Take 2 tablets by mouth Daily.   2024    benazepril (LOTENSIN) 40 MG tablet Take 1 tablet by mouth Daily. 90 tablet 3 2024    meloxicam (MOBIC) 15 MG tablet Take 1 tablet by mouth Daily.   2023    [] methylPREDNISolone (MEDROL) 4 MG dose pack Take  by mouth 2 (Two) Times a Day. Take as directed on package instructions.   2024    Multiple Vitamins-Minerals (MULTIVITAMIN ADULT) tablet Take 1 tablet by mouth Daily.   2024    sulfamethoxazole-trimethoprim (BACTRIM DS,SEPTRA DS) 800-160 MG per tablet Take 1 tablet by mouth 2 (Two) Times a Day.   2024    traMADol (ULTRAM) 50 MG tablet Take 1 tablet by mouth Every 6 (Six) Hours As Needed for Moderate Pain.          Allergies: Patient has no known allergies.    Scheduled Meds:amLODIPine, 10 mg, Oral, Daily  aspirin, 81 mg, Oral, BID  cefTRIAXone, 2,000 mg, Intravenous, Daily  dexAMETHasone, 2 mg, Intravenous, Q8H  enoxaparin, 40 mg, Subcutaneous, Daily  lisinopril, 40 mg, Oral, Q24H  methocarbamol, 750 mg, Oral, 4x Daily  senna-docusate sodium, 2 tablet, Oral, BID  senna-docusate sodium, 2 tablet, Oral, BID  sodium chloride, 10 mL, Intravenous, Q12H  sodium chloride, 10 mL, Intravenous, Q12H      Continuous Infusions:sodium chloride, 1,000 mL, Last  "Rate: Stopped (01/03/24 1417)      PRN Meds:.  acetaminophen    senna-docusate sodium **AND** polyethylene glycol **AND** bisacodyl **AND** bisacodyl    senna-docusate sodium **AND** polyethylene glycol **AND** bisacodyl **AND** bisacodyl    HYDROcodone-acetaminophen    ketorolac    nitroglycerin    ondansetron **OR** [DISCONTINUED] ondansetron    [COMPLETED] Insert Peripheral IV **AND** sodium chloride    sodium chloride    sodium chloride    sodium chloride    sodium chloride    traMADol    Objective     VITAL SIGNS  Vitals:    01/04/24 0609 01/04/24 0900 01/04/24 1019 01/04/24 1511   BP: 125/84  129/77 132/85   BP Location: Right arm  Right arm Left arm   Patient Position: Lying  Lying Lying   Pulse: 60 76 68 70   Resp: 18  20 16   Temp: 97.3 °F (36.3 °C)  98.1 °F (36.7 °C) 98 °F (36.7 °C)   TempSrc: Temporal  Oral Oral   SpO2: 94%  95% 94%   Weight:       Height:           Flowsheet Rows      Flowsheet Row First Filed Value   Admission Height 172.7 cm (68\") Documented at 01/01/2024 0658   Admission Weight 105 kg (231 lb 11.3 oz) Documented at 01/01/2024 0658             TELEMETRY: Sinus rhythm with nonspecific ST segment abnormality    Physical Exam:  Constitutional:       Appearance: Well-developed.   Eyes:      General: No scleral icterus.     Conjunctiva/sclera: Conjunctivae normal.      Pupils: Pupils are equal, round, and reactive to light.   HENT:      Head: Normocephalic and atraumatic.   Neck:      Vascular: No carotid bruit or JVD.   Pulmonary:      Effort: Pulmonary effort is normal.      Breath sounds: Normal breath sounds. No wheezing. No rales.   Cardiovascular:      Normal rate. Regular rhythm.   Pulses:     Intact distal pulses.   Abdominal:      General: Bowel sounds are normal.      Palpations: Abdomen is soft.   Musculoskeletal: Normal range of motion.      Cervical back: Normal range of motion and neck supple. Skin:     General: Skin is warm and dry.      Findings: No rash.   Neurological:     "  Mental Status: Alert.      Comments: No focal deficits          Results Review:   I reviewed the patient's new clinical results.  Lab Results (last 24 hours)       Procedure Component Value Units Date/Time    Potassium [[id number]]  (Normal) Collected: 01/04/24 1048    Specimen: Blood from Arm, Right Updated: 01/04/24 1116     Potassium 4.6 mmol/L     Magnesium [[id number]]  (Normal) Collected: 01/04/24 1048    Specimen: Blood from Arm, Right Updated: 01/04/24 1116     Magnesium 2.2 mg/dL     Blood Culture - Blood, Arm, Right [[id number]]  (Normal) Collected: 01/01/24 0757    Specimen: Blood from Arm, Right Updated: 01/04/24 0801     Blood Culture No growth at 3 days    Blood Culture - Blood, Arm, Left [[id number]]  (Normal) Collected: 01/01/24 0745    Specimen: Blood from Arm, Left Updated: 01/04/24 0801     Blood Culture No growth at 3 days    Wound Culture - Wound, Knee, Right [[id number]]  (Abnormal)  (Susceptibility) Collected: 01/02/24 1400    Specimen: Wound from Knee, Right Updated: 01/04/24 0715     Wound Culture Heavy growth (4+) Staphylococcus aureus     Gram Stain Moderate (3+) WBCs per low power field      No organisms seen    Susceptibility        Staphylococcus aureus      TK      Clindamycin Resistant      Erythromycin Resistant      Oxacillin Susceptible      Rifampin Susceptible      Tetracycline Susceptible      Trimethoprim + Sulfamethoxazole Susceptible      Vancomycin Susceptible                       Susceptibility Comments       Staphylococcus aureus    This isolate is presumed to be clindamycin resistant based on detection of inducible clindamycin resistance.  Clindamycin may still be effective in some patients.               Wound Culture - Wound, Knee, Right [[id number]]  (Abnormal) Collected: 01/02/24 1402    Specimen: Wound from Knee, Right Updated: 01/04/24 0715     Wound Culture Light growth (2+) Staphylococcus aureus     Gram Stain Moderate (3+) WBCs per low power field      No organisms  seen    Narrative:      Refer to previous wound culture collected on01/02/2024 1400 for MICs       Vancomycin, Trough [[id number]]  (Normal) Collected: 01/04/24 0521    Specimen: Blood from Arm, Right Updated: 01/04/24 0635     Vancomycin Trough 8.20 mcg/mL     Narrative:      Therapeutic Ranges for Vancomycin    Vancomycin Random   5.0-40.0 mcg/mL  Vancomycin Trough   5.0-20.0 mcg/mL  Vancomycin Peak     20.0-40.0 mcg/mL    CBC & Differential [[id number]]  (Abnormal) Collected: 01/04/24 0521    Specimen: Blood from Arm, Right Updated: 01/04/24 0617    Narrative:      The following orders were created for panel order CBC & Differential.  Procedure                               Abnormality         Status                     ---------                               -----------         ------                     CBC Auto Differential[[id number]]        Abnormal            Final result                 Please view results for these tests on the individual orders.    CBC Auto Differential [[id number]]  (Abnormal) Collected: 01/04/24 0521    Specimen: Blood from Arm, Right Updated: 01/04/24 0617     WBC 15.20 10*3/mm3      RBC 4.66 10*6/mm3      Hemoglobin 14.3 g/dL      Hematocrit 42.8 %      MCV 91.7 fL      MCH 30.7 pg      MCHC 33.5 g/dL      RDW 13.5 %      RDW-SD 43.3 fl      MPV 7.8 fL      Platelets 360 10*3/mm3      Neutrophil % 81.9 %      Lymphocyte % 11.2 %      Monocyte % 6.4 %      Eosinophil % 0.2 %      Basophil % 0.3 %      Neutrophils, Absolute 12.40 10*3/mm3      Lymphocytes, Absolute 1.70 10*3/mm3      Monocytes, Absolute 1.00 10*3/mm3      Eosinophils, Absolute 0.00 10*3/mm3      Basophils, Absolute 0.00 10*3/mm3      nRBC 0.0 /100 WBC     Vancomycin, Peak [[id number]]  (Abnormal) Collected: 01/04/24 0016    Specimen: Blood from Arm, Right Updated: 01/04/24 0051     Vancomycin Peak 13.00 mcg/mL     Narrative:      Therapeutic Ranges for Vancomycin    Vancomycin Random   5.0-40.0 mcg/mL  Vancomycin Trough    5.0-20.0 mcg/mL  Vancomycin Peak     20.0-40.0 mcg/mL    Basic Metabolic Panel [[id number]]  (Abnormal) Collected: 01/04/24 0016    Specimen: Blood from Arm, Right Updated: 01/04/24 0051     Glucose 110 mg/dL      BUN 26 mg/dL      Creatinine 0.93 mg/dL      Sodium 139 mmol/L      Potassium 4.4 mmol/L      Chloride 103 mmol/L      CO2 26.0 mmol/L      Calcium 9.3 mg/dL      BUN/Creatinine Ratio 28.0     Anion Gap 10.0 mmol/L      eGFR 95.2 mL/min/1.73     Narrative:      GFR Normal >60  Chronic Kidney Disease <60  Kidney Failure <15      Vancomycin, Trough [[id number]]  (Normal) Collected: 01/03/24 2225    Specimen: Blood Updated: 01/03/24 2302     Vancomycin Trough 14.40 mcg/mL     Narrative:      Therapeutic Ranges for Vancomycin    Vancomycin Random   5.0-40.0 mcg/mL  Vancomycin Trough   5.0-20.0 mcg/mL  Vancomycin Peak     20.0-40.0 mcg/mL            Imaging Results (Last 24 Hours)       ** No results found for the last 24 hours. **            EKG      I personally viewed and interpreted the patient's EKG/Telemetry data:    ECHOCARDIOGRAM:         STRESS MYOVIEW:       CARDIAC CATHETERIZATION:    No results found for this or any previous visit.       OTHER:         MDM      Ventricular tachycardia  Patient had an episode of nonsustained ventricular tachycardia and hence he will be ruled out for MI by EKG and enzymes  Patient will have an echocardiogram for LV function valve abnormalities  Patient will also need a stress Myoview study to rule out any ischemia  Patient's electrolytes will be checked.  If his LV function is normal then I will place him on beta-blockers if the stress test is negative    Status post knee surgery with a cellulitis and wound infection with staph  Patient is currently seen by infectious specialist and is on IV antibiotics  Patient also has a PICC line through which he is getting antibiotics but his blood cultures are negative.    Hypertension  Patient is currently on amlodipine but I will  change it to beta-blockers once the echo and stress test are complete     I discussed the patients findings and my recommendations with patient and nurse    Jon Mcqueen MD  01/04/24  17:11 EST

## 2024-01-04 NOTE — PROGRESS NOTES
Expand All Collapse [facility]        Patient Name: Damien Avalos  : 1965  MRN: [id number]  Primary Care Physician:  Brian Jones MD  Date of admission: 2024        Subjective   Subjective      Chief Complaint: Lrknee pain started Thursday     HPI:     Damien Avalos is a 58 y.o. male 58-year-old gentleman presented to the hospital with lrknee pain since Thursday evening progressively getting worse despite conservative treatment including rest and elevation  Pain worsening with range of motion  He did have some bodyaches and chills  Patient had aspiration of his left knee by orthopedic and was found to have cloudy aspiration culture  Patient was started on antibiotic and steroids on Friday and was sent to the hospital for increasing pain and swelling  Patient has been followed up per infectious disease and is on IV antibiotic     Review of Systems              Negative and for left knee pain  Generalized body aches  Chills  Most of the symptoms are resolved at this point rest of 14 system reviewed and negative      Patient is doing fine no new complaint  Had 6 beat run of V. tach  On IV antibiotic with vancomycin  Follow-up per orthopedic and ID  I will check his potassium and magnesium level  Discharge planning once cleared per ID and Ortho     Personal History      Medical History        Past Medical History:   Diagnosis Date    Hypertension              Surgical History         Past Surgical History:   Procedure Laterality Date    KNEE ARTHROSCOPY Right 2024     Procedure: KNEE ARTHROSCOPY;  Surgeon: Casey Kwan MD;  Location: [facility];  Service: Orthopedics;  Laterality: Right;    KNEE INCISION AND DRAINAGE Right 2024     Procedure: KNEE INCISION AND DRAINAGE;  Surgeon: Casey Kwan MD;  Location: [facility] OR;  Service: Orthopedics;  Laterality: Right;    SHOULDER SURGERY         right shoulder-slap tear            Family History:  family history includes Heart disease in his mother. Otherwise pertinent FHx was reviewed and not pertinent to current issue.     Social History:  reports that he has never smoked. He has never used smokeless tobacco. He reports that he does not drink alcohol and does not use drugs.     Home Medications:  acetaminophen, amLODIPine, aspirin, benazepril, meloxicam, methylPREDNISolone, multivitamin with minerals, sulfamethoxazole-trimethoprim, and traMADol     Allergies:  No Known Allergies           Objective   Objective      Vitals:   Temp:  [97.2 °F (36.2 °C)-97.9 °F (36.6 °C)] 97.9 °F (36.6 °C)  Heart Rate:  [60-83] 64  Resp:  [10-16] 16  BP: (111-143)/(64-86) 143/86  Flow (L/min):  [1-6] 2  Physical Exam                         Constitutional: Awake, alert              Eyes: PERRLA, sclerae anicteric, no conjunctival injection              HENT: NCAT, mucous membranes moist              Neck: Supple, no thyromegaly, no lymphadenopathy, trachea midline              Respiratory: Clear to auscultation bilaterally, nonlabored respirations               Cardiovascular: RRR, no murmurs, rubs, or gallops, palpable pedal pulses bilaterally              Gastrointestinal: Positive bowel sounds, soft, nontender, nondistended              Musculoskeletal: No bilateral ankle edema, no clubbing or cyanosis to extremities              Psychiatric: Appropriate affect, cooperative              Neurologic: Oriented x 3, strength symmetric in all extremities, Cranial Nerves grossly intact to confrontation, speech clear              Skin: No rashes            Result Review   Result Review:  I have personally reviewed the results from the time of this admission to 1/3/2024 14:13 EST and agree with these findings:  [x]  Laboratory list / accordion  []  Microbiology  []  Radiology  []  EKG/Telemetry   []  Cardiology/Vascular   []  Pathology  []  Old records  []  Other:  Most notable findings include:               Assessment &  Plan  Assessment / Plan      Brief Patient Summary:  Damien Avalos is a 58 y.o. male who is admitted with lrknee pain  Patient status post right knee arthroscopy and irrigation debridement and synovectomy     Patient has been followed up per infectious disease and orthopedic  Active Hospital Problems:       Active Hospital Problems     Diagnosis      **Cellulitis        Plan:   Patient with right knee infection  He is immunocompetent  Status post I&D and washout on January 2  Intraoperative cultures are pending  Patient status post right knee arthroscopy on December 2 due to meniscal tear  Continue IV ceftriaxone 2 g IV every 24 hours  Start IV vancomycin  Awaiting on intraoperative culture  Follow-up per ID and orthopedic     DVT prophylaxis:  Medical and mechanical DVT prophylaxis orders are present.     CODE STATUS:    Code Status (Patient has no pulse and is not breathing): CPR (Attempt to Resuscitate)  Medical Interventions (Patient has pulse or is breathing): Full Support     Admission Status:  I believe this patient meets ip status.     Trang Mann MD

## 2024-01-05 ENCOUNTER — APPOINTMENT (OUTPATIENT)
Dept: NUCLEAR MEDICINE | Facility: HOSPITAL | Age: 59
End: 2024-01-05
Payer: COMMERCIAL

## 2024-01-05 ENCOUNTER — APPOINTMENT (OUTPATIENT)
Dept: CARDIOLOGY | Facility: HOSPITAL | Age: 59
End: 2024-01-05
Payer: COMMERCIAL

## 2024-01-05 ENCOUNTER — READMISSION MANAGEMENT (OUTPATIENT)
Dept: CALL CENTER | Facility: HOSPITAL | Age: 59
End: 2024-01-05
Payer: COMMERCIAL

## 2024-01-05 VITALS
BODY MASS INDEX: 35.01 KG/M2 | TEMPERATURE: 98.1 F | HEART RATE: 86 BPM | OXYGEN SATURATION: 97 % | RESPIRATION RATE: 18 BRPM | HEIGHT: 68 IN | SYSTOLIC BLOOD PRESSURE: 130 MMHG | DIASTOLIC BLOOD PRESSURE: 65 MMHG | WEIGHT: 231 LBS

## 2024-01-05 LAB
BH CV ECHO MEAS - ACS: 2.2 CM
BH CV ECHO MEAS - AO MAX PG: 9 MMHG
BH CV ECHO MEAS - AO MEAN PG: 5 MMHG
BH CV ECHO MEAS - AO ROOT DIAM: 3.6 CM
BH CV ECHO MEAS - AO V2 MAX: 150 CM/SEC
BH CV ECHO MEAS - AO V2 VTI: 27.5 CM
BH CV ECHO MEAS - AVA(I,D): 3.3 CM2
BH CV ECHO MEAS - EDV(CUBED): 132.7 ML
BH CV ECHO MEAS - EDV(MOD-SP4): 137 ML
BH CV ECHO MEAS - EF(MOD-BP): 63.6 %
BH CV ECHO MEAS - EF(MOD-SP4): 63.6 %
BH CV ECHO MEAS - ESV(CUBED): 29.8 ML
BH CV ECHO MEAS - ESV(MOD-SP4): 49.8 ML
BH CV ECHO MEAS - FS: 39.2 %
BH CV ECHO MEAS - IVS/LVPW: 1 CM
BH CV ECHO MEAS - IVSD: 1.2 CM
BH CV ECHO MEAS - LA DIMENSION: 4.2 CM
BH CV ECHO MEAS - LAT PEAK E' VEL: 12.7 CM/SEC
BH CV ECHO MEAS - LV DIASTOLIC VOL/BSA (35-75): 63 CM2
BH CV ECHO MEAS - LV MASS(C)D: 241.2 GRAMS
BH CV ECHO MEAS - LV MAX PG: 5.5 MMHG
BH CV ECHO MEAS - LV MEAN PG: 3 MMHG
BH CV ECHO MEAS - LV SYSTOLIC VOL/BSA (12-30): 22.9 CM2
BH CV ECHO MEAS - LV V1 MAX: 117 CM/SEC
BH CV ECHO MEAS - LV V1 VTI: 23.6 CM
BH CV ECHO MEAS - LVIDD: 5.1 CM
BH CV ECHO MEAS - LVIDS: 3.1 CM
BH CV ECHO MEAS - LVOT AREA: 3.8 CM2
BH CV ECHO MEAS - LVOT DIAM: 2.2 CM
BH CV ECHO MEAS - LVPWD: 1.2 CM
BH CV ECHO MEAS - MED PEAK E' VEL: 9.9 CM/SEC
BH CV ECHO MEAS - MR MAX PG: 63.7 MMHG
BH CV ECHO MEAS - MR MAX VEL: 399 CM/SEC
BH CV ECHO MEAS - MV A MAX VEL: 94.3 CM/SEC
BH CV ECHO MEAS - MV DEC SLOPE: 364 CM/SEC2
BH CV ECHO MEAS - MV DEC TIME: 0.19 SEC
BH CV ECHO MEAS - MV E MAX VEL: 93.3 CM/SEC
BH CV ECHO MEAS - MV E/A: 0.99
BH CV ECHO MEAS - MV MAX PG: 3.5 MMHG
BH CV ECHO MEAS - MV MEAN PG: 2 MMHG
BH CV ECHO MEAS - MV P1/2T: 79.4 MSEC
BH CV ECHO MEAS - MV V2 VTI: 29.3 CM
BH CV ECHO MEAS - MVA(P1/2T): 2.8 CM2
BH CV ECHO MEAS - MVA(VTI): 3.1 CM2
BH CV ECHO MEAS - PA ACC TIME: 0.14 SEC
BH CV ECHO MEAS - PA V2 MAX: 120 CM/SEC
BH CV ECHO MEAS - PI END-D VEL: 104 CM/SEC
BH CV ECHO MEAS - PULM A REVS DUR: 0.11 SEC
BH CV ECHO MEAS - PULM A REVS VEL: 43.4 CM/SEC
BH CV ECHO MEAS - PULM DIAS VEL: 46.6 CM/SEC
BH CV ECHO MEAS - PULM S/D: 1.39
BH CV ECHO MEAS - PULM SYS VEL: 64.9 CM/SEC
BH CV ECHO MEAS - QP/QS: 1.74
BH CV ECHO MEAS - RV MAX PG: 3.3 MMHG
BH CV ECHO MEAS - RV V1 MAX: 91 CM/SEC
BH CV ECHO MEAS - RV V1 VTI: 18.3 CM
BH CV ECHO MEAS - RVDD: 3.4 CM
BH CV ECHO MEAS - RVOT DIAM: 3.3 CM
BH CV ECHO MEAS - SI(MOD-SP4): 40.1 ML/M2
BH CV ECHO MEAS - SV(LVOT): 89.7 ML
BH CV ECHO MEAS - SV(MOD-SP4): 87.2 ML
BH CV ECHO MEAS - SV(RVOT): 156.5 ML
BH CV ECHO MEAS - TAPSE (>1.6): 3.4 CM
BH CV ECHO MEASUREMENTS AVERAGE E/E' RATIO: 8.26
BH CV NUCLEAR PRIOR STUDY: 3
BH CV REST NUCLEAR ISOTOPE DOSE: 11 MCI
BH CV STRESS BP STAGE 1: NORMAL
BH CV STRESS BP STAGE 2: NORMAL
BH CV STRESS BP STAGE 3: NORMAL
BH CV STRESS COMMENTS STAGE 1: NORMAL
BH CV STRESS COMMENTS STAGE 2: NORMAL
BH CV STRESS DOSE REGADENOSON STAGE 1: 0.4
BH CV STRESS DURATION MIN STAGE 1: 0
BH CV STRESS DURATION MIN STAGE 2: 4
BH CV STRESS DURATION SEC STAGE 1: 10
BH CV STRESS DURATION SEC STAGE 2: 0
BH CV STRESS HR STAGE 1: 85
BH CV STRESS HR STAGE 2: 92
BH CV STRESS HR STAGE 3: 94
BH CV STRESS NUCLEAR ISOTOPE DOSE: 33 MCI
BH CV STRESS PROTOCOL 1: NORMAL
BH CV STRESS RECOVERY BP: NORMAL MMHG
BH CV STRESS RECOVERY HR: 88 BPM
BH CV STRESS STAGE 1: 1
BH CV STRESS STAGE 2: 2
BH CV STRESS STAGE 3: 3
BH CV XLRA - TDI S': 17 CM/SEC
GEN 5 2HR TROPONIN T REFLEX: 7 NG/L
LEFT ATRIUM VOLUME INDEX: 23.2 ML/M2
LV EF NUC BP: 70 %
MAXIMAL PREDICTED HEART RATE: 162 BPM
PERCENT MAX PREDICTED HR: 58.02 %
SINUS: 3.3 CM
STJ: 2.6 CM
STRESS BASELINE BP: NORMAL MMHG
STRESS BASELINE HR: 67 BPM
STRESS PERCENT HR: 68 %
STRESS POST PEAK BP: NORMAL MMHG
STRESS POST PEAK HR: 94 BPM
STRESS TARGET HR: 138 BPM
TROPONIN T DELTA: -1 NG/L
TROPONIN T SERPL HS-MCNC: 8 NG/L

## 2024-01-05 PROCEDURE — 99232 SBSQ HOSP IP/OBS MODERATE 35: CPT | Performed by: INTERNAL MEDICINE

## 2024-01-05 PROCEDURE — 93306 TTE W/DOPPLER COMPLETE: CPT

## 2024-01-05 PROCEDURE — 0 TECHNETIUM TETROFOSMIN KIT: Performed by: INTERNAL MEDICINE

## 2024-01-05 PROCEDURE — 93356 MYOCRD STRAIN IMG SPCKL TRCK: CPT | Performed by: INTERNAL MEDICINE

## 2024-01-05 PROCEDURE — 25010000002 DEXAMETHASONE PER 1 MG: Performed by: NURSE PRACTITIONER

## 2024-01-05 PROCEDURE — 84484 ASSAY OF TROPONIN QUANT: CPT

## 2024-01-05 PROCEDURE — 93306 TTE W/DOPPLER COMPLETE: CPT | Performed by: INTERNAL MEDICINE

## 2024-01-05 PROCEDURE — 93017 CV STRESS TEST TRACING ONLY: CPT

## 2024-01-05 PROCEDURE — 25010000002 REGADENOSON 0.4 MG/5ML SOLUTION: Performed by: INTERNAL MEDICINE

## 2024-01-05 PROCEDURE — A9502 TC99M TETROFOSMIN: HCPCS | Performed by: INTERNAL MEDICINE

## 2024-01-05 PROCEDURE — 78452 HT MUSCLE IMAGE SPECT MULT: CPT

## 2024-01-05 PROCEDURE — 25010000002 CEFTRIAXONE PER 250 MG: Performed by: NURSE PRACTITIONER

## 2024-01-05 PROCEDURE — 78452 HT MUSCLE IMAGE SPECT MULT: CPT | Performed by: INTERNAL MEDICINE

## 2024-01-05 PROCEDURE — 93356 MYOCRD STRAIN IMG SPCKL TRCK: CPT

## 2024-01-05 PROCEDURE — 93018 CV STRESS TEST I&R ONLY: CPT | Performed by: INTERNAL MEDICINE

## 2024-01-05 RX ORDER — METOPROLOL SUCCINATE 25 MG/1
25 TABLET, EXTENDED RELEASE ORAL
Qty: 30 TABLET | Refills: 0 | Status: SHIPPED | OUTPATIENT
Start: 2024-01-06

## 2024-01-05 RX ORDER — METOPROLOL SUCCINATE 25 MG/1
25 TABLET, EXTENDED RELEASE ORAL
Status: DISCONTINUED | OUTPATIENT
Start: 2024-01-05 | End: 2024-01-05 | Stop reason: HOSPADM

## 2024-01-05 RX ORDER — REGADENOSON 0.08 MG/ML
0.4 INJECTION, SOLUTION INTRAVENOUS
Status: COMPLETED | OUTPATIENT
Start: 2024-01-05 | End: 2024-01-05

## 2024-01-05 RX ADMIN — ACETAMINOPHEN 650 MG: 325 TABLET, FILM COATED ORAL at 08:24

## 2024-01-05 RX ADMIN — DOCUSATE SODIUM AND SENNOSIDES 2 TABLET: 8.6; 5 TABLET, FILM COATED ORAL at 08:10

## 2024-01-05 RX ADMIN — HYDROCODONE BITARTRATE AND ACETAMINOPHEN 1 TABLET: 7.5; 325 TABLET ORAL at 02:39

## 2024-01-05 RX ADMIN — TETROFOSMIN 1 DOSE: 1.38 INJECTION, POWDER, LYOPHILIZED, FOR SOLUTION INTRAVENOUS at 12:10

## 2024-01-05 RX ADMIN — METHOCARBAMOL 750 MG: 500 TABLET ORAL at 11:25

## 2024-01-05 RX ADMIN — METHOCARBAMOL 750 MG: 500 TABLET ORAL at 08:11

## 2024-01-05 RX ADMIN — METOPROLOL SUCCINATE 25 MG: 25 TABLET, EXTENDED RELEASE ORAL at 14:16

## 2024-01-05 RX ADMIN — AMLODIPINE BESYLATE 10 MG: 5 TABLET ORAL at 08:11

## 2024-01-05 RX ADMIN — CEFTRIAXONE 2000 MG: 2 INJECTION, POWDER, FOR SOLUTION INTRAMUSCULAR; INTRAVENOUS at 08:11

## 2024-01-05 RX ADMIN — DEXAMETHASONE SODIUM PHOSPHATE 2 MG: 4 INJECTION, SOLUTION INTRAMUSCULAR; INTRAVENOUS at 02:39

## 2024-01-05 RX ADMIN — Medication 10 ML: at 08:25

## 2024-01-05 RX ADMIN — Medication 10 ML: at 08:23

## 2024-01-05 RX ADMIN — Medication 10 ML: at 08:26

## 2024-01-05 RX ADMIN — DEXAMETHASONE SODIUM PHOSPHATE 2 MG: 4 INJECTION, SOLUTION INTRAMUSCULAR; INTRAVENOUS at 09:50

## 2024-01-05 RX ADMIN — ACETAMINOPHEN 650 MG: 325 TABLET, FILM COATED ORAL at 16:17

## 2024-01-05 RX ADMIN — LISINOPRIL 40 MG: 20 TABLET ORAL at 08:11

## 2024-01-05 RX ADMIN — REGADENOSON 0.4 MG: 0.08 INJECTION, SOLUTION INTRAVENOUS at 12:10

## 2024-01-05 RX ADMIN — ASPIRIN 81 MG: 81 TABLET, COATED ORAL at 08:11

## 2024-01-05 NOTE — CASE MANAGEMENT/SOCIAL WORK
Continued Stay Note  [facility]OLY[facility] Mendenhall     Patient Name: Damien Avalos  MRN: [id number]  Today's Date: 1/5/2024    Admit Date: 1/1/2024    Plan: Home w. [facility] for 6 weeks IV abx. Amb care for line care/labs   Discharge Plan       Row Name 01/05/24 0846       Plan    Plan Home w. [facility] for 6 weeks IV abx. Amb care for line care/labs    Plan Comments DC barriers: Cardiology and ID following, myoview ordered late yesterday due to run of v-tach, IV abx, IV steroids. [facility] will do bedside teaching today at 11am and home antibiotics will be delivered to patients home tomorrow morning if patient discharges today.                Alfonso Randall RN     Cell number [phone]  Office number [phone]

## 2024-01-05 NOTE — OUTREACH NOTE
Prep Survey      Flowsheet Row Responses   [facility]? [facility]   Is LACE score < 7 ? No   Eligibility [facility]   [facility]   Date of Admission 01/01/24   Date of Discharge 01/05/24   Discharge Disposition Home-Health Care Svc   Discharge diagnosis Right knee effusion with suspected infection, Right knee arthroscopy with irrigation debridement and synovectomy   Does the patient have one of the following disease processes/diagnoses(primary or secondary)? General Surgery   Does the patient have Home health ordered? Yes   What is the Home health agency?  [facility] for IV abx x 6 weeks   Is there a DME ordered? No   Prep survey completed? Yes            Gina WOOD - Registered Nurse

## 2024-01-05 NOTE — PLAN OF CARE
Problem: Adult Inpatient Plan of Care  Goal: Plan of Care Review  Outcome: Ongoing, Progressing  Goal: Patient-Specific Goal (Individualized)  Outcome: Ongoing, Progressing  Goal: Absence of Hospital-Acquired Illness or Injury  Outcome: Ongoing, Progressing  Intervention: Identify and Manage Fall Risk  Recent Flowsheet Documentation  Taken 1/5/2024 1200 by Sergio Oviedo RN  Safety Promotion/Fall Prevention: patient off unit  Taken 1/5/2024 1036 by Sergio Oviedo RN  Safety Promotion/Fall Prevention: safety round/check completed  Taken 1/5/2024 0800 by Sergio Oviedo RN  Safety Promotion/Fall Prevention: safety round/check completed  Intervention: Prevent Skin Injury  Recent Flowsheet Documentation  Taken 1/5/2024 0800 by Sergio Oviedo RN  Body Position: position changed independently  Intervention: Prevent and Manage VTE (Venous Thromboembolism) Risk  Recent Flowsheet Documentation  Taken 1/5/2024 0800 by Sergio Oviedo RN  Activity Management: ambulated in room  Goal: Optimal Comfort and Wellbeing  Outcome: Ongoing, Progressing  Intervention: Monitor Pain and Promote Comfort  Recent Flowsheet Documentation  Taken 1/5/2024 0800 by Sergio Oviedo RN  Pain Management Interventions: see MAR  Intervention: Provide Person-Centered Care  Recent Flowsheet Documentation  Taken 1/5/2024 0800 by Sergio Oviedo RN  Trust Relationship/Rapport: care explained  Goal: Readiness for Transition of Care  Outcome: Ongoing, Progressing  Goal: Plan of Care Review  Outcome: Ongoing, Progressing  Goal: Patient-Specific Goal (Individualized)  Outcome: Ongoing, Progressing  Goal: Absence of Hospital-Acquired Illness or Injury  Outcome: Ongoing, Progressing  Intervention: Identify and Manage Fall Risk  Recent Flowsheet Documentation  Taken 1/5/2024 1200 by Sergio Oviedo RN  Safety Promotion/Fall Prevention: patient off unit  Taken 1/5/2024 1036 by Sergio Oviedo RN  Safety Promotion/Fall Prevention: safety round/check  completed  Taken 1/5/2024 0800 by Sergio Oviedo RN  Safety Promotion/Fall Prevention: safety round/check completed  Intervention: Prevent Skin Injury  Recent Flowsheet Documentation  Taken 1/5/2024 0800 by Sergio Oviedo RN  Body Position: position changed independently  Intervention: Prevent and Manage VTE (Venous Thromboembolism) Risk  Recent Flowsheet Documentation  Taken 1/5/2024 0800 by Sergio Oviedo RN  Activity Management: ambulated in room  Goal: Optimal Comfort and Wellbeing  Outcome: Ongoing, Progressing  Intervention: Monitor Pain and Promote Comfort  Recent Flowsheet Documentation  Taken 1/5/2024 0800 by Sergio Oviedo RN  Pain Management Interventions: see MAR  Intervention: Provide Person-Centered Care  Recent Flowsheet Documentation  Taken 1/5/2024 0800 by Sergio Oviedo RN  Trust Relationship/Rapport: care explained  Goal: Readiness for Transition of Care  Outcome: Ongoing, Progressing   Goal Outcome Evaluation:      Pt came in with cellulitis and infection. PICC put in for pt to go home on antibiotics. Pt was having bradycardia and runs of vtach. Stress test troponin drawed and echo on board to evaluate pt. Continuing to monitor possible discharge

## 2024-01-05 NOTE — PROGRESS NOTES
CARDIOLOGY PROGRESS NOTE:    Damien Avalos  58 y.o.  male  1965  [id number]      Referring Provider: Hospitalist    Reason for follow-up: Arrhythmia     Patient Care Team:  Brian Jones MD as PCP - General    Subjective .  No chest pain or shortness of breath    Objective lying in bed comfortably     Review of Systems   Constitutional: Negative for malaise/fatigue.   Cardiovascular:  Negative for chest pain, dyspnea on exertion, leg swelling and palpitations.   Respiratory:  Negative for cough and shortness of breath.    Gastrointestinal:  Negative for abdominal pain, nausea and vomiting.   Neurological:  Negative for dizziness, focal weakness, headaches, light-headedness and numbness.   All other systems reviewed and are negative.      Allergies: Patient has no known allergies.    Scheduled Meds:amLODIPine, 10 mg, Oral, Daily  aspirin, 81 mg, Oral, BID  cefTRIAXone, 2,000 mg, Intravenous, Daily  dexAMETHasone, 2 mg, Intravenous, Q8H  enoxaparin, 40 mg, Subcutaneous, Daily  lisinopril, 40 mg, Oral, Q24H  methocarbamol, 750 mg, Oral, 4x Daily  senna-docusate sodium, 2 tablet, Oral, BID  senna-docusate sodium, 2 tablet, Oral, BID  sodium chloride, 10 mL, Intravenous, Q12H  sodium chloride, 10 mL, Intravenous, Q12H      Continuous Infusions:   PRN Meds:.  acetaminophen    senna-docusate sodium **AND** polyethylene glycol **AND** bisacodyl **AND** bisacodyl    senna-docusate sodium **AND** polyethylene glycol **AND** bisacodyl **AND** bisacodyl    HYDROcodone-acetaminophen    ketorolac    nitroglycerin    ondansetron **OR** [DISCONTINUED] ondansetron    [COMPLETED] Insert Peripheral IV **AND** sodium chloride    sodium chloride    sodium chloride    sodium chloride    sodium chloride    traMADol        VITAL SIGNS  Vitals:    01/04/24 2220 01/05/24 0235 01/05/24 0616 01/05/24 1021   BP: 144/88 137/80 138/83 126/85   BP Location: Left arm Left arm Left arm Left arm   Patient Position: Lying Lying Lying  "Sitting   Pulse: 65 60 64 69   Resp: 18 16 14 16   Temp: 97.8 °F (36.6 °C) 97.7 °F (36.5 °C) 97.3 °F (36.3 °C) 98.1 °F (36.7 °C)   TempSrc: Oral Oral Temporal Oral   SpO2: 96% 97% 97% 99%   Weight:    105 kg (231 lb)   Height:    172.7 cm (68\")       Flowsheet Rows      Flowsheet Row First Filed Value   Admission Height 172.7 cm (68\") Documented at 01/01/2024 0658   Admission Weight 105 kg (231 lb 11.3 oz) Documented at 01/01/2024 0658             TELEMETRY: Sinus rhythm    Physical Exam:  Constitutional:       Appearance: Well-developed.   Eyes:      General: No scleral icterus.     Conjunctiva/sclera: Conjunctivae normal.   HENT:      Head: Normocephalic and atraumatic.   Neck:      Vascular: No carotid bruit or JVD.   Pulmonary:      Effort: Pulmonary effort is normal.      Breath sounds: Normal breath sounds. No wheezing. No rales.   Cardiovascular:      Normal rate. Regular rhythm.   Pulses:     Intact distal pulses.   Abdominal:      General: Bowel sounds are normal.      Palpations: Abdomen is soft.   Musculoskeletal:      Cervical back: Normal range of motion and neck supple. Skin:     General: Skin is warm and dry.      Findings: No rash.   Neurological:      Mental Status: Alert.          Results Review:   I reviewed the patient's new clinical results.  Lab Results (last 24 hours)       Procedure Component Value Units Date/Time    High Sensitivity Troponin T 2Hr [[id number]]  (Normal) Collected: 01/05/24 1100    Specimen: Blood from Arm, Right Updated: 01/05/24 1141     HS Troponin T 7 ng/L      Troponin T Delta -1 ng/L     Narrative:      High Sensitive Troponin T Reference Range:  <14.0 ng/L- Negative Female for AMI  <22.0 ng/L- Negative Male for AMI  >=14 - Abnormal Female indicating possible myocardial injury.  >=22 - Abnormal Male indicating possible myocardial injury.   Clinicians would have to utilize clinical acumen, EKG, Troponin, and serial changes to determine if it is an Acute Myocardial " Infarction or myocardial injury due to an underlying chronic condition.         High Sensitivity Troponin T [[id number]]  (Normal) Collected: 01/05/24 0936    Specimen: Blood from Arm, Right Updated: 01/05/24 1009     HS Troponin T 8 ng/L     Narrative:      High Sensitive Troponin T Reference Range:  <14.0 ng/L- Negative Female for AMI  <22.0 ng/L- Negative Male for AMI  >=14 - Abnormal Female indicating possible myocardial injury.  >=22 - Abnormal Male indicating possible myocardial injury.   Clinicians would have to utilize clinical acumen, EKG, Troponin, and serial changes to determine if it is an Acute Myocardial Infarction or myocardial injury due to an underlying chronic condition.         Blood Culture - Blood, Arm, Right [[id number]]  (Normal) Collected: 01/01/24 0757    Specimen: Blood from Arm, Right Updated: 01/05/24 0815     Blood Culture No growth at 4 days    Blood Culture - Blood, Arm, Left [[id number]]  (Normal) Collected: 01/01/24 0745    Specimen: Blood from Arm, Left Updated: 01/05/24 0800     Blood Culture No growth at 4 days    Anaerobic Culture - Wound, Knee, Right [[id number]]  (Normal) Collected: 01/02/24 1402    Specimen: Wound from Knee, Right Updated: 01/05/24 0722     Anaerobic Culture No anaerobes isolated at 3 days    Anaerobic Culture - Wound, Knee, Right [[id number]]  (Normal) Collected: 01/02/24 1400    Specimen: Wound from Knee, Right Updated: 01/05/24 0722     Anaerobic Culture No anaerobes isolated at 3 days            Imaging Results (Last 24 Hours)       ** No results found for the last 24 hours. **            EKG      I personally viewed and interpreted the patient's EKG/Telemetry data:    ECHOCARDIOGRAM:  Results for orders placed during the hospital encounter of 01/01/24    Adult Transthoracic Echo Complete W/ Cont if Necessary Per Protocol    Interpretation Summary    Left ventricular ejection fraction appears to be 61 - 65%.    The right ventricular cavity is mildly dilated.     The left atrial cavity is mildly dilated.    Mild dilation of the aortic root is present.       STRESS MYOVIEW:  Results for orders placed during the hospital encounter of 01/01/24    Stress Test With Myocardial Perfusion One Day    Interpretation Summary    Impressions are consistent with a low risk study.    Left ventricular ejection fraction is normal (Calculated EF = 70%).    Myocardial perfusion imaging indicates a small-sized, mildly severe area of ischemia located in the inferior wall.       CARDIAC CATHETERIZATION:  No results found for this or any previous visit.       OTHER:         Assessment & Plan     Ventricular tachycardia  Patient had an episode of nonsustained ventricular tachycardia and hence he will be ruled out for MI by EKG and enzymes  Patient had an echocardiogram which showed normal V function  Patient had a stress Myoview which showed small amount of ischemia in inferior wall which is low risk study  Since patient is not having any chest pain we will continue medical therapy with low-dose beta-blockers and follow him in the office  If patient has any angina then will perform cardiac catheterization.     Status post knee surgery with a cellulitis and wound infection with staph  Patient is currently seen by infectious specialist and is on IV antibiotics  Patient also has a PICC line through which he is getting antibiotics but his blood cultures are negative.     Hypertension  Patient is currently on amlodipine but I will change it to beta-blockers once the echo and stress test are complete     I discussed the patients findings and my recommendations with patient and nurse    Jon Mcqueen MD  01/05/24  14:03 EST

## 2024-01-05 NOTE — PAYOR COMM NOTE
"Damien Avalos (58 y.o. Male)       Date of Birth   1965    Social Security Number       Address   [address] [facility] [place] [address]    Home Phone   [phone]    MRN   [id number]       Taoist   Mandaeism    Marital Status                               Admission Date   1/1/24    Admission Type   Emergency    Admitting Provider   Trang Mann MD    Attending Provider   Trang Mann MD    Department, Room/Bed   [facility] OBSERVATION, 103/1       Discharge Date       Discharge Disposition       Discharge Destination                                 Attending Provider: Trang Mann MD    Allergies: No Known Allergies    Isolation: None   Infection: None   Code Status: CPR    Ht: 172.7 cm (68\")   Wt: 105 kg (231 lb)    Admission Cmt: None   Principal Problem: Cellulitis [L03.90]                   Active Insurance as of 1/1/2024       Primary Coverage       Payor Plan Insurance Group Employer/Plan Group    [employer] PPO [id number]       Payor Plan Address Payor Plan Phone Number Payor Plan Fax Number Effective Dates    [address]   1/1/2023 - None Entered    [facility] [id number]         Subscriber Name Subscriber Birth Date Member ID       MADAY AVALOS 1965 [id number]                     Emergency Contacts        (Rel.) Home Phone Work Phone Mobile Phone    MADAY AVALOS (Spouse) [phone] -- --              Vital Signs (last day)       Date/Time Temp Temp src Pulse Resp BP Patient Position SpO2    01/05/24 0616 97.3 (36.3) Temporal 64 14 138/83 Lying 97    01/05/24 0235 97.7 (36.5) Oral 60 16 137/80 Lying 97    01/04/24 2220 97.8 (36.6) Oral 65 18 144/88 Lying 96    01/04/24 1741 97.9 (36.6) Oral 74 18 146/82 Lying 95    01/04/24 1511 98 (36.7) Oral 70 16 132/85 Lying 94    01/04/24 1019 98.1 (36.7) Oral 68 20 129/77 Lying 95    01/04/24 0900 -- -- 76 -- -- -- --    01/04/24 0609 97.3 (36.3) Temporal 60 18 125/84 " Lying 94    01/04/24 0150 97.5 (36.4) Oral 90 16 124/82 Lying 94          Facility-Administered Medications as of 1/5/2024   Medication Dose Route Frequency Provider Last Rate Last Admin    acetaminophen (TYLENOL) tablet 650 mg  650 mg Oral Q4H PRN Edwina Tristan APRN   650 mg at 01/05/24 0824    amLODIPine (NORVASC) tablet 10 mg  10 mg Oral Daily Edwina Tristan APRN   10 mg at 01/05/24 0811    aspirin EC tablet 81 mg  81 mg Oral BID Casey Kwan MD   81 mg at 01/05/24 0811    sennosides-docusate (PERICOLACE) 8.6-50 MG per tablet 2 tablet  2 tablet Oral BID Edwina Tristan APRN   2 tablet at 01/03/24 0906    And    polyethylene glycol (MIRALAX) packet 17 g  17 g Oral Daily PRN Edwina Tristan APRN        And    bisacodyl (DULCOLAX) EC tablet 5 mg  5 mg Oral Daily PRN Edwina Tristan APRN        And    bisacodyl (DULCOLAX) suppository 10 mg  10 mg Rectal Daily PRN Edwina Tristan APRN        sennosides-docusate (PERICOLACE) 8.6-50 MG per tablet 2 tablet  2 tablet Oral BID Trang Mann MD   2 tablet at 01/05/24 0810    And    polyethylene glycol (MIRALAX) packet 17 g  17 g Oral Daily PRN Trang Mann MD        And    bisacodyl (DULCOLAX) EC tablet 5 mg  5 mg Oral Daily PRN Trang Mann MD        And    bisacodyl (DULCOLAX) suppository 10 mg  10 mg Rectal Daily PRN Trang Mann MD        [COMPLETED] cefTRIAXone (ROCEPHIN) 2,000 mg in sodium chloride 0.9 % 100 mL IVPB  2,000 mg Intravenous Once Tammie Watson APRN   Stopped at 01/01/24 1125    cefTRIAXone (ROCEPHIN) 2,000 mg in sodium chloride 0.9 % 100 mL IVPB  2,000 mg Intravenous Daily Sofia Townsend APRN 200 mL/hr at 01/05/24 0811 2,000 mg at 01/05/24 0811    dexAMETHasone (DECADRON) injection 2 mg  2 mg Intravenous Q8H Edwina Tristan APRN   2 mg at 01/05/24 0239    Enoxaparin Sodium (LOVENOX) syringe 40 mg  40 mg Subcutaneous Daily Trang Mann MD   40 mg at 01/04/24 1530     HYDROcodone-acetaminophen (NORCO) 7.5-325 MG per tablet 1 tablet  1 tablet Oral Q6H PRN Edwina Tristan APRN   1 tablet at 24 0239    ketorolac (TORADOL) injection 15 mg  15 mg Intravenous Q6H PRN Edwina Tristan APRN   15 mg at 24 0148    lisinopril (PRINIVIL,ZESTRIL) tablet 40 mg  40 mg Oral Q24H Edwina Tristan APRN   40 mg at 24 0811    methocarbamol (ROBAXIN) tablet 750 mg  750 mg Oral 4x Daily Edwina Tristan APRN   750 mg at 24 0811    nitroglycerin (NITROSTAT) SL tablet 0.4 mg  0.4 mg Sublingual Q5 Min PRN Edwina Tristan APRN        ondansetron (ZOFRAN) tablet 4 mg  4 mg Oral Q6H PRN Edwina Tristan APRN   4 mg at 24 0408    sodium chloride 0.9 % flush 10 mL  10 mL Intravenous PRN Edwina Tristan APRN        sodium chloride 0.9 % flush 10 mL  10 mL Intravenous Q12H Edwina Tristan APRN   10 mL at 24 0826    sodium chloride 0.9 % flush 10 mL  10 mL Intravenous PRN Edwina Tristan APRN        sodium chloride 0.9 % flush 10 mL  10 mL Intravenous Q12H Trang Mann MD   10 mL at 24 0823    sodium chloride 0.9 % flush 10 mL  10 mL Intravenous PRN Trang Mann MD   10 mL at 24 0825    [] sodium chloride 0.9 % infusion 1,000 mL  1,000 mL Intravenous Continuous Edwina Tristan APRN   Stopped at 24 1417    sodium chloride 0.9 % infusion 40 mL  40 mL Intravenous PRN Edwina Trisatn APRN        sodium chloride 0.9 % infusion 40 mL  40 mL Intravenous PRN Trang Mann MD        traMADol (ULTRAM) tablet 50 mg  50 mg Oral Q6H PRN Edwina Tristan APRN        [COMPLETED] vancomycin IVPB 1750 mg in 0.9% Sodium Chloride (premix) 500 mL  20 mg/kg (Adjusted) Intravenous Once Sofia Townsend APRN   Stopped at 24        Operative/Procedure Notes (all)        Casey Kwan MD at 24 135  Version 1 of 1         NAME: Damien Avalos     DATE OF  BIRTH: 1965    DATE OF SURGERY: 1/2/2024    PREOPERATIVE DIAGNOSIS: Right knee effusion with suspected infection    POSTOPERATIVE DIAGNOSIS: Same    PROCEDURE PERFORMED: Right knee arthroscopy with irrigation debridement and synovectomy  Fluid sent for culture as well as tissue    SURGEON:Casey Kwan M.D.    ASSISTANT: Dayanna lock np  Assistant was utilized for leg positioning running the arthroscopy shaver foot pump dressing placement sutures    Estimated Blood Loss: minimal  Specimens : Fluid cultures  Complications: none    DESCRIPTION OF PROCEDURE: The patient was taken to the operating room and placed in the supine position. After adequate induction of general anesthesia the leg was prepped and draped in the usual sterile fashion exsanguinated tourniquet placed.  The leg was not exsanguinated.    We started the case by aspirating 50 cc of straw-colored fluid with some precipitate from the suprapatellar region.  This was sent for culture  Next we used a culture swab to swab inside the joint which was sent for second culture  Diagnostic arthroscopy revealed some inflamed synovium  No gross purulence noted.  A shaver was introduced and a complete synovectomy starting in the suprapatellar pouch working into the medial gutter, anterior interval, lateral interval, lateral joint, medial joint was then performed  We irrigated with total of 6 L of fluid.  There was no obvious  Cartilage damage at this point.  Hemostasis was achieved  Sutures placed  The knee was injected with ropivacaine, Toradol, vancomycin and tranexamic acid   Sterile dressings were then applied the patient was extubated and transferred to the recovery room.  At the end of the case the sponge and needle counts were reported as being correct and there were no known complications.  Infectious diseases been consulted  Will continue antibiotics await cultures.    Casey Kwan M.D.  1/2/2024     Electronically signed by Casey Kwan MD  at 01/02/24 1448          Physician Progress Notes (last 24 hours)        Sofia Townsend, APRN at 01/04/24 1514          Infectious Diseases Progress Note      LOS: 1 day   Patient Care Team:  Brian Jones MD as PCP - General    Chief Complaint: Right knee pain    Subjective       The patient has been afebrile for the last 24 hours.  The patient is on room air, hemodynamically stable, and is tolerating antimicrobial therapy.  No new complaints.  Patient is ready to go home      Review of Systems:   Review of Systems   Constitutional: Negative.    HENT: Negative.     Eyes: Negative.    Respiratory: Negative.     Cardiovascular: Negative.    Gastrointestinal: Negative.    Endocrine: Negative.    Genitourinary: Negative.    Musculoskeletal:  Positive for joint swelling.   Neurological: Negative.    Psychiatric/Behavioral: Negative.     All other systems reviewed and are negative.       Objective     Vital Signs  Temp:  [97.3 °F (36.3 °C)-98.1 °F (36.7 °C)] 98 °F (36.7 °C)  Heart Rate:  [60-90] 70  Resp:  [14-20] 16  BP: (124-146)/(76-85) 132/85    Physical Exam:  Physical Exam  Vitals and nursing note reviewed.   Constitutional:       General: He is not in acute distress.     Appearance: Normal appearance. He is well-developed and normal weight. He is not diaphoretic.   HENT:      Head: Normocephalic and atraumatic.   Eyes:      Conjunctiva/sclera: Conjunctivae normal.      Pupils: Pupils are equal, round, and reactive to light.   Cardiovascular:      Rate and Rhythm: Normal rate and regular rhythm.      Heart sounds: Normal heart sounds, S1 normal and S2 normal.   Pulmonary:      Effort: Pulmonary effort is normal. No respiratory distress.      Breath sounds: Normal breath sounds. No stridor. No wheezing or rales.   Abdominal:      General: Bowel sounds are normal. There is no distension.      Palpations: Abdomen is soft. There is no mass.      Tenderness: There is no abdominal tenderness. There is no  guarding.   Musculoskeletal:         General: No deformity. Normal range of motion.      Cervical back: Neck supple.      Comments: Dressings on right knee-no erythema above or below dressings   Skin:     General: Skin is warm and dry.      Coloration: Skin is not pale.      Findings: No erythema or rash.   Neurological:      Mental Status: He is alert and oriented to person, place, and time.      Cranial Nerves: No cranial nerve deficit.   Psychiatric:         Mood and Affect: Mood normal.          Results Review:    I have reviewed all clinical data, test, lab, and imaging results.     Radiology  No Radiology Exams Resulted Within Past 24 Hours    Cardiology    Laboratory    Results from last 7 days   Lab Units 01/04/24  0521 01/03/24  0334 01/02/24  0432 01/01/24  0745   WBC 10*3/mm3 15.20* 20.40* 18.00* 16.30*   HEMOGLOBIN g/dL 14.3 14.1 14.5 14.5   HEMATOCRIT % 42.8 41.2 42.6 42.4   PLATELETS 10*3/mm3 360 330 306 259     Results from last 7 days   Lab Units 01/04/24  1048 01/04/24  0016 01/03/24  0334 01/02/24  0626 01/01/24  0745   SODIUM mmol/L  --  139 137 134* 142   POTASSIUM mmol/L 4.6 4.4 4.9 4.5 4.4   CHLORIDE mmol/L  --  103 102 103 106   CO2 mmol/L  --  26.0 26.0 24.0 25.0   BUN mg/dL  --  26* 31* 23* 23*   CREATININE mg/dL  --  0.93 1.01 0.88 1.16   GLUCOSE mg/dL  --  110* 178* 124* 105*   CALCIUM mg/dL  --  9.3 9.5 9.1 9.6         Results from last 7 days   Lab Units 01/01/24  0745   SED RATE mm/hr 27*         Microbiology   Microbiology Results (last 10 days)       Procedure Component Value - Date/Time    Wound Culture - Wound, Knee, Right [[id number]]  (Abnormal) Collected: 01/02/24 1402    Lab Status: Final result Specimen: Wound from Knee, Right Updated: 01/04/24 0715     Wound Culture Light growth (2+) Staphylococcus aureus     Gram Stain Moderate (3+) WBCs per low power field      No organisms seen    Narrative:      Refer to previous wound culture collected on01/02/2024 1400 for MICs        Wound Culture - Wound, Knee, Right [[id number]]  (Abnormal)  (Susceptibility) Collected: 01/02/24 1400    Lab Status: Final result Specimen: Wound from Knee, Right Updated: 01/04/24 0715     Wound Culture Heavy growth (4+) Staphylococcus aureus     Gram Stain Moderate (3+) WBCs per low power field      No organisms seen    Susceptibility        Staphylococcus aureus      TK      Clindamycin Resistant      Erythromycin Resistant      Oxacillin Susceptible      Rifampin Susceptible      Tetracycline Susceptible      Trimethoprim + Sulfamethoxazole Susceptible      Vancomycin Susceptible                       Susceptibility Comments       Staphylococcus aureus    This isolate is presumed to be clindamycin resistant based on detection of inducible clindamycin resistance.  Clindamycin may still be effective in some patients.               Blood Culture - Blood, Arm, Right [[id number]]  (Normal) Collected: 01/01/24 0757    Lab Status: Preliminary result Specimen: Blood from Arm, Right Updated: 01/04/24 0801     Blood Culture No growth at 3 days    Blood Culture - Blood, Arm, Left [[id number]]  (Normal) Collected: 01/01/24 0745    Lab Status: Preliminary result Specimen: Blood from Arm, Left Updated: 01/04/24 0801     Blood Culture No growth at 3 days    MRSA Screen, PCR (Inpatient) - Swab, Nares [[id number]]  (Normal) Collected: 01/01/24 0745    Lab Status: Final result Specimen: Swab from Nares Updated: 01/01/24 0907     MRSA PCR No MRSA Detected    Narrative:      The negative predictive value of this diagnostic test is high and should only be used to consider de-escalating anti-MRSA therapy. A positive result may indicate colonization with MRSA and must be correlated clinically.            Medication Review:       Schedule Meds  amLODIPine, 10 mg, Oral, Daily  aspirin, 81 mg, Oral, BID  cefTRIAXone, 2,000 mg, Intravenous, Daily  dexAMETHasone, 2 mg, Intravenous, Q8H  enoxaparin, 40 mg, Subcutaneous, Daily  lisinopril, 40  mg, Oral, Q24H  methocarbamol, 750 mg, Oral, 4x Daily  senna-docusate sodium, 2 tablet, Oral, BID  senna-docusate sodium, 2 tablet, Oral, BID  sodium chloride, 10 mL, Intravenous, Q12H  sodium chloride, 10 mL, Intravenous, Q12H        Infusion Meds  sodium chloride, 1,000 mL, Last Rate: Stopped (01/03/24 1417)        PRN Meds    acetaminophen    senna-docusate sodium **AND** polyethylene glycol **AND** bisacodyl **AND** bisacodyl    senna-docusate sodium **AND** polyethylene glycol **AND** bisacodyl **AND** bisacodyl    HYDROcodone-acetaminophen    ketorolac    nitroglycerin    ondansetron **OR** [DISCONTINUED] ondansetron    [COMPLETED] Insert Peripheral IV **AND** sodium chloride    sodium chloride    sodium chloride    sodium chloride    sodium chloride    traMADol        Assessment & Plan       Antimicrobial Therapy   1.  IV ceftriaxone        2.  IV vancomycin        3.        4.        5.            Assessment     Right native knee infection in patient who is immunocompetent.  The patient is s/p I&D and washout on January 2, 2024.  Intraoperative cultures are methicillin susceptible Staphylococcus aureus     S/p right knee arthroscopy on December 22, 2023.      Leukocytosis.  Trending up but likely reactive to surgery.  Patient denies diarrhea        Plan     Continue IV ceftriaxone 2 g every 24 hours-patient will need 6 weeks of antimicrobial therapy-last day on 2/12/2023  Patient will need a PICC line before discharge-please remove when IV antibiotics are completed.  Standard weekly PICC line care  Weekly labs CBC, creatinine and sed rate x 6 weeks  Continue supportive care  Case discussed with case management  Case discussed with patient and family member at [facility]  Ambulatory care orders for labs and PICC line care been entered  Not much more to add from infectious disease standpoint-we will sign off at this time-please call with any questions.    Please fax all post discharge lab results, imaging studies  and correspondence to this fax number [phone]  For any question or concern please contact our service number [phone]         JAZ Tripp  24  15:14 EST    Note is dictated utilizing voice recognition software/Dragon    Electronically signed by Sofia Townsend APRN at 24 1521       Trang Mann MD at 24 1037            Expand All Collapse [facility]        Patient Name: Damien Avalos  : 1965  MRN: [id number]  Primary Care Physician:  Brian Jones MD  Date of admission: 2024        Subjective   Subjective      Chief Complaint: Lrknee pain started Thursday     HPI:     Damien Avalos is a 58 y.o. male 58-year-old gentleman presented to the hospital with lrknee pain since Thursday evening progressively getting worse despite conservative treatment including rest and elevation  Pain worsening with range of motion  He did have some bodyaches and chills  Patient had aspiration of his left knee by orthopedic and was found to have cloudy aspiration culture  Patient was started on antibiotic and steroids on Friday and was sent to the hospital for increasing pain and swelling  Patient has been followed up per infectious disease and is on IV antibiotic     Review of Systems              Negative and for left knee pain  Generalized body aches  Chills  Most of the symptoms are resolved at this point rest of 14 system reviewed and negative      Patient is doing fine no new complaint  Had 6 beat run of V. tach  On IV antibiotic with vancomycin  Follow-up per orthopedic and ID  I will check his potassium and magnesium level  Discharge planning once cleared per ID and Ortho     Personal History      Medical History        Past Medical History:   Diagnosis Date    Hypertension              Surgical History         Past Surgical History:   Procedure Laterality Date    KNEE ARTHROSCOPY Right 2024     Procedure: KNEE ARTHROSCOPY;  Surgeon:  Casey Kwan MD;  Location: [facility] MAIN OR;  Service: Orthopedics;  Laterality: Right;    KNEE INCISION AND DRAINAGE Right 1/2/2024     Procedure: KNEE INCISION AND DRAINAGE;  Surgeon: Casey Kwan MD;  Location: [facility] MAIN OR;  Service: Orthopedics;  Laterality: Right;    SHOULDER SURGERY         right shoulder-slap tear            Family History: family history includes Heart disease in his mother. Otherwise pertinent FHx was reviewed and not pertinent to current issue.     Social History:  reports that he has never smoked. He has never used smokeless tobacco. He reports that he does not drink alcohol and does not use drugs.     Home Medications:  acetaminophen, amLODIPine, aspirin, benazepril, meloxicam, methylPREDNISolone, multivitamin with minerals, sulfamethoxazole-trimethoprim, and traMADol     Allergies:  No Known Allergies           Objective   Objective      Vitals:   Temp:  [97.2 °F (36.2 °C)-97.9 °F (36.6 °C)] 97.9 °F (36.6 °C)  Heart Rate:  [60-83] 64  Resp:  [10-16] 16  BP: (111-143)/(64-86) 143/86  Flow (L/min):  [1-6] 2  Physical Exam                         Constitutional: Awake, alert              Eyes: PERRLA, sclerae anicteric, no conjunctival injection              HENT: NCAT, mucous membranes moist              Neck: Supple, no thyromegaly, no lymphadenopathy, trachea midline              Respiratory: Clear to auscultation bilaterally, nonlabored respirations               Cardiovascular: RRR, no murmurs, rubs, or gallops, palpable pedal pulses bilaterally              Gastrointestinal: Positive bowel sounds, soft, nontender, nondistended              Musculoskeletal: No bilateral ankle edema, no clubbing or cyanosis to extremities              Psychiatric: Appropriate affect, cooperative              Neurologic: Oriented x 3, strength symmetric in all extremities, Cranial Nerves grossly intact to confrontation, speech clear              Skin: No rashes            Result  Review   Result Review:  I have personally reviewed the results from the time of this admission to 1/3/2024 14:13 EST and agree with these findings:  [x]  Laboratory list / accordion  []  Microbiology  []  Radiology  []  EKG/Telemetry   []  Cardiology/Vascular   []  Pathology  []  Old records  []  Other:  Most notable findings include:               Assessment & Plan  Assessment / Plan      Brief Patient Summary:  Damien Avalos is a 58 y.o. male who is admitted with lrknee pain  Patient status post right knee arthroscopy and irrigation debridement and synovectomy     Patient has been followed up per infectious disease and orthopedic  Active Hospital Problems:       Active Hospital Problems     Diagnosis      **Cellulitis        Plan:   Patient with right knee infection  He is immunocompetent  Status post I&D and washout on January 2  Intraoperative cultures are pending  Patient status post right knee arthroscopy on December 2 due to meniscal tear  Continue IV ceftriaxone 2 g IV every 24 hours  Start IV vancomycin  Awaiting on intraoperative culture  Follow-up per ID and orthopedic     DVT prophylaxis:  Medical and mechanical DVT prophylaxis orders are present.     CODE STATUS:    Code Status (Patient has no pulse and is not breathing): CPR (Attempt to Resuscitate)  Medical Interventions (Patient has pulse or is breathing): Full Support     Admission Status:  I believe this patient meets ip status.     Trang Mann MD          Electronically signed by Trang Mann MD at 01/04/24 1038          Consult Notes (last 24 hours)        Jon Mcqueen MD at 01/04/24 1711        Consult Orders    1. Inpatient Cardiology Consult [[id number]] ordered by Trang Mann MD at 01/04/24 1446                 CARDIOLOGY CONSULT:    Damien Avalos  1965  male  [id number]      Referring Provider: Hospitalist  Reason for Consultation: Cellulitis and V. tach    Patient Care Team:  Brian Jones MD as PCP -  General    Chief complaint cellulitis and V. tach    Subjective .     History of present illness:  Damien Avalos is a 58 y.o. male with history of hypertension and recent knee surgery status post infection presented to the hospital with complaints of swelling of the knee with redness and was noted to have cellulitis.  Infectious disease outpatient and placed him on IV antibiotics but in the meantime he had an episode of nonsustained ventricular tachycardia and hence a cardiology consult is called.  Patient does not have any symptoms of chest pain or shortness of breath.  No complains of any PND orthopnea.  No palpitation dizziness syncope.    Review of Systems   Constitutional: Negative for fever and malaise/fatigue.   HENT:  Negative for ear pain and nosebleeds.    Eyes:  Negative for blurred vision and double vision.   Cardiovascular:  Negative for chest pain, dyspnea on exertion and palpitations.   Respiratory:  Negative for cough and shortness of breath.    Skin:  Negative for rash.   Musculoskeletal:  Negative for joint pain.   Gastrointestinal:  Negative for abdominal pain, nausea and vomiting.   Neurological:  Negative for focal weakness and headaches.   Psychiatric/Behavioral:  Negative for depression. The patient is not nervous/anxious.    All other systems reviewed and are negative.      History  Past Medical History:   Diagnosis Date    Hypertension        Past Surgical History:   Procedure Laterality Date    KNEE ARTHROSCOPY Right 1/2/2024    Procedure: KNEE ARTHROSCOPY;  Surgeon: Casey Kwan MD;  Location: [facility];  Service: Orthopedics;  Laterality: Right;    KNEE INCISION AND DRAINAGE Right 1/2/2024    Procedure: KNEE INCISION AND DRAINAGE;  Surgeon: Casey Kwan MD;  Location: [facility] OR;  Service: Orthopedics;  Laterality: Right;    SHOULDER SURGERY      right shoulder-slap tear       Family History   Problem Relation Age of Onset    Heart disease Mother        Social  History     Tobacco Use    Smoking status: Never    Smokeless tobacco: Never   Vaping Use    Vaping Use: Never used   Substance Use Topics    Alcohol use: Never    Drug use: Never        Medications Prior to Admission   Medication Sig Dispense Refill Last Dose    acetaminophen (TYLENOL) 325 MG tablet Take 2 tablets by mouth Every 6 (Six) Hours As Needed for Mild Pain.       amLODIPine (NORVASC) 10 MG tablet Take 1 tablet by mouth Daily. 90 tablet 3 2024    aspirin 81 MG EC tablet Take 2 tablets by mouth Daily.   2024    benazepril (LOTENSIN) 40 MG tablet Take 1 tablet by mouth Daily. 90 tablet 3 2024    meloxicam (MOBIC) 15 MG tablet Take 1 tablet by mouth Daily.   2023    [] methylPREDNISolone (MEDROL) 4 MG dose pack Take  by mouth 2 (Two) Times a Day. Take as directed on package instructions.   2024    Multiple Vitamins-Minerals (MULTIVITAMIN ADULT) tablet Take 1 tablet by mouth Daily.   2024    sulfamethoxazole-trimethoprim (BACTRIM DS,SEPTRA DS) 800-160 MG per tablet Take 1 tablet by mouth 2 (Two) Times a Day.   2024    traMADol (ULTRAM) 50 MG tablet Take 1 tablet by mouth Every 6 (Six) Hours As Needed for Moderate Pain.          Allergies: Patient has no known allergies.    Scheduled Meds:amLODIPine, 10 mg, Oral, Daily  aspirin, 81 mg, Oral, BID  cefTRIAXone, 2,000 mg, Intravenous, Daily  dexAMETHasone, 2 mg, Intravenous, Q8H  enoxaparin, 40 mg, Subcutaneous, Daily  lisinopril, 40 mg, Oral, Q24H  methocarbamol, 750 mg, Oral, 4x Daily  senna-docusate sodium, 2 tablet, Oral, BID  senna-docusate sodium, 2 tablet, Oral, BID  sodium chloride, 10 mL, Intravenous, Q12H  sodium chloride, 10 mL, Intravenous, Q12H      Continuous Infusions:sodium chloride, 1,000 mL, Last Rate: Stopped (24 1417)      PRN Meds:.  acetaminophen    senna-docusate sodium **AND** polyethylene glycol **AND** bisacodyl **AND** bisacodyl    senna-docusate sodium **AND** polyethylene glycol **AND**  "bisacodyl **AND** bisacodyl    HYDROcodone-acetaminophen    ketorolac    nitroglycerin    ondansetron **OR** [DISCONTINUED] ondansetron    [COMPLETED] Insert Peripheral IV **AND** sodium chloride    sodium chloride    sodium chloride    sodium chloride    sodium chloride    traMADol    Objective     VITAL SIGNS  Vitals:    01/04/24 0609 01/04/24 0900 01/04/24 1019 01/04/24 1511   BP: 125/84  129/77 132/85   BP Location: Right arm  Right arm Left arm   Patient Position: Lying  Lying Lying   Pulse: 60 76 68 70   Resp: 18  20 16   Temp: 97.3 °F (36.3 °C)  98.1 °F (36.7 °C) 98 °F (36.7 °C)   TempSrc: Temporal  Oral Oral   SpO2: 94%  95% 94%   Weight:       Height:           Flowsheet Rows      Flowsheet Row First Filed Value   Admission Height 172.7 cm (68\") Documented at 01/01/2024 0658   Admission Weight 105 kg (231 lb 11.3 oz) Documented at 01/01/2024 0658             TELEMETRY: Sinus rhythm with nonspecific ST segment abnormality    Physical Exam:  Constitutional:       Appearance: Well-developed.   Eyes:      General: No scleral icterus.     Conjunctiva/sclera: Conjunctivae normal.      Pupils: Pupils are equal, round, and reactive to light.   HENT:      Head: Normocephalic and atraumatic.   Neck:      Vascular: No carotid bruit or JVD.   Pulmonary:      Effort: Pulmonary effort is normal.      Breath sounds: Normal breath sounds. No wheezing. No rales.   Cardiovascular:      Normal rate. Regular rhythm.   Pulses:     Intact distal pulses.   Abdominal:      General: Bowel sounds are normal.      Palpations: Abdomen is soft.   Musculoskeletal: Normal range of motion.      Cervical back: Normal range of motion and neck supple. Skin:     General: Skin is warm and dry.      Findings: No rash.   Neurological:      Mental Status: Alert.      Comments: No focal deficits          Results Review:   I reviewed the patient's new clinical results.  Lab Results (last 24 hours)       Procedure Component Value Units Date/Time    " Potassium [[id number]]  (Normal) Collected: 01/04/24 1048    Specimen: Blood from Arm, Right Updated: 01/04/24 1116     Potassium 4.6 mmol/L     Magnesium [[id number]]  (Normal) Collected: 01/04/24 1048    Specimen: Blood from Arm, Right Updated: 01/04/24 1116     Magnesium 2.2 mg/dL     Blood Culture - Blood, Arm, Right [[id number]]  (Normal) Collected: 01/01/24 0757    Specimen: Blood from Arm, Right Updated: 01/04/24 0801     Blood Culture No growth at 3 days    Blood Culture - Blood, Arm, Left [[id number]]  (Normal) Collected: 01/01/24 0745    Specimen: Blood from Arm, Left Updated: 01/04/24 0801     Blood Culture No growth at 3 days    Wound Culture - Wound, Knee, Right [[id number]]  (Abnormal)  (Susceptibility) Collected: 01/02/24 1400    Specimen: Wound from Knee, Right Updated: 01/04/24 0715     Wound Culture Heavy growth (4+) Staphylococcus aureus     Gram Stain Moderate (3+) WBCs per low power field      No organisms seen    Susceptibility        Staphylococcus aureus      TK      Clindamycin Resistant      Erythromycin Resistant      Oxacillin Susceptible      Rifampin Susceptible      Tetracycline Susceptible      Trimethoprim + Sulfamethoxazole Susceptible      Vancomycin Susceptible                       Susceptibility Comments       Staphylococcus aureus    This isolate is presumed to be clindamycin resistant based on detection of inducible clindamycin resistance.  Clindamycin may still be effective in some patients.               Wound Culture - Wound, Knee, Right [[id number]]  (Abnormal) Collected: 01/02/24 1402    Specimen: Wound from Knee, Right Updated: 01/04/24 0715     Wound Culture Light growth (2+) Staphylococcus aureus     Gram Stain Moderate (3+) WBCs per low power field      No organisms seen    Narrative:      Refer to previous wound culture collected on01/02/2024 1400 for MICs       Vancomycin, Trough [[id number]]  (Normal) Collected: 01/04/24 0521    Specimen: Blood from Arm, Right Updated:  01/04/24 0635     Vancomycin Trough 8.20 mcg/mL     Narrative:      Therapeutic Ranges for Vancomycin    Vancomycin Random   5.0-40.0 mcg/mL  Vancomycin Trough   5.0-20.0 mcg/mL  Vancomycin Peak     20.0-40.0 mcg/mL    CBC & Differential [[id number]]  (Abnormal) Collected: 01/04/24 0521    Specimen: Blood from Arm, Right Updated: 01/04/24 0617    Narrative:      The following orders were created for panel order CBC & Differential.  Procedure                               Abnormality         Status                     ---------                               -----------         ------                     CBC Auto Differential[[id number]]        Abnormal            Final result                 Please view results for these tests on the individual orders.    CBC Auto Differential [[id number]]  (Abnormal) Collected: 01/04/24 0521    Specimen: Blood from Arm, Right Updated: 01/04/24 0617     WBC 15.20 10*3/mm3      RBC 4.66 10*6/mm3      Hemoglobin 14.3 g/dL      Hematocrit 42.8 %      MCV 91.7 fL      MCH 30.7 pg      MCHC 33.5 g/dL      RDW 13.5 %      RDW-SD 43.3 fl      MPV 7.8 fL      Platelets 360 10*3/mm3      Neutrophil % 81.9 %      Lymphocyte % 11.2 %      Monocyte % 6.4 %      Eosinophil % 0.2 %      Basophil % 0.3 %      Neutrophils, Absolute 12.40 10*3/mm3      Lymphocytes, Absolute 1.70 10*3/mm3      Monocytes, Absolute 1.00 10*3/mm3      Eosinophils, Absolute 0.00 10*3/mm3      Basophils, Absolute 0.00 10*3/mm3      nRBC 0.0 /100 WBC     Vancomycin, Peak [[id number]]  (Abnormal) Collected: 01/04/24 0016    Specimen: Blood from Arm, Right Updated: 01/04/24 0051     Vancomycin Peak 13.00 mcg/mL     Narrative:      Therapeutic Ranges for Vancomycin    Vancomycin Random   5.0-40.0 mcg/mL  Vancomycin Trough   5.0-20.0 mcg/mL  Vancomycin Peak     20.0-40.0 mcg/mL    Basic Metabolic Panel [[id number]]  (Abnormal) Collected: 01/04/24 0016    Specimen: Blood from Arm, Right Updated: 01/04/24 0051     Glucose 110 mg/dL       BUN 26 mg/dL      Creatinine 0.93 mg/dL      Sodium 139 mmol/L      Potassium 4.4 mmol/L      Chloride 103 mmol/L      CO2 26.0 mmol/L      Calcium 9.3 mg/dL      BUN/Creatinine Ratio 28.0     Anion Gap 10.0 mmol/L      eGFR 95.2 mL/min/1.73     Narrative:      GFR Normal >60  Chronic Kidney Disease <60  Kidney Failure <15      Vancomycin, Trough [[id number]]  (Normal) Collected: 01/03/24 2225    Specimen: Blood Updated: 01/03/24 2302     Vancomycin Trough 14.40 mcg/mL     Narrative:      Therapeutic Ranges for Vancomycin    Vancomycin Random   5.0-40.0 mcg/mL  Vancomycin Trough   5.0-20.0 mcg/mL  Vancomycin Peak     20.0-40.0 mcg/mL            Imaging Results (Last 24 Hours)       ** No results found for the last 24 hours. **            EKG      I personally viewed and interpreted the patient's EKG/Telemetry data:    ECHOCARDIOGRAM:         STRESS MYOVIEW:       CARDIAC CATHETERIZATION:    No results found for this or any previous visit.       OTHER:         MDM      Ventricular tachycardia  Patient had an episode of nonsustained ventricular tachycardia and hence he will be ruled out for MI by EKG and enzymes  Patient will have an echocardiogram for LV function valve abnormalities  Patient will also need a stress Myoview study to rule out any ischemia  Patient's electrolytes will be checked.  If his LV function is normal then I will place him on beta-blockers if the stress test is negative    Status post knee surgery with a cellulitis and wound infection with staph  Patient is currently seen by infectious specialist and is on IV antibiotics  Patient also has a PICC line through which he is getting antibiotics but his blood cultures are negative.    Hypertension  Patient is currently on amlodipine but I will change it to beta-blockers once the echo and stress test are complete     I discussed the patients findings and my recommendations with patient and nurse    Jon Mcqueen MD  01/04/24  17:11  EST              Electronically signed by Jon Mcqueen MD at 01/04/24 1792

## 2024-01-05 NOTE — PROGRESS NOTES
[facility]        Patient Name: Damien Avalos  : 1965  MRN: [id number]  Primary Care Physician:  Brian Jones MD  Date of admission: 2024        Subjective   Subjective      Chief Complaint: Lrknee pain started Thursday     HPI:     Damien Avalos is a 58 y.o. male 58-year-old gentleman presented to the hospital with lrknee pain since Thursday evening progressively getting worse despite conservative treatment including rest and elevation  Pain worsening with range of motion  He did have some bodyaches and chills  Patient had aspiration of his left knee by orthopedic and was found to have cloudy aspiration culture  Patient was started on antibiotic and steroids on Friday and was sent to the hospital for increasing pain and swelling  Patient has been followed up per infectious disease and is on IV antibiotic     Review of Systems              Negative and for left knee pain  Generalized body aches  Chills  Most of the symptoms are resolved at this point rest of 14 system reviewed and negative      Patient is doing fine no new complaint  Had 6 beat run of V. tach  On IV antibiotic with vancomycin  Follow-up per orthopedic and ID  I will check his potassium and magnesium level  Discharge planning once cleared per ID and Ortho     Patient doing fine  Seen per cardiology for 6 beat V. tach  Awaiting echo and possible stress test  Patient wants to go home  Discharge planning once cleared per cardiology and ID  Patient on IV ceftriaxone and vancomycin  Status post I&D and washout on  with intraoperative culture positive for MRSA  His white count is down to 15 from 20 yesterday  Personal History      Medical History        Past Medical History:   Diagnosis Date    Hypertension              Surgical History         Past Surgical History:   Procedure Laterality Date    KNEE ARTHROSCOPY Right 2024     Procedure: KNEE ARTHROSCOPY;  Surgeon: Aquiles  Casey Taylor MD;  Location: [facility] MAIN OR;  Service: Orthopedics;  Laterality: Right;    KNEE INCISION AND DRAINAGE Right 1/2/2024     Procedure: KNEE INCISION AND DRAINAGE;  Surgeon: Casey Kwan MD;  Location: [facility] MAIN OR;  Service: Orthopedics;  Laterality: Right;    SHOULDER SURGERY         right shoulder-slap tear            Family History: family history includes Heart disease in his mother. Otherwise pertinent FHx was reviewed and not pertinent to current issue.     Social History:  reports that he has never smoked. He has never used smokeless tobacco. He reports that he does not drink alcohol and does not use drugs.     Home Medications:  acetaminophen, amLODIPine, aspirin, benazepril, meloxicam, methylPREDNISolone, multivitamin with minerals, sulfamethoxazole-trimethoprim, and traMADol     Allergies:  No Known Allergies           Objective   Objective      Vitals:   Temp:  [97.2 °F (36.2 °C)-97.9 °F (36.6 °C)] 97.9 °F (36.6 °C)  Heart Rate:  [60-83] 64  Resp:  [10-16] 16  BP: (111-143)/(64-86) 143/86  Flow (L/min):  [1-6] 2  Physical Exam                         Constitutional: Awake, alert              Eyes: PERRLA, sclerae anicteric, no conjunctival injection              HENT: NCAT, mucous membranes moist              Neck: Supple, no thyromegaly, no lymphadenopathy, trachea midline              Respiratory: Clear to auscultation bilaterally, nonlabored respirations               Cardiovascular: RRR, no murmurs, rubs, or gallops, palpable pedal pulses bilaterally              Gastrointestinal: Positive bowel sounds, soft, nontender, nondistended              Musculoskeletal: No bilateral ankle edema, no clubbing or cyanosis to extremities              Psychiatric: Appropriate affect, cooperative              Neurologic: Oriented x 3, strength symmetric in all extremities, Cranial Nerves grossly intact to confrontation, speech clear              Skin: No rashes            Result Review    Result Review:  I have personally reviewed the results from the time of this admission to 1/3/2024 14:13 EST and agree with these findings:  [x]  Laboratory list / accordion  []  Microbiology  []  Radiology  []  EKG/Telemetry   []  Cardiology/Vascular   []  Pathology  []  Old records  []  Other:  Most notable findings include:               Assessment & Plan  Assessment / Plan      Brief Patient Summary:  Damien Avalos is a 58 y.o. male who is admitted with lrknee pain  Patient status post right knee arthroscopy and irrigation debridement and synovectomy     Patient has been followed up per infectious disease and orthopedic  Active Hospital Problems:       Active Hospital Problems     Diagnosis      **Cellulitis        Plan:   Patient with right knee infection  He is immunocompetent  Status post I&D and washout on January 2  Intraoperative cultures are pending  Patient status post right knee arthroscopy on December 2 due to meniscal tear  Continue IV ceftriaxone 2 g IV every 24 hours  Start IV vancomycin  Awaiting on intraoperative culture  Follow-up per ID and orthopedic     DVT prophylaxis:  Medical and mechanical DVT prophylaxis orders are present.     CODE STATUS:    Code Status (Patient has no pulse and is not breathing): CPR (Attempt to Resuscitate)  Medical Interventions (Patient has pulse or is breathing): Full Support     Admission Status:  I believe this patient meets ip status.     Trang Mann MD

## 2024-01-05 NOTE — PLAN OF CARE
Problem: Adult Inpatient Plan of Care  Goal: Plan of Care Review  Outcome: Ongoing, Progressing  Goal: Patient-Specific Goal (Individualized)  Outcome: Ongoing, Progressing  Goal: Absence of Hospital-Acquired Illness or Injury  Outcome: Ongoing, Progressing  Intervention: Identify and Manage Fall Risk  Recent Flowsheet Documentation  Taken 1/5/2024 0000 by Mic Mitchell RN  Safety Promotion/Fall Prevention: safety round/check completed  Taken 1/4/2024 2000 by Mic Mitchell RN  Safety Promotion/Fall Prevention: safety round/check completed  Intervention: Prevent Skin Injury  Recent Flowsheet Documentation  Taken 1/5/2024 0000 by Mic Mitchell RN  Body Position: position changed independently  Taken 1/4/2024 2000 by Mic Mitchell RN  Body Position: position changed independently  Goal: Optimal Comfort and Wellbeing  Outcome: Ongoing, Progressing  Goal: Readiness for Transition of Care  Outcome: Ongoing, Progressing  Goal: Plan of Care Review  Outcome: Ongoing, Progressing  Goal: Patient-Specific Goal (Individualized)  Outcome: Ongoing, Progressing  Goal: Absence of Hospital-Acquired Illness or Injury  Outcome: Ongoing, Progressing  Intervention: Identify and Manage Fall Risk  Recent Flowsheet Documentation  Taken 1/5/2024 0000 by Mic Mitchell RN  Safety Promotion/Fall Prevention: safety round/check completed  Taken 1/4/2024 2000 by Mic Mitchell RN  Safety Promotion/Fall Prevention: safety round/check completed  Intervention: Prevent Skin Injury  Recent Flowsheet Documentation  Taken 1/5/2024 0000 by Mic Mitchell RN  Body Position: position changed independently  Taken 1/4/2024 2000 by Mic Mitchell RN  Body Position: position changed independently  Goal: Optimal Comfort and Wellbeing  Outcome: Ongoing, Progressing  Goal: Readiness for Transition of Care  Outcome: Ongoing, Progressing     Problem: Pain Acute  Goal: Acceptable Pain Control and Functional Ability  Outcome: Ongoing, Progressing  Intervention: Prevent  or Manage Pain  Recent Flowsheet Documentation  Taken 1/5/2024 0000 by Mic Mitchell, RN  Medication Review/Management: medications reviewed  Taken 1/4/2024 2000 by Mic Mitchell, RN  Medication Review/Management: medications reviewed   Goal Outcome Evaluation:

## 2024-01-06 LAB
BACTERIA SPEC AEROBE CULT: NORMAL
BACTERIA SPEC AEROBE CULT: NORMAL

## 2024-01-06 NOTE — DISCHARGE SUMMARY
[facility]         Patient Name: Damien Avalos  : 1965  MRN: [id number]  Primary Care Physician:  Brian Jones MD  Date of admission: 2024        Subjective   Subjective      Chief Complaint: Lrknee pain started Thursday     HPI:     Damien Avalos is a 58 y.o. male 58-year-old gentleman presented to the hospital with lrknee pain since Thursday evening progressively getting worse despite conservative treatment including rest and elevation  Pain worsening with range of motion  He did have some bodyaches and chills  Patient had aspiration of his left knee by orthopedic and was found to have cloudy aspiration culture  Patient was started on antibiotic and steroids on Friday and was sent to the hospital for increasing pain and swelling  Patient has been followed up per infectious disease and is on IV antibiotic     Review of Systems              Negative and for left knee pain  Generalized body aches  Chills  Most of the symptoms are resolved at this point rest of 14 system reviewed and negative       Patient is doing fine no new complaint  Had 6 beat run of V. tach  On IV antibiotic with vancomycin  Follow-up per orthopedic and ID  I will check his potassium and magnesium level  Discharge planning once cleared per ID and Ortho     Patient doing fine  Seen per cardiology for 6 beat V. tach  Awaiting echo and possible stress test  Patient wants to go home  Discharge planning once cleared per cardiology and ID  Patient on IV ceftriaxone and vancomycin  Status post I&D and washout on  with intraoperative culture positive for MRSA  His white count is down to 15 from 20 yesterday  Personal History      Medical History           Past Medical History:   Diagnosis Date    Hypertension              Surgical History             Past Surgical History:   Procedure Laterality Date    KNEE ARTHROSCOPY Right 2024     Procedure: KNEE ARTHROSCOPY;  Surgeon: Casey Kwan MD;   Location: [facility] MAIN OR;  Service: Orthopedics;  Laterality: Right;    KNEE INCISION AND DRAINAGE Right 1/2/2024     Procedure: KNEE INCISION AND DRAINAGE;  Surgeon: Casey Kwan MD;  Location: [facility] MAIN OR;  Service: Orthopedics;  Laterality: Right;    SHOULDER SURGERY         right shoulder-slap tear            Family History: family history includes Heart disease in his mother. Otherwise pertinent FHx was reviewed and not pertinent to current issue.     Social History:  reports that he has never smoked. He has never used smokeless tobacco. He reports that he does not drink alcohol and does not use drugs.     Home Medications:  acetaminophen, amLODIPine, aspirin, benazepril, meloxicam, methylPREDNISolone, multivitamin with minerals, sulfamethoxazole-trimethoprim, and traMADol     Allergies:  No Known Allergies           Objective   Objective      Vitals:   Temp:  [97.2 °F (36.2 °C)-97.9 °F (36.6 °C)] 97.9 °F (36.6 °C)  Heart Rate:  [60-83] 64  Resp:  [10-16] 16  BP: (111-143)/(64-86) 143/86  Flow (L/min):  [1-6] 2  Physical Exam                         Constitutional: Awake, alert              Eyes: PERRLA, sclerae anicteric, no conjunctival injection              HENT: NCAT, mucous membranes moist              Neck: Supple, no thyromegaly, no lymphadenopathy, trachea midline              Respiratory: Clear to auscultation bilaterally, nonlabored respirations               Cardiovascular: RRR, no murmurs, rubs, or gallops, palpable pedal pulses bilaterally              Gastrointestinal: Positive bowel sounds, soft, nontender, nondistended              Musculoskeletal: No bilateral ankle edema, no clubbing or cyanosis to extremities              Psychiatric: Appropriate affect, cooperative              Neurologic: Oriented x 3, strength symmetric in all extremities, Cranial Nerves grossly intact to confrontation, speech clear              Skin: No rashes            Result Review   Result Review:  I have  personally reviewed the results from the time of this admission to 1/3/2024 14:13 EST and agree with these findings:  [x]  Laboratory list / accordion  []  Microbiology  []  Radiology  []  EKG/Telemetry   []  Cardiology/Vascular   []  Pathology  []  Old records  []  Other:  Most notable findings include:               Assessment & Plan  Assessment / Plan      Brief Patient Summary:  Damien Avalos is a 58 y.o. male who is admitted with lrknee pain  Patient status post right knee arthroscopy and irrigation debridement and synovectomy     Patient has been followed up per infectious disease and orthopedic  Active Hospital Problems:          Active Hospital Problems     Diagnosis      **Cellulitis        Plan:   Patient with right knee infection  He is immunocompetent  Status post I&D and washout on January 2  Intraoperative cultures are pending  Patient status post right knee arthroscopy on December 2 due to meniscal tear  Continue IV ceftriaxone 2 g IV every 24 hours  Start IV vancomycin  Awaiting on intraoperative culture  Follow-up per ID and orthopedic     DVT prophylaxis:  Medical and mechanical DVT prophylaxis orders are present.     CODE STATUS:    Code Status (Patient has no pulse and is not breathing): CPR (Attempt to Resuscitate)  Medical Interventions (Patient has pulse or is breathing): Full Support     Admission Status:  I believe this patient meets ip status.     Trang Mann MD

## 2024-01-07 LAB
BACTERIA SPEC ANAEROBE CULT: NORMAL
BACTERIA SPEC ANAEROBE CULT: NORMAL

## 2024-01-08 ENCOUNTER — TRANSITIONAL CARE MANAGEMENT TELEPHONE ENCOUNTER (OUTPATIENT)
Dept: CALL CENTER | Facility: HOSPITAL | Age: 59
End: 2024-01-08
Payer: COMMERCIAL

## 2024-01-08 NOTE — OUTREACH NOTE
Call Center TCM Note      Flowsheet Row Responses   [facility] patient discharged from? [facility]   Does the patient have one of the following disease processes/diagnoses(primary or secondary)? General Surgery   TCM attempt successful? Yes   Call start time 0929   Call end time 0936   Discharge diagnosis Right knee effusion with suspected infection, Right knee arthroscopy with irrigation debridement and synovectomy   Person spoke with today (if not patient) and relationship pt   Meds reviewed with patient/caregiver? Yes   Is the patient having any side effects they believe may be caused by any medication additions or changes? No   Does the patient have all medications related to this admission filled (includes all antibiotics, pain medications, etc.) Yes   Is the patient taking all medications as directed (includes completed medication regime)? Yes   Comments Advised to make a post-op/cardiology fu appts   Does the patient have an appointment with their PCP within 7-14 days of discharge? Yes  [1/16/2024 at 2:00 PM]   What is the Home health agency?  [facility] for [facility] abx x 6 weeks   Has home health visited the patient within 72 hours of discharge? Yes   Psychosocial issues? No   Did the patient receive a copy of their discharge instructions? Yes   Nursing interventions Reviewed instructions with patient   What is the patient's perception of their health status since discharge? Improving   Nursing interventions Nurse provided patient education   Is the patient /caregiver able to teach back basic post-op care? Take showers only when approved by MD-sponge bathe until then, No tub bath, swimming, or hot tub until instructed by MD, Keep incision areas clean,dry and protected, Lifting as instructed by MD in discharge instructions   Is the patient/caregiver able to teach back signs and symptoms of incisional infection? Increased redness, swelling or pain at the incisonal site, Increased drainage or bleeding, Incisional  warmth, Pus or odor from incision, Fever   Is the patient/caregiver able to teach back steps to recovery at home? Rest and rebuild strength, gradually increase activity, Eat a well-balance diet   If the patient is a current smoker, are they able to teach back resources for cessation? Not a smoker   Is the patient/caregiver able to teach back the hierarchy of who to call/visit for symptoms/problems? PCP, Specialist, Home health nurse, Urgent Care, ED, 911 Yes   TCM call completed? Yes   Wrap up additional comments Pt states he is doing better today, and denies fever, or increased redness, swelling, drainage at incisional site. Reviewed AVS/meds with pt. Pt to fu with Ambulatory Care for weekly dsg change. Pt verified PCP fu appt, and advised to make a Post-op/Cardiology fu appt.   Call end time 0958   Would this patient benefit from a Referral to [facility] Social Work? No   Is the patient interested in additional calls from an ambulatory ? No            Rowena Bautista RN    1/8/2024, 09:40 EST

## 2024-01-09 DIAGNOSIS — M00.9 INFECTION OF RIGHT KNEE: Primary | ICD-10-CM

## 2024-01-11 ENCOUNTER — HOSPITAL ENCOUNTER (OUTPATIENT)
Dept: INFUSION THERAPY | Facility: HOSPITAL | Age: 59
Discharge: HOME | End: 2024-01-11
Admitting: NURSE PRACTITIONER
Payer: COMMERCIAL

## 2024-01-11 DIAGNOSIS — M00.9 INFECTION OF RIGHT KNEE: ICD-10-CM

## 2024-01-11 LAB
BASOPHILS # BLD AUTO: 0.1 10*3/MM3 (ref 0–0.2)
BASOPHILS NFR BLD AUTO: 0.7 % (ref 0–1.5)
CREAT SERPL-MCNC: 0.94 MG/DL (ref 0.76–1.27)
DEPRECATED RDW RBC AUTO: 41.1 FL (ref 37–54)
EGFRCR SERPLBLD CKD-EPI 2021: 94 ML/MIN/1.73
EOSINOPHIL # BLD AUTO: 0.1 10*3/MM3 (ref 0–0.4)
EOSINOPHIL NFR BLD AUTO: 1.1 % (ref 0.3–6.2)
ERYTHROCYTE [DISTWIDTH] IN BLOOD BY AUTOMATED COUNT: 13 % (ref 12.3–15.4)
ERYTHROCYTE [SEDIMENTATION RATE] IN BLOOD: 49 MM/HR (ref 0–20)
HCT VFR BLD AUTO: 41.1 % (ref 37.5–51)
HGB BLD-MCNC: 13.9 G/DL (ref 13–17.7)
LYMPHOCYTES # BLD AUTO: 2.3 10*3/MM3 (ref 0.7–3.1)
LYMPHOCYTES NFR BLD AUTO: 24.8 % (ref 19.6–45.3)
MCH RBC QN AUTO: 30.8 PG (ref 26.6–33)
MCHC RBC AUTO-ENTMCNC: 33.8 G/DL (ref 31.5–35.7)
MCV RBC AUTO: 91 FL (ref 79–97)
MONOCYTES # BLD AUTO: 0.7 10*3/MM3 (ref 0.1–0.9)
MONOCYTES NFR BLD AUTO: 7.6 % (ref 5–12)
NEUTROPHILS NFR BLD AUTO: 6.1 10*3/MM3 (ref 1.7–7)
NEUTROPHILS NFR BLD AUTO: 65.8 % (ref 42.7–76)
NRBC BLD AUTO-RTO: 0 /100 WBC (ref 0–0.2)
PLATELET # BLD AUTO: 310 10*3/MM3 (ref 140–450)
PMV BLD AUTO: 7.2 FL (ref 6–12)
RBC # BLD AUTO: 4.52 10*6/MM3 (ref 4.14–5.8)
WBC NRBC COR # BLD AUTO: 9.2 10*3/MM3 (ref 3.4–10.8)

## 2024-01-11 PROCEDURE — 82565 ASSAY OF CREATININE: CPT | Performed by: NURSE PRACTITIONER

## 2024-01-11 PROCEDURE — G0463 HOSPITAL OUTPT CLINIC VISIT: HCPCS

## 2024-01-11 PROCEDURE — 36592 COLLECT BLOOD FROM PICC: CPT

## 2024-01-11 PROCEDURE — 85025 COMPLETE CBC W/AUTO DIFF WBC: CPT | Performed by: NURSE PRACTITIONER

## 2024-01-11 PROCEDURE — 85652 RBC SED RATE AUTOMATED: CPT | Performed by: NURSE PRACTITIONER

## 2024-01-15 ENCOUNTER — TELEPHONE (OUTPATIENT)
Dept: CARDIOLOGY | Facility: CLINIC | Age: 59
End: 2024-01-15
Payer: COMMERCIAL

## 2024-01-15 NOTE — TELEPHONE ENCOUNTER
Caller: MADAY PAPPAS    Relationship to patient: Emergency Contact    Best call back number: [phone]     Chief complaint: BP    Type of visit: HFU    Requested date: 01.20.24     If rescheduling, when is the original appointment: NA     Additional notes: PT WIFE REACHING OUT TO SCHEDULE 2 WEEK HFU FOR PT - SHE NOTES HIS BP HAS BEEN GOING UP PROGRESSIVELY SINCE DISCHARGE - SHE NOTES THAT HE SWITCHED HIS MEDICATION TO METOPROLOL AND THEY ARE SEEING IT CONSISTENTLY MOVE UP - WHEN DC, PATIENTS READING /81 WITH A HR 63 AND THE LAST THREE DAYS OF READINGS ARE AS FOLLOWS:     01/13/24  149/91    01/14/24  151/95    01/15/24  151/92

## 2024-01-16 ENCOUNTER — HOSPITAL ENCOUNTER (OUTPATIENT)
Dept: INFUSION THERAPY | Facility: HOSPITAL | Age: 59
Discharge: HOME | End: 2024-01-16
Admitting: NURSE PRACTITIONER
Payer: COMMERCIAL

## 2024-01-16 ENCOUNTER — OFFICE VISIT (OUTPATIENT)
Dept: FAMILY MEDICINE CLINIC | Facility: CLINIC | Age: 59
End: 2024-01-16
Payer: COMMERCIAL

## 2024-01-16 VITALS
SYSTOLIC BLOOD PRESSURE: 138 MMHG | OXYGEN SATURATION: 98 % | WEIGHT: 232 LBS | RESPIRATION RATE: 20 BRPM | DIASTOLIC BLOOD PRESSURE: 88 MMHG | HEART RATE: 66 BPM | BODY MASS INDEX: 35.16 KG/M2 | HEIGHT: 68 IN

## 2024-01-16 DIAGNOSIS — I10 ESSENTIAL HYPERTENSION: Primary | ICD-10-CM

## 2024-01-16 DIAGNOSIS — M00.9 INFECTION OF RIGHT KNEE: ICD-10-CM

## 2024-01-16 PROBLEM — R94.39 ABNORMAL CARDIOVASCULAR STRESS TEST: Status: ACTIVE | Noted: 2024-01-16

## 2024-01-16 LAB
BASOPHILS # BLD AUTO: 0.1 10*3/MM3 (ref 0–0.2)
BASOPHILS NFR BLD AUTO: 1 % (ref 0–1.5)
CREAT SERPL-MCNC: 0.9 MG/DL (ref 0.76–1.27)
DEPRECATED RDW RBC AUTO: 42 FL (ref 37–54)
EGFRCR SERPLBLD CKD-EPI 2021: 98.4 ML/MIN/1.73
EOSINOPHIL # BLD AUTO: 0.1 10*3/MM3 (ref 0–0.4)
EOSINOPHIL NFR BLD AUTO: 1.5 % (ref 0.3–6.2)
ERYTHROCYTE [DISTWIDTH] IN BLOOD BY AUTOMATED COUNT: 12.8 % (ref 12.3–15.4)
ERYTHROCYTE [SEDIMENTATION RATE] IN BLOOD: 52 MM/HR (ref 0–20)
HCT VFR BLD AUTO: 40.3 % (ref 37.5–51)
HGB BLD-MCNC: 13.8 G/DL (ref 13–17.7)
LYMPHOCYTES # BLD AUTO: 2.8 10*3/MM3 (ref 0.7–3.1)
LYMPHOCYTES NFR BLD AUTO: 31.2 % (ref 19.6–45.3)
MCH RBC QN AUTO: 30.2 PG (ref 26.6–33)
MCHC RBC AUTO-ENTMCNC: 34.3 G/DL (ref 31.5–35.7)
MCV RBC AUTO: 87.9 FL (ref 79–97)
MONOCYTES # BLD AUTO: 0.7 10*3/MM3 (ref 0.1–0.9)
MONOCYTES NFR BLD AUTO: 8.1 % (ref 5–12)
NEUTROPHILS NFR BLD AUTO: 5.1 10*3/MM3 (ref 1.7–7)
NEUTROPHILS NFR BLD AUTO: 58.2 % (ref 42.7–76)
NRBC BLD AUTO-RTO: 0.1 /100 WBC (ref 0–0.2)
PLATELET # BLD AUTO: 325 10*3/MM3 (ref 140–450)
PMV BLD AUTO: 7.6 FL (ref 6–12)
RBC # BLD AUTO: 4.58 10*6/MM3 (ref 4.14–5.8)
WBC NRBC COR # BLD AUTO: 8.8 10*3/MM3 (ref 3.4–10.8)

## 2024-01-16 PROCEDURE — 99495 TRANSJ CARE MGMT MOD F2F 14D: CPT | Performed by: FAMILY MEDICINE

## 2024-01-16 PROCEDURE — 36592 COLLECT BLOOD FROM PICC: CPT

## 2024-01-16 PROCEDURE — 85025 COMPLETE CBC W/AUTO DIFF WBC: CPT | Performed by: NURSE PRACTITIONER

## 2024-01-16 PROCEDURE — 82565 ASSAY OF CREATININE: CPT | Performed by: NURSE PRACTITIONER

## 2024-01-16 PROCEDURE — G0463 HOSPITAL OUTPT CLINIC VISIT: HCPCS

## 2024-01-16 PROCEDURE — 85652 RBC SED RATE AUTOMATED: CPT | Performed by: NURSE PRACTITIONER

## 2024-01-16 RX ORDER — CELECOXIB 200 MG/1
1 CAPSULE ORAL DAILY
COMMUNITY
Start: 2024-01-12

## 2024-01-16 NOTE — PROGRESS NOTES
Subjective   Damien Avalos is a 59 y.o. male.   No chief complaint on file.      History of Present Illness  Damien Avalos is a 58 y.o. male 58-year-old gentleman presented to the hospital with R knee pain 12/27. History significant for arthroscopy 12/22/23 progressively getting worse despite conservative treatment including rest and elevation  Pain worsening with range of motion  He did have some bodyaches and chills  Patient had aspiration of his left knee by orthopedic and was found to have cloudy aspiration culture  Patient was started on antibiotic and steroids on Friday and was sent to the hospital for increasing pain and swelling  Patient has been followed up per infectious disease and is on IV antibiotic    Pt has 6 weeks of IV antibiotics total. Has about 4 weeks left.  No fever or redness. Still swelled some, stiff    Damien Avalos is a 59 y.o.male who presents for a transitional care management visit.    Within 48 business hours after discharge our office contacted him via telephone to coordinate his care and needs.      I reviewed and discussed the details of that call along with the discharge summary, hospital problems, inpatient lab results, inpatient diagnostic studies, and consultation reports with Damien.     Current outpatient and discharge medications have been reconciled for the patient.  Reviewed by: Brian Jones MD     Risk for Readmission (LACE) Score: 7 (1/5/2024  6:00 AM)     BP creeping up despite metoprolol and benazepril. 150s/90s at home      The following portions of the patient's history were reviewed and updated as appropriate: allergies, current medications, past family history, past medical history, past social history, past surgical history, and problem list.    Patient Active Problem List   Diagnosis   • Arthralgia   • Dyslipidemia   • Essential hypertension   • Cellulitis   • Infection of right knee   • Abnormal cardiovascular stress test       Current Outpatient  Medications on File Prior to Visit   Medication Sig Dispense Refill   • acetaminophen (TYLENOL) 325 MG tablet Take 2 tablets by mouth Every 6 (Six) Hours As Needed for Mild Pain.     • aspirin 81 MG EC tablet Take 2 tablets by mouth Daily.     • benazepril (LOTENSIN) 40 MG tablet Take 1 tablet by mouth Daily. 90 tablet 3   • celecoxib (CeleBREX) 200 MG capsule Take 1 capsule by mouth Daily.     • metoprolol succinate XL (TOPROL-XL) 25 MG 24 hr tablet Take 1 tablet by mouth Daily. 30 tablet 0   • Multiple Vitamins-Minerals (MULTIVITAMIN ADULT) tablet Take 1 tablet by mouth Daily.     • traMADol (ULTRAM) 50 MG tablet Take 1 tablet by mouth Every 6 (Six) Hours As Needed for Moderate Pain.     • [DISCONTINUED] amLODIPine (NORVASC) 10 MG tablet Take 1 tablet by mouth Daily. 90 tablet 3   • [DISCONTINUED] meloxicam (MOBIC) 15 MG tablet Take 1 tablet by mouth Daily.       No current facility-administered medications on file prior to visit.     Current outpatient and discharge medications have been reconciled for the patient.  Reviewed by: Brian Jones MD      No Known Allergies    Review of Systems   Constitutional:  Negative for activity change, appetite change, fatigue and fever.   HENT:  Negative for ear pain, swollen glands and voice change.    Eyes:  Negative for visual disturbance.   Respiratory:  Negative for shortness of breath and wheezing.    Cardiovascular:  Negative for chest pain and leg swelling.   Gastrointestinal:  Negative for abdominal pain, blood in stool, constipation, diarrhea, nausea and vomiting.   Endocrine: Negative for polydipsia and polyuria.   Genitourinary:  Negative for dysuria, frequency and hematuria.   Musculoskeletal:  Negative for joint swelling, neck pain and neck stiffness.   Skin:  Negative for rash and wound.   Neurological:  Negative for weakness, numbness and headache.   Psychiatric/Behavioral:  Negative for suicidal ideas and depressed mood.      I have reviewed and  "confirmed the accuracy of the ROS as documented by the MA/LPN/RN Brian Jones MD    Objective   Visit Vitals  /88 (BP Location: Right arm, Patient Position: Sitting, Cuff Size: Large Adult)   Pulse 66   Resp 20   Ht 172.7 cm (67.99\")   Wt 105 kg (232 lb)   SpO2 98%   BMI 35.28 kg/m²      **  Physical Exam  Constitutional:       Appearance: He is well-developed.   HENT:      Head: Normocephalic and atraumatic.      Right Ear: External ear normal.      Left Ear: External ear normal.      Nose: Nose normal.   Eyes:      Pupils: Pupils are equal, round, and reactive to light.   Cardiovascular:      Rate and Rhythm: Normal rate and regular rhythm.      Heart sounds: Normal heart sounds.   Pulmonary:      Effort: Pulmonary effort is normal.      Breath sounds: Normal breath sounds.   Abdominal:      General: Bowel sounds are normal.      Palpations: Abdomen is soft.   Musculoskeletal:         General: Normal range of motion.      Cervical back: Normal range of motion and neck supple.   Skin:     General: Skin is warm and dry.   Neurological:      Mental Status: He is alert and oriented to person, place, and time.   Psychiatric:         Behavior: Behavior normal.         Thought Content: Thought content normal.         Judgment: Judgment normal.   Derm Physical Exam  Ortho Exam   Neurologic Exam     Mental Status   Oriented to person, place, and time.     Cranial Nerves     CN III, IV, VI   Pupils are equal, round, and reactive to light.       Diagnoses and all orders for this visit:    1. Essential hypertension (Primary)  Overview:  stable    Assessment & Plan:  Start amlodipine 5 mg, watch BP at home       Findings discussed. All questions answered. Continue benazepril and metoprolol, start norvasc 5 mg qd. Increase to 10 if BP not better in about 2 weeks   Medication and medication adverse effects discussed.  Drug education given and explained to patient. Patient verbalized understanding.    Class 2 " Severe Obesity (BMI >=35 and <=39.9). Obesity-related health conditions include the following: hypertension and dyslipidemias. Obesity is unchanged. BMI is is above average; BMI management plan is completed. We discussed portion control and increasing exercise.      Expected course, medications, and adverse effects discussed as appropriate.  Call or return if worsening or persistent symptoms.     This document is intended for medical professional use only.

## 2024-01-23 ENCOUNTER — HOSPITAL ENCOUNTER (OUTPATIENT)
Dept: INFUSION THERAPY | Facility: HOSPITAL | Age: 59
Discharge: HOME | End: 2024-01-23
Admitting: NURSE PRACTITIONER
Payer: COMMERCIAL

## 2024-01-23 ENCOUNTER — TRANSCRIBE ORDERS (OUTPATIENT)
Dept: ADMINISTRATIVE | Facility: HOSPITAL | Age: 59
End: 2024-01-23
Payer: COMMERCIAL

## 2024-01-23 VITALS
SYSTOLIC BLOOD PRESSURE: 122 MMHG | TEMPERATURE: 98.6 F | HEART RATE: 69 BPM | DIASTOLIC BLOOD PRESSURE: 78 MMHG | RESPIRATION RATE: 12 BRPM | OXYGEN SATURATION: 96 %

## 2024-01-23 DIAGNOSIS — M00.9 INFECTION OF RIGHT KNEE: ICD-10-CM

## 2024-01-23 LAB
BASOPHILS # BLD AUTO: 0 10*3/MM3 (ref 0–0.2)
BASOPHILS NFR BLD AUTO: 0.4 % (ref 0–1.5)
CREAT SERPL-MCNC: 0.99 MG/DL (ref 0.76–1.27)
DEPRECATED RDW RBC AUTO: 41.6 FL (ref 37–54)
EGFRCR SERPLBLD CKD-EPI 2021: 87.8 ML/MIN/1.73
EOSINOPHIL # BLD AUTO: 0.1 10*3/MM3 (ref 0–0.4)
EOSINOPHIL NFR BLD AUTO: 1.1 % (ref 0.3–6.2)
ERYTHROCYTE [DISTWIDTH] IN BLOOD BY AUTOMATED COUNT: 12.8 % (ref 12.3–15.4)
ERYTHROCYTE [SEDIMENTATION RATE] IN BLOOD: 59 MM/HR (ref 0–20)
HCT VFR BLD AUTO: 40.3 % (ref 37.5–51)
HGB BLD-MCNC: 14.1 G/DL (ref 13–17.7)
LYMPHOCYTES # BLD AUTO: 2.1 10*3/MM3 (ref 0.7–3.1)
LYMPHOCYTES NFR BLD AUTO: 25.9 % (ref 19.6–45.3)
MCH RBC QN AUTO: 30.4 PG (ref 26.6–33)
MCHC RBC AUTO-ENTMCNC: 34.9 G/DL (ref 31.5–35.7)
MCV RBC AUTO: 87.1 FL (ref 79–97)
MONOCYTES # BLD AUTO: 0.9 10*3/MM3 (ref 0.1–0.9)
MONOCYTES NFR BLD AUTO: 10.7 % (ref 5–12)
NEUTROPHILS NFR BLD AUTO: 5.1 10*3/MM3 (ref 1.7–7)
NEUTROPHILS NFR BLD AUTO: 61.9 % (ref 42.7–76)
NRBC BLD AUTO-RTO: 0.1 /100 WBC (ref 0–0.2)
PLATELET # BLD AUTO: 319 10*3/MM3 (ref 140–450)
PMV BLD AUTO: 7.7 FL (ref 6–12)
RBC # BLD AUTO: 4.63 10*6/MM3 (ref 4.14–5.8)
WBC NRBC COR # BLD AUTO: 8.3 10*3/MM3 (ref 3.4–10.8)

## 2024-01-23 PROCEDURE — 85652 RBC SED RATE AUTOMATED: CPT | Performed by: NURSE PRACTITIONER

## 2024-01-23 PROCEDURE — 85025 COMPLETE CBC W/AUTO DIFF WBC: CPT | Performed by: NURSE PRACTITIONER

## 2024-01-23 PROCEDURE — G0463 HOSPITAL OUTPT CLINIC VISIT: HCPCS

## 2024-01-23 PROCEDURE — 82565 ASSAY OF CREATININE: CPT | Performed by: NURSE PRACTITIONER

## 2024-01-23 PROCEDURE — 36592 COLLECT BLOOD FROM PICC: CPT

## 2024-01-23 NOTE — PROGRESS NOTES
PICC dressing changed per protocol. Patient labs drawn from PICC line. Patient trent well with no complaints.

## 2024-01-26 ENCOUNTER — LAB (OUTPATIENT)
Dept: LAB | Facility: HOSPITAL | Age: 59
End: 2024-01-26
Payer: COMMERCIAL

## 2024-01-26 ENCOUNTER — HOSPITAL ENCOUNTER (OUTPATIENT)
Dept: INTERVENTIONAL RADIOLOGY/VASCULAR | Facility: HOSPITAL | Age: 59
Discharge: HOME | End: 2024-01-26
Payer: COMMERCIAL

## 2024-01-26 ENCOUNTER — TRANSCRIBE ORDERS (OUTPATIENT)
Dept: ADMINISTRATIVE | Facility: HOSPITAL | Age: 59
End: 2024-01-26
Payer: COMMERCIAL

## 2024-01-26 DIAGNOSIS — M25.461 KNEE EFFUSION, RIGHT: ICD-10-CM

## 2024-01-26 DIAGNOSIS — M25.561 RIGHT KNEE PAIN, UNSPECIFIED CHRONICITY: ICD-10-CM

## 2024-01-26 DIAGNOSIS — M25.461 KNEE EFFUSION, RIGHT: Primary | ICD-10-CM

## 2024-01-26 LAB
BASOPHILS # BLD AUTO: 0.1 10*3/MM3 (ref 0–0.2)
BASOPHILS NFR BLD AUTO: 0.6 % (ref 0–1.5)
CRP SERPL-MCNC: 2.22 MG/DL (ref 0–0.5)
CRYSTALS FLD MICRO: NORMAL
DEPRECATED RDW RBC AUTO: 42 FL (ref 37–54)
EOSINOPHIL # BLD AUTO: 0 10*3/MM3 (ref 0–0.4)
EOSINOPHIL NFR BLD AUTO: 0.4 % (ref 0.3–6.2)
ERYTHROCYTE [DISTWIDTH] IN BLOOD BY AUTOMATED COUNT: 13.3 % (ref 12.3–15.4)
ERYTHROCYTE [SEDIMENTATION RATE] IN BLOOD: 25 MM/HR (ref 0–20)
HCT VFR BLD AUTO: 42.4 % (ref 37.5–51)
HGB BLD-MCNC: 14.3 G/DL (ref 13–17.7)
LYMPHOCYTES # BLD AUTO: 3.7 10*3/MM3 (ref 0.7–3.1)
LYMPHOCYTES NFR BLD AUTO: 35.3 % (ref 19.6–45.3)
MCH RBC QN AUTO: 30.1 PG (ref 26.6–33)
MCHC RBC AUTO-ENTMCNC: 33.8 G/DL (ref 31.5–35.7)
MCV RBC AUTO: 89.1 FL (ref 79–97)
MONOCYTES # BLD AUTO: 1 10*3/MM3 (ref 0.1–0.9)
MONOCYTES NFR BLD AUTO: 9.8 % (ref 5–12)
NEUTROPHILS NFR BLD AUTO: 5.6 10*3/MM3 (ref 1.7–7)
NEUTROPHILS NFR BLD AUTO: 53.9 % (ref 42.7–76)
NRBC BLD AUTO-RTO: 0 /100 WBC (ref 0–0.2)
PLATELET # BLD AUTO: 392 10*3/MM3 (ref 140–450)
PMV BLD AUTO: 7.4 FL (ref 6–12)
RBC # BLD AUTO: 4.75 10*6/MM3 (ref 4.14–5.8)
WBC NRBC COR # BLD AUTO: 10.4 10*3/MM3 (ref 3.4–10.8)

## 2024-01-26 PROCEDURE — 87205 SMEAR GRAM STAIN: CPT | Performed by: ORTHOPAEDIC SURGERY

## 2024-01-26 PROCEDURE — 36415 COLL VENOUS BLD VENIPUNCTURE: CPT

## 2024-01-26 PROCEDURE — 87075 CULTR BACTERIA EXCEPT BLOOD: CPT | Performed by: ORTHOPAEDIC SURGERY

## 2024-01-26 PROCEDURE — 85025 COMPLETE CBC W/AUTO DIFF WBC: CPT

## 2024-01-26 PROCEDURE — 25010000002 LIDOCAINE 1 % SOLUTION: Performed by: RADIOLOGY

## 2024-01-26 PROCEDURE — 89060 EXAM SYNOVIAL FLUID CRYSTALS: CPT | Performed by: ORTHOPAEDIC SURGERY

## 2024-01-26 PROCEDURE — 77002 NEEDLE LOCALIZATION BY XRAY: CPT

## 2024-01-26 PROCEDURE — 87070 CULTURE OTHR SPECIMN AEROBIC: CPT | Performed by: ORTHOPAEDIC SURGERY

## 2024-01-26 PROCEDURE — 86140 C-REACTIVE PROTEIN: CPT

## 2024-01-26 PROCEDURE — 85652 RBC SED RATE AUTOMATED: CPT

## 2024-01-26 RX ORDER — LIDOCAINE HYDROCHLORIDE 10 MG/ML
INJECTION, SOLUTION INFILTRATION; PERINEURAL AS NEEDED
Status: COMPLETED | OUTPATIENT
Start: 2024-01-26 | End: 2024-01-26

## 2024-01-26 RX ADMIN — LIDOCAINE HYDROCHLORIDE 5 ML: 10 INJECTION, SOLUTION INFILTRATION; PERINEURAL at 12:30

## 2024-01-30 ENCOUNTER — HOSPITAL ENCOUNTER (OUTPATIENT)
Dept: INFUSION THERAPY | Facility: HOSPITAL | Age: 59
Discharge: HOME | End: 2024-01-30
Admitting: NURSE PRACTITIONER
Payer: COMMERCIAL

## 2024-01-30 VITALS
RESPIRATION RATE: 18 BRPM | SYSTOLIC BLOOD PRESSURE: 149 MMHG | OXYGEN SATURATION: 97 % | TEMPERATURE: 98.1 F | HEART RATE: 76 BPM | DIASTOLIC BLOOD PRESSURE: 79 MMHG

## 2024-01-30 DIAGNOSIS — M00.9 INFECTION OF RIGHT KNEE: ICD-10-CM

## 2024-01-30 LAB
BASOPHILS # BLD AUTO: 0.1 10*3/MM3 (ref 0–0.2)
BASOPHILS NFR BLD AUTO: 1.1 % (ref 0–1.5)
CREAT SERPL-MCNC: 0.93 MG/DL (ref 0.76–1.27)
DEPRECATED RDW RBC AUTO: 41.6 FL (ref 37–54)
EGFRCR SERPLBLD CKD-EPI 2021: 94.6 ML/MIN/1.73
EOSINOPHIL # BLD AUTO: 0 10*3/MM3 (ref 0–0.4)
EOSINOPHIL NFR BLD AUTO: 0.2 % (ref 0.3–6.2)
ERYTHROCYTE [DISTWIDTH] IN BLOOD BY AUTOMATED COUNT: 13.3 % (ref 12.3–15.4)
ERYTHROCYTE [SEDIMENTATION RATE] IN BLOOD: 38 MM/HR (ref 0–20)
HCT VFR BLD AUTO: 43.3 % (ref 37.5–51)
HGB BLD-MCNC: 14.4 G/DL (ref 13–17.7)
LYMPHOCYTES # BLD AUTO: 2.8 10*3/MM3 (ref 0.7–3.1)
LYMPHOCYTES NFR BLD AUTO: 24 % (ref 19.6–45.3)
MCH RBC QN AUTO: 29.7 PG (ref 26.6–33)
MCHC RBC AUTO-ENTMCNC: 33.2 G/DL (ref 31.5–35.7)
MCV RBC AUTO: 89.3 FL (ref 79–97)
MONOCYTES # BLD AUTO: 0.6 10*3/MM3 (ref 0.1–0.9)
MONOCYTES NFR BLD AUTO: 5.3 % (ref 5–12)
NEUTROPHILS NFR BLD AUTO: 69.4 % (ref 42.7–76)
NEUTROPHILS NFR BLD AUTO: 8.2 10*3/MM3 (ref 1.7–7)
NRBC BLD AUTO-RTO: 0 /100 WBC (ref 0–0.2)
PLATELET # BLD AUTO: 387 10*3/MM3 (ref 140–450)
PMV BLD AUTO: 7.2 FL (ref 6–12)
RBC # BLD AUTO: 4.85 10*6/MM3 (ref 4.14–5.8)
WBC NRBC COR # BLD AUTO: 11.8 10*3/MM3 (ref 3.4–10.8)

## 2024-01-30 PROCEDURE — 85025 COMPLETE CBC W/AUTO DIFF WBC: CPT | Performed by: NURSE PRACTITIONER

## 2024-01-30 PROCEDURE — 36592 COLLECT BLOOD FROM PICC: CPT

## 2024-01-30 PROCEDURE — 85652 RBC SED RATE AUTOMATED: CPT | Performed by: NURSE PRACTITIONER

## 2024-01-30 PROCEDURE — 82565 ASSAY OF CREATININE: CPT | Performed by: NURSE PRACTITIONER

## 2024-01-30 NOTE — CODE DOCUMENTATION
Pt arrived to [facility] for Labs and line care.  Labs drawn via PICC line without difficulty.  Pt tolerated well.

## 2024-01-31 LAB
BACTERIA FLD CULT: NORMAL
BACTERIA SPEC ANAEROBE CULT: NORMAL
GRAM STN SPEC: NORMAL
GRAM STN SPEC: NORMAL

## 2024-02-01 ENCOUNTER — OFFICE VISIT (OUTPATIENT)
Dept: FAMILY MEDICINE CLINIC | Facility: CLINIC | Age: 59
End: 2024-02-01
Payer: COMMERCIAL

## 2024-02-01 VITALS
HEIGHT: 68 IN | WEIGHT: 220 LBS | DIASTOLIC BLOOD PRESSURE: 94 MMHG | BODY MASS INDEX: 33.34 KG/M2 | RESPIRATION RATE: 20 BRPM | SYSTOLIC BLOOD PRESSURE: 130 MMHG | HEART RATE: 84 BPM | OXYGEN SATURATION: 96 %

## 2024-02-01 DIAGNOSIS — I10 ESSENTIAL HYPERTENSION: Primary | ICD-10-CM

## 2024-02-01 PROCEDURE — 99213 OFFICE O/P EST LOW 20 MIN: CPT | Performed by: FAMILY MEDICINE

## 2024-02-01 RX ORDER — HYDROCODONE BITARTRATE AND ACETAMINOPHEN 7.5; 325 MG/1; MG/1
TABLET ORAL
COMMUNITY
Start: 2024-01-23

## 2024-02-01 RX ORDER — AMLODIPINE BESYLATE 10 MG/1
10 TABLET ORAL DAILY
Qty: 90 TABLET | Refills: 3 | Status: SHIPPED | OUTPATIENT
Start: 2024-02-01

## 2024-02-01 NOTE — PROGRESS NOTES
Subjective   Damien Avalos is a 59 y.o. male.   Chief Complaint   Patient presents with    Hypertension       History of Present Illness  Here for follow up HTN  Started on norvsc 5 at last appt. Currently still taking benazepril 40 and metoprolol 25  Bps better but not at goal          The following portions of the patient's history were reviewed and updated as appropriate: allergies, current medications, past family history, past medical history, past social history, past surgical history, and problem list.    Patient Active Problem List   Diagnosis    Arthralgia    Dyslipidemia    Essential hypertension    Cellulitis    Infection of right knee    Abnormal cardiovascular stress test       Current Outpatient Medications on File Prior to Visit   Medication Sig Dispense Refill    aspirin 81 MG EC tablet Take 2 tablets by mouth Daily.      benazepril (LOTENSIN) 40 MG tablet Take 1 tablet by mouth Daily. 90 tablet 3    celecoxib (CeleBREX) 200 MG capsule Take 1 capsule by mouth Daily.      HYDROcodone-acetaminophen (NORCO) 7.5-325 MG per tablet TAKE 1 TABLET BY ORAL ROUTE EVERY 8 HOURS AS NEEDED FOR PAIN - ALTERNATE THIS WITH TRAMADOL      metoprolol succinate XL (TOPROL-XL) 25 MG 24 hr tablet Take 1 tablet by mouth Daily. 30 tablet 0    Multiple Vitamins-Minerals (MULTIVITAMIN ADULT) tablet Take 1 tablet by mouth Daily.      traMADol (ULTRAM) 50 MG tablet Take 1 tablet by mouth Every 6 (Six) Hours As Needed for Moderate Pain.      [DISCONTINUED] acetaminophen (TYLENOL) 325 MG tablet Take 2 tablets by mouth Every 6 (Six) Hours As Needed for Mild Pain.       No current facility-administered medications on file prior to visit.     Current outpatient and discharge medications have been reconciled for the patient.  Reviewed by: Brian Jones MD      No Known Allergies    Review of Systems   Constitutional:  Negative for activity change, appetite change, fatigue and fever.   HENT:  Negative for ear pain, swollen  "glands and voice change.    Eyes:  Negative for visual disturbance.   Respiratory:  Negative for shortness of breath and wheezing.    Cardiovascular:  Negative for chest pain and leg swelling.   Gastrointestinal:  Negative for abdominal pain, blood in stool, constipation, diarrhea, nausea and vomiting.   Endocrine: Negative for polydipsia and polyuria.   Genitourinary:  Negative for dysuria, frequency and hematuria.   Musculoskeletal:  Negative for joint swelling, neck pain and neck stiffness.   Skin:  Negative for rash and wound.   Neurological:  Negative for weakness, numbness and headache.   Psychiatric/Behavioral:  Negative for suicidal ideas and depressed mood.      I have reviewed and confirmed the accuracy of the ROS as documented by the MA/LPN/RN Brian Jones MD    Objective   Visit Vitals  /94 (BP Location: Right arm, Patient Position: Sitting, Cuff Size: Large Adult)   Pulse 84   Resp 20   Ht 172.7 cm (67.99\")   Wt 99.8 kg (220 lb)   SpO2 96%   BMI 33.46 kg/m²      **  Physical Exam  Constitutional:       Appearance: He is well-developed.   HENT:      Head: Normocephalic and atraumatic.      Right Ear: External ear normal.      Left Ear: External ear normal.      Nose: Nose normal.   Eyes:      Pupils: Pupils are equal, round, and reactive to light.   Cardiovascular:      Rate and Rhythm: Normal rate and regular rhythm.      Heart sounds: Normal heart sounds.   Pulmonary:      Effort: Pulmonary effort is normal.      Breath sounds: Normal breath sounds.   Abdominal:      General: Bowel sounds are normal.      Palpations: Abdomen is soft.   Musculoskeletal:      Cervical back: Normal range of motion and neck supple.      Comments: Using crutch   Skin:     General: Skin is warm and dry.   Neurological:      Mental Status: He is alert and oriented to person, place, and time.   Psychiatric:         Behavior: Behavior normal.         Thought Content: Thought content normal.         Judgment: " Judgment normal.       Derm Physical Exam  Ortho Exam   Neurologic Exam     Mental Status   Oriented to person, place, and time.     Cranial Nerves     CN III, IV, VI   Pupils are equal, round, and reactive to light.       Diagnoses and all orders for this visit:    1. Essential hypertension (Primary)  Overview:  Not quite at goal    Assessment & Plan:  Increase norvasc, continue current meds and follow up in 3 months     Orders:  -     amLODIPine (NORVASC) 10 MG tablet; Take 1 tablet by mouth Daily.  Dispense: 90 tablet; Refill: 3     Findings discussed. All questions answered.  Medication and medication adverse effects discussed.  Drug education given and explained to patient. Patient verbalized understanding.  Follow up in 3 months for recheck, sooner for concerns. Follow-up for routine health maintenance as indicated.           Expected course, medications, and adverse effects discussed as appropriate.  Call or return if worsening or persistent symptoms.     This document is intended for medical professional use only.   Answers submitted by the patient for this visit:  Primary Reason for Visit (Submitted on 1/29/2024)  What is the primary reason for your visit?: Other  Other (Submitted on 1/29/2024)  Please describe your symptoms.: Follow up on blood pressure  Have you had these symptoms before?: Yes  How long have you been having these symptoms?: Greater than 2 weeks

## 2024-02-05 ENCOUNTER — OFFICE VISIT (OUTPATIENT)
Dept: CARDIOLOGY | Facility: CLINIC | Age: 59
End: 2024-02-05
Payer: COMMERCIAL

## 2024-02-05 VITALS
SYSTOLIC BLOOD PRESSURE: 131 MMHG | BODY MASS INDEX: 34.25 KG/M2 | HEART RATE: 74 BPM | DIASTOLIC BLOOD PRESSURE: 80 MMHG | WEIGHT: 226 LBS | OXYGEN SATURATION: 99 % | HEIGHT: 68 IN

## 2024-02-05 DIAGNOSIS — I10 HYPERTENSION, BENIGN: Primary | ICD-10-CM

## 2024-02-05 DIAGNOSIS — R94.39 ABNORMAL NUCLEAR STRESS TEST: ICD-10-CM

## 2024-02-05 PROCEDURE — 99213 OFFICE O/P EST LOW 20 MIN: CPT | Performed by: INTERNAL MEDICINE

## 2024-02-05 NOTE — PROGRESS NOTES
"    Subjective:     Encounter Date:02/05/2024      Patient ID: Damien Avalos is a 59 y.o. male.    Chief Complaint:  History of Present Illness 59-year-old white male with history of hypertension presents to my office for a follow-up.  Patient had a stress Myoview study which showed mild to moderate abnormality but was placed on medical therapy including beta-blockers and sent home from the hospital.  Patient does not have any symptoms of chest pain or shortness of breath at rest or exertion radiograms any PND orthopnea.  No palpitation dizziness syncope he has some mild swelling of the feet but he is taking his medicines and report he does not smoke.    The following portions of the patient's history were reviewed and updated as appropriate: allergies, current medications, past family history, past medical history, past social history, past surgical history, and problem list.  Past Medical History:   Diagnosis Date    Hypertension     Infection of right knee 2024     Past Surgical History:   Procedure Laterality Date    COLONOSCOPY      KNEE ARTHROSCOPY Right 01/02/2024    Procedure: KNEE ARTHROSCOPY;  Surgeon: Casey Kwan MD;  Location: [facility] OR;  Service: Orthopedics;  Laterality: Right;    KNEE INCISION AND DRAINAGE Right 01/02/2024    Procedure: KNEE INCISION AND DRAINAGE;  Surgeon: Casey Kwan MD;  Location: [facility] OR;  Service: Orthopedics;  Laterality: Right;    SHOULDER SURGERY      right shoulder-slap tear     /80   Pulse 74   Ht 172.7 cm (68\")   Wt 103 kg (226 lb)   SpO2 99%   BMI 34.36 kg/m²   Family History   Problem Relation Age of Onset    Heart disease Mother        Current Outpatient Medications:     amLODIPine (NORVASC) 10 MG tablet, Take 1 tablet by mouth Daily., Disp: 90 tablet, Rfl: 3    aspirin 81 MG EC tablet, Take 2 tablets by mouth Daily., Disp: , Rfl:     benazepril (LOTENSIN) 40 MG tablet, Take 1 tablet by mouth Daily., Disp: 90 tablet, Rfl: 3    " celecoxib (CeleBREX) 200 MG capsule, Take 1 capsule by mouth Daily., Disp: , Rfl:     HYDROcodone-acetaminophen (NORCO) 7.5-325 MG per tablet, TAKE 1 TABLET BY ORAL ROUTE EVERY 8 HOURS AS NEEDED FOR PAIN - ALTERNATE THIS WITH TRAMADOL, Disp: , Rfl:     metoprolol succinate XL (TOPROL-XL) 25 MG 24 hr tablet, Take 1 tablet by mouth Daily., Disp: 30 tablet, Rfl: 0    Multiple Vitamins-Minerals (MULTIVITAMIN ADULT) tablet, Take 1 tablet by mouth Daily., Disp: , Rfl:     traMADol (ULTRAM) 50 MG tablet, Take 1 tablet by mouth Every 6 (Six) Hours As Needed for Moderate Pain., Disp: , Rfl:   No Known Allergies  Social History     Socioeconomic History    Marital status:    Tobacco Use    Smoking status: Never     Passive exposure: Never    Smokeless tobacco: Never   Vaping Use    Vaping Use: Never used   Substance and Sexual Activity    Alcohol use: Never    Drug use: Never    Sexual activity: Yes     Partners: Female     Birth control/protection: None     Review of Systems   Constitutional: Positive for malaise/fatigue.   Cardiovascular:  Positive for leg swelling. Negative for chest pain, dyspnea on exertion and palpitations.   Respiratory:  Negative for cough and shortness of breath.    Gastrointestinal:  Negative for abdominal pain, nausea and vomiting.   Neurological:  Negative for dizziness, focal weakness, headaches, light-headedness and numbness.   All other systems reviewed and are negative.             Objective:     Constitutional:       Appearance: Well-developed.   Eyes:      General: No scleral icterus.     Conjunctiva/sclera: Conjunctivae normal.   HENT:      Head: Normocephalic and atraumatic.   Neck:      Vascular: No carotid bruit or JVD.   Pulmonary:      Effort: Pulmonary effort is normal.      Breath sounds: Normal breath sounds. No wheezing. No rales.   Cardiovascular:      Normal rate. Regular rhythm.   Pulses:     Intact distal pulses.   Abdominal:      General: Bowel sounds are normal.       Palpations: Abdomen is soft.   Musculoskeletal:      Cervical back: Normal range of motion and neck supple. Skin:     General: Skin is warm and dry.      Findings: No rash.   Neurological:      Mental Status: Alert.       Procedures    Lab Review:         MDM    #1 hypertension  Patient blood pressure is much better now with metoprolol and amlodipine and benazepril  2.  Chest pain with abnormality  Patient has mild abnormality on the stress test and hence was placed on medical therapy and is not having any symptoms and hence we will continue medical therapy.  Patient also had an echocardiogram which showed normal LV systolic function but    Patient's previous medical records, labs, and EKG were reviewed and discussed with the patient at today's visit.

## 2024-02-06 ENCOUNTER — HOSPITAL ENCOUNTER (OUTPATIENT)
Dept: INFUSION THERAPY | Facility: HOSPITAL | Age: 59
Discharge: HOME | End: 2024-02-06
Admitting: NURSE PRACTITIONER
Payer: COMMERCIAL

## 2024-02-06 DIAGNOSIS — M00.9 INFECTION OF RIGHT KNEE: Primary | ICD-10-CM

## 2024-02-06 LAB
BASOPHILS # BLD AUTO: 0 10*3/MM3 (ref 0–0.2)
BASOPHILS NFR BLD AUTO: 0.5 % (ref 0–1.5)
CREAT SERPL-MCNC: 0.79 MG/DL (ref 0.76–1.27)
DEPRECATED RDW RBC AUTO: 40.3 FL (ref 37–54)
EGFRCR SERPLBLD CKD-EPI 2021: 102.3 ML/MIN/1.73
EOSINOPHIL # BLD AUTO: 0 10*3/MM3 (ref 0–0.4)
EOSINOPHIL NFR BLD AUTO: 0.3 % (ref 0.3–6.2)
ERYTHROCYTE [DISTWIDTH] IN BLOOD BY AUTOMATED COUNT: 13 % (ref 12.3–15.4)
ERYTHROCYTE [SEDIMENTATION RATE] IN BLOOD: 59 MM/HR (ref 0–20)
HCT VFR BLD AUTO: 38.7 % (ref 37.5–51)
HGB BLD-MCNC: 13.2 G/DL (ref 13–17.7)
LYMPHOCYTES # BLD AUTO: 2 10*3/MM3 (ref 0.7–3.1)
LYMPHOCYTES NFR BLD AUTO: 20.5 % (ref 19.6–45.3)
MCH RBC QN AUTO: 30.3 PG (ref 26.6–33)
MCHC RBC AUTO-ENTMCNC: 34.1 G/DL (ref 31.5–35.7)
MCV RBC AUTO: 88.9 FL (ref 79–97)
MONOCYTES # BLD AUTO: 0.7 10*3/MM3 (ref 0.1–0.9)
MONOCYTES NFR BLD AUTO: 7.6 % (ref 5–12)
NEUTROPHILS NFR BLD AUTO: 6.9 10*3/MM3 (ref 1.7–7)
NEUTROPHILS NFR BLD AUTO: 71.1 % (ref 42.7–76)
NRBC BLD AUTO-RTO: 0 /100 WBC (ref 0–0.2)
PLATELET # BLD AUTO: 298 10*3/MM3 (ref 140–450)
PMV BLD AUTO: 7.9 FL (ref 6–12)
RBC # BLD AUTO: 4.36 10*6/MM3 (ref 4.14–5.8)
WBC NRBC COR # BLD AUTO: 9.8 10*3/MM3 (ref 3.4–10.8)

## 2024-02-06 PROCEDURE — 82565 ASSAY OF CREATININE: CPT | Performed by: NURSE PRACTITIONER

## 2024-02-06 PROCEDURE — 85025 COMPLETE CBC W/AUTO DIFF WBC: CPT | Performed by: NURSE PRACTITIONER

## 2024-02-06 PROCEDURE — G0463 HOSPITAL OUTPT CLINIC VISIT: HCPCS

## 2024-02-06 PROCEDURE — 36592 COLLECT BLOOD FROM PICC: CPT

## 2024-02-06 PROCEDURE — 85652 RBC SED RATE AUTOMATED: CPT | Performed by: NURSE PRACTITIONER

## 2024-02-06 NOTE — PROGRESS NOTES
PICC dressing change per protocol. Patient trent well. Labs drawn from PICC line without difficulty.

## 2024-02-08 ENCOUNTER — HOSPITAL ENCOUNTER (OUTPATIENT)
Dept: CARDIOLOGY | Facility: HOSPITAL | Age: 59
Discharge: HOME | End: 2024-02-08
Payer: COMMERCIAL

## 2024-02-08 ENCOUNTER — LAB (OUTPATIENT)
Dept: LAB | Facility: HOSPITAL | Age: 59
End: 2024-02-08
Payer: COMMERCIAL

## 2024-02-08 ENCOUNTER — TRANSCRIBE ORDERS (OUTPATIENT)
Dept: ADMINISTRATIVE | Facility: HOSPITAL | Age: 59
End: 2024-02-08
Payer: COMMERCIAL

## 2024-02-08 ENCOUNTER — TRANSCRIBE ORDERS (OUTPATIENT)
Dept: LAB | Facility: HOSPITAL | Age: 59
End: 2024-02-08
Payer: COMMERCIAL

## 2024-02-08 DIAGNOSIS — M25.472 PAIN AND SWELLING OF ANKLE, LEFT: Primary | ICD-10-CM

## 2024-02-08 DIAGNOSIS — M25.572 PAIN AND SWELLING OF ANKLE, LEFT: Primary | ICD-10-CM

## 2024-02-08 DIAGNOSIS — M79.89 PAIN AND SWELLING OF RIGHT LOWER LEG: ICD-10-CM

## 2024-02-08 DIAGNOSIS — M79.661 PAIN AND SWELLING OF RIGHT LOWER LEG: ICD-10-CM

## 2024-02-08 DIAGNOSIS — M79.604 LOWER EXTREMITY PAIN, ANTERIOR, RIGHT: Primary | ICD-10-CM

## 2024-02-08 DIAGNOSIS — M79.604 LOWER EXTREMITY PAIN, ANTERIOR, RIGHT: ICD-10-CM

## 2024-02-08 LAB
BASOPHILS # BLD AUTO: 0.04 10*3/MM3 (ref 0–0.2)
BASOPHILS NFR BLD AUTO: 0.4 % (ref 0–1.5)
BH CV LOWER VASCULAR LEFT COMMON FEMORAL AUGMENT: NORMAL
BH CV LOWER VASCULAR LEFT COMMON FEMORAL COMPETENT: NORMAL
BH CV LOWER VASCULAR LEFT COMMON FEMORAL COMPRESS: NORMAL
BH CV LOWER VASCULAR LEFT COMMON FEMORAL PHASIC: NORMAL
BH CV LOWER VASCULAR LEFT COMMON FEMORAL SPONT: NORMAL
BH CV LOWER VASCULAR RIGHT COMMON FEMORAL AUGMENT: NORMAL
BH CV LOWER VASCULAR RIGHT COMMON FEMORAL COMPETENT: NORMAL
BH CV LOWER VASCULAR RIGHT COMMON FEMORAL COMPRESS: NORMAL
BH CV LOWER VASCULAR RIGHT COMMON FEMORAL PHASIC: NORMAL
BH CV LOWER VASCULAR RIGHT COMMON FEMORAL SPONT: NORMAL
BH CV LOWER VASCULAR RIGHT DISTAL FEMORAL COMPRESS: NORMAL
BH CV LOWER VASCULAR RIGHT GASTRONEMIUS COMPRESS: NORMAL
BH CV LOWER VASCULAR RIGHT GREATER SAPH AK COMPRESS: NORMAL
BH CV LOWER VASCULAR RIGHT GREATER SAPH BK COMPRESS: NORMAL
BH CV LOWER VASCULAR RIGHT LESSER SAPH COMPRESS: NORMAL
BH CV LOWER VASCULAR RIGHT MID FEMORAL AUGMENT: NORMAL
BH CV LOWER VASCULAR RIGHT MID FEMORAL COMPETENT: NORMAL
BH CV LOWER VASCULAR RIGHT MID FEMORAL COMPRESS: NORMAL
BH CV LOWER VASCULAR RIGHT MID FEMORAL PHASIC: NORMAL
BH CV LOWER VASCULAR RIGHT MID FEMORAL SPONT: NORMAL
BH CV LOWER VASCULAR RIGHT PERONEAL COMPRESS: NORMAL
BH CV LOWER VASCULAR RIGHT POPLITEAL AUGMENT: NORMAL
BH CV LOWER VASCULAR RIGHT POPLITEAL COMPETENT: NORMAL
BH CV LOWER VASCULAR RIGHT POPLITEAL COMPRESS: NORMAL
BH CV LOWER VASCULAR RIGHT POPLITEAL PHASIC: NORMAL
BH CV LOWER VASCULAR RIGHT POPLITEAL SPONT: NORMAL
BH CV LOWER VASCULAR RIGHT POSTERIOR TIBIAL COMPRESS: NORMAL
BH CV LOWER VASCULAR RIGHT PROXIMAL FEMORAL COMPRESS: NORMAL
BH CV LOWER VASCULAR RIGHT SAPHENOFEMORAL JUNCTION COMPRESS: NORMAL
BH CV VAS PRELIMINARY FINDINGS SCRIPTING: 1
CRP SERPL-MCNC: 7.91 MG/DL (ref 0–0.5)
DEPRECATED RDW RBC AUTO: 39.3 FL (ref 37–54)
EOSINOPHIL # BLD AUTO: 0.03 10*3/MM3 (ref 0–0.4)
EOSINOPHIL NFR BLD AUTO: 0.3 % (ref 0.3–6.2)
ERYTHROCYTE [DISTWIDTH] IN BLOOD BY AUTOMATED COUNT: 12.5 % (ref 12.3–15.4)
ERYTHROCYTE [SEDIMENTATION RATE] IN BLOOD: 37 MM/HR (ref 0–20)
HCT VFR BLD AUTO: 37.8 % (ref 37.5–51)
HGB BLD-MCNC: 12.9 G/DL (ref 13–17.7)
IMM GRANULOCYTES # BLD AUTO: 0.03 10*3/MM3 (ref 0–0.05)
IMM GRANULOCYTES NFR BLD AUTO: 0.3 % (ref 0–0.5)
LYMPHOCYTES # BLD AUTO: 2.3 10*3/MM3 (ref 0.7–3.1)
LYMPHOCYTES NFR BLD AUTO: 24.2 % (ref 19.6–45.3)
MCH RBC QN AUTO: 29.6 PG (ref 26.6–33)
MCHC RBC AUTO-ENTMCNC: 34.1 G/DL (ref 31.5–35.7)
MCV RBC AUTO: 86.7 FL (ref 79–97)
MONOCYTES # BLD AUTO: 0.78 10*3/MM3 (ref 0.1–0.9)
MONOCYTES NFR BLD AUTO: 8.2 % (ref 5–12)
NEUTROPHILS NFR BLD AUTO: 6.31 10*3/MM3 (ref 1.7–7)
NEUTROPHILS NFR BLD AUTO: 66.6 % (ref 42.7–76)
NRBC BLD AUTO-RTO: 0 /100 WBC (ref 0–0.2)
PLATELET # BLD AUTO: 329 10*3/MM3 (ref 140–450)
PMV BLD AUTO: 9.8 FL (ref 6–12)
RBC # BLD AUTO: 4.36 10*6/MM3 (ref 4.14–5.8)
WBC NRBC COR # BLD AUTO: 9.49 10*3/MM3 (ref 3.4–10.8)

## 2024-02-08 PROCEDURE — 85025 COMPLETE CBC W/AUTO DIFF WBC: CPT

## 2024-02-08 PROCEDURE — 85652 RBC SED RATE AUTOMATED: CPT

## 2024-02-08 PROCEDURE — 86140 C-REACTIVE PROTEIN: CPT

## 2024-02-08 PROCEDURE — 36415 COLL VENOUS BLD VENIPUNCTURE: CPT

## 2024-02-08 PROCEDURE — 93971 EXTREMITY STUDY: CPT

## 2024-02-13 ENCOUNTER — HOSPITAL ENCOUNTER (OUTPATIENT)
Dept: INFUSION THERAPY | Facility: HOSPITAL | Age: 59
Discharge: HOME | End: 2024-02-13
Admitting: NURSE PRACTITIONER
Payer: COMMERCIAL

## 2024-02-13 DIAGNOSIS — M00.9 INFECTION OF RIGHT KNEE: ICD-10-CM

## 2024-02-13 LAB
BASOPHILS # BLD AUTO: 0 10*3/MM3 (ref 0–0.2)
BASOPHILS NFR BLD AUTO: 0.6 % (ref 0–1.5)
CREAT SERPL-MCNC: 1.19 MG/DL (ref 0.76–1.27)
DEPRECATED RDW RBC AUTO: 39.8 FL (ref 37–54)
EGFRCR SERPLBLD CKD-EPI 2021: 70.4 ML/MIN/1.73
EOSINOPHIL # BLD AUTO: 0.1 10*3/MM3 (ref 0–0.4)
EOSINOPHIL NFR BLD AUTO: 0.8 % (ref 0.3–6.2)
ERYTHROCYTE [DISTWIDTH] IN BLOOD BY AUTOMATED COUNT: 12.9 % (ref 12.3–15.4)
ERYTHROCYTE [SEDIMENTATION RATE] IN BLOOD: 59 MM/HR (ref 0–20)
HCT VFR BLD AUTO: 37.4 % (ref 37.5–51)
HGB BLD-MCNC: 12.7 G/DL (ref 13–17.7)
LYMPHOCYTES # BLD AUTO: 1.9 10*3/MM3 (ref 0.7–3.1)
LYMPHOCYTES NFR BLD AUTO: 23.3 % (ref 19.6–45.3)
MCH RBC QN AUTO: 29.8 PG (ref 26.6–33)
MCHC RBC AUTO-ENTMCNC: 34 G/DL (ref 31.5–35.7)
MCV RBC AUTO: 87.8 FL (ref 79–97)
MONOCYTES # BLD AUTO: 0.9 10*3/MM3 (ref 0.1–0.9)
MONOCYTES NFR BLD AUTO: 10.7 % (ref 5–12)
NEUTROPHILS NFR BLD AUTO: 5.2 10*3/MM3 (ref 1.7–7)
NEUTROPHILS NFR BLD AUTO: 64.6 % (ref 42.7–76)
NRBC BLD AUTO-RTO: 0 /100 WBC (ref 0–0.2)
PLATELET # BLD AUTO: 324 10*3/MM3 (ref 140–450)
PMV BLD AUTO: 7.6 FL (ref 6–12)
RBC # BLD AUTO: 4.25 10*6/MM3 (ref 4.14–5.8)
WBC NRBC COR # BLD AUTO: 8.1 10*3/MM3 (ref 3.4–10.8)

## 2024-02-13 PROCEDURE — 36592 COLLECT BLOOD FROM PICC: CPT

## 2024-02-13 PROCEDURE — 36589 REMOVAL TUNNELED CV CATH: CPT

## 2024-02-13 PROCEDURE — 82565 ASSAY OF CREATININE: CPT | Performed by: NURSE PRACTITIONER

## 2024-02-13 PROCEDURE — 85025 COMPLETE CBC W/AUTO DIFF WBC: CPT | Performed by: NURSE PRACTITIONER

## 2024-02-13 PROCEDURE — 85652 RBC SED RATE AUTOMATED: CPT | Performed by: NURSE PRACTITIONER

## 2024-03-28 ENCOUNTER — TRANSCRIBE ORDERS (OUTPATIENT)
Dept: LAB | Facility: HOSPITAL | Age: 59
End: 2024-03-28
Payer: COMMERCIAL

## 2024-03-28 ENCOUNTER — LAB (OUTPATIENT)
Dept: LAB | Facility: HOSPITAL | Age: 59
End: 2024-03-28
Payer: COMMERCIAL

## 2024-03-28 DIAGNOSIS — M25.561 RIGHT KNEE PAIN, UNSPECIFIED CHRONICITY: Primary | ICD-10-CM

## 2024-03-28 DIAGNOSIS — Z00.00 ANNUAL PHYSICAL EXAM: ICD-10-CM

## 2024-03-28 DIAGNOSIS — I10 HYPERTENSION, BENIGN: ICD-10-CM

## 2024-03-28 DIAGNOSIS — M25.561 RIGHT KNEE PAIN, UNSPECIFIED CHRONICITY: ICD-10-CM

## 2024-03-28 DIAGNOSIS — Z98.890 PERSONAL HISTORY OF SURGERY TO HEART AND GREAT VESSELS, PRESENTING HAZARDS TO HEALTH: ICD-10-CM

## 2024-03-28 LAB
BASOPHILS # BLD AUTO: 0.04 10*3/MM3 (ref 0–0.2)
BASOPHILS NFR BLD AUTO: 0.5 % (ref 0–1.5)
CRP SERPL-MCNC: 8.85 MG/DL (ref 0–0.5)
DEPRECATED RDW RBC AUTO: 38.1 FL (ref 37–54)
EOSINOPHIL # BLD AUTO: 0.02 10*3/MM3 (ref 0–0.4)
EOSINOPHIL NFR BLD AUTO: 0.2 % (ref 0.3–6.2)
ERYTHROCYTE [DISTWIDTH] IN BLOOD BY AUTOMATED COUNT: 12.5 % (ref 12.3–15.4)
ERYTHROCYTE [SEDIMENTATION RATE] IN BLOOD: 26 MM/HR (ref 0–20)
HCT VFR BLD AUTO: 41.6 % (ref 37.5–51)
HGB BLD-MCNC: 13.8 G/DL (ref 13–17.7)
IMM GRANULOCYTES # BLD AUTO: 0.02 10*3/MM3 (ref 0–0.05)
IMM GRANULOCYTES NFR BLD AUTO: 0.2 % (ref 0–0.5)
LYMPHOCYTES # BLD AUTO: 2.12 10*3/MM3 (ref 0.7–3.1)
LYMPHOCYTES NFR BLD AUTO: 26.1 % (ref 19.6–45.3)
MCH RBC QN AUTO: 28 PG (ref 26.6–33)
MCHC RBC AUTO-ENTMCNC: 33.2 G/DL (ref 31.5–35.7)
MCV RBC AUTO: 84.4 FL (ref 79–97)
MONOCYTES # BLD AUTO: 0.51 10*3/MM3 (ref 0.1–0.9)
MONOCYTES NFR BLD AUTO: 6.3 % (ref 5–12)
NEUTROPHILS NFR BLD AUTO: 5.42 10*3/MM3 (ref 1.7–7)
NEUTROPHILS NFR BLD AUTO: 66.7 % (ref 42.7–76)
NRBC BLD AUTO-RTO: 0 /100 WBC (ref 0–0.2)
PLATELET # BLD AUTO: 384 10*3/MM3 (ref 140–450)
PMV BLD AUTO: 9.7 FL (ref 6–12)
RBC # BLD AUTO: 4.93 10*6/MM3 (ref 4.14–5.8)
WBC NRBC COR # BLD AUTO: 8.13 10*3/MM3 (ref 3.4–10.8)

## 2024-03-28 PROCEDURE — 85025 COMPLETE CBC W/AUTO DIFF WBC: CPT

## 2024-03-28 PROCEDURE — 85652 RBC SED RATE AUTOMATED: CPT

## 2024-03-28 PROCEDURE — 86140 C-REACTIVE PROTEIN: CPT

## 2024-03-28 PROCEDURE — 36415 COLL VENOUS BLD VENIPUNCTURE: CPT

## 2024-03-28 RX ORDER — BENAZEPRIL HYDROCHLORIDE 40 MG/1
40 TABLET ORAL DAILY
Qty: 90 TABLET | Refills: 3 | Status: SHIPPED | OUTPATIENT
Start: 2024-03-28

## 2024-03-28 NOTE — TELEPHONE ENCOUNTER
Caller: ELYSEMADAY    Relationship: Emergency Contact    Best call back number: [phone]    Requested Prescriptions:   Requested Prescriptions     Pending Prescriptions Disp Refills    benazepril (LOTENSIN) 40 MG tablet 90 tablet 3     Sig: Take 1 tablet by mouth Daily.        Pharmacy where request should be sent: [facility]Y #[id number] - [address] AT [place] [address] - [phone] [place] [phone]      Last office visit with prescribing clinician: 2/1/2024   Last telemedicine visit with prescribing clinician: Visit date not found   Next office visit with prescribing clinician: 5/1/2024     Does the patient have less than a 3 day supply:  [] Yes  [x] No      Elenita Doan Rep   03/28/24 16:17 EDT

## 2024-05-01 ENCOUNTER — OFFICE VISIT (OUTPATIENT)
Dept: FAMILY MEDICINE CLINIC | Facility: CLINIC | Age: 59
End: 2024-05-01
Payer: COMMERCIAL

## 2024-05-01 VITALS
BODY MASS INDEX: 33.68 KG/M2 | DIASTOLIC BLOOD PRESSURE: 86 MMHG | OXYGEN SATURATION: 98 % | SYSTOLIC BLOOD PRESSURE: 132 MMHG | WEIGHT: 222.2 LBS | RESPIRATION RATE: 18 BRPM | TEMPERATURE: 97.5 F | HEART RATE: 76 BPM | HEIGHT: 68 IN

## 2024-05-01 DIAGNOSIS — I10 HYPERTENSION, BENIGN: Primary | ICD-10-CM

## 2024-05-01 PROCEDURE — 99212 OFFICE O/P EST SF 10 MIN: CPT | Performed by: FAMILY MEDICINE

## 2024-05-01 NOTE — PROGRESS NOTES
Subjective   Damien Avalos is a 59 y.o. male.   Chief Complaint   Patient presents with    Hypertension       History of Present Illness  Currently on benazepril and norvasc only   Hypertension  This is a chronic problem. The current episode started more than 1 year ago. Pertinent negatives include no blurred vision, chest pain, headaches, malaise/fatigue, neck pain, orthopnea, palpitations, shortness of breath or sweats. There are no associated agents to hypertension. Risk factors for coronary artery disease include dyslipidemia, obesity and male gender. Current antihypertension treatment includes beta blockers, ACE inhibitors and calcium channel blockers.        The following portions of the patient's history were reviewed and updated as appropriate: allergies, current medications, past family history, past medical history, past social history, past surgical history, and problem list.    Patient Active Problem List   Diagnosis    Arthralgia    Dyslipidemia    Essential hypertension    Cellulitis    Infection of right knee    Abnormal cardiovascular stress test       Current Outpatient Medications on File Prior to Visit   Medication Sig Dispense Refill    amLODIPine (NORVASC) 10 MG tablet Take 1 tablet by mouth Daily. 90 tablet 3    aspirin 81 MG EC tablet Take 2 tablets by mouth Daily.      benazepril (LOTENSIN) 40 MG tablet Take 1 tablet by mouth Daily. 90 tablet 3    Multiple Vitamins-Minerals (MULTIVITAMIN ADULT) tablet Take 1 tablet by mouth Daily.      celecoxib (CeleBREX) 200 MG capsule Take 1 capsule by mouth Daily. (Patient not taking: Reported on 5/1/2024)      [DISCONTINUED] HYDROcodone-acetaminophen (NORCO) 7.5-325 MG per tablet TAKE 1 TABLET BY ORAL ROUTE EVERY 8 HOURS AS NEEDED FOR PAIN - ALTERNATE THIS WITH TRAMADOL (Patient not taking: Reported on 5/1/2024)      [DISCONTINUED] metoprolol succinate XL (TOPROL-XL) 25 MG 24 hr tablet Take 1 tablet by mouth Daily. (Patient not taking: Reported on  "5/1/2024) 30 tablet 0    [DISCONTINUED] traMADol (ULTRAM) 50 MG tablet Take 1 tablet by mouth Every 6 (Six) Hours As Needed for Moderate Pain. (Patient not taking: Reported on 5/1/2024)       No current facility-administered medications on file prior to visit.     Current outpatient and discharge medications have been reconciled for the patient.  Reviewed by: Brian Jones MD      No Known Allergies    Review of Systems   Constitutional:  Negative for activity change, appetite change, fatigue, fever and malaise/fatigue.   HENT:  Negative for ear pain, swollen glands and voice change.    Eyes:  Negative for blurred vision and visual disturbance.   Respiratory:  Negative for shortness of breath and wheezing.    Cardiovascular:  Negative for chest pain, palpitations, orthopnea and leg swelling.   Gastrointestinal:  Negative for abdominal pain, blood in stool, constipation, diarrhea, nausea and vomiting.   Endocrine: Negative for polydipsia and polyuria.   Genitourinary:  Negative for dysuria, frequency and hematuria.   Musculoskeletal:  Negative for joint swelling, neck pain and neck stiffness.   Skin:  Negative for rash and wound.   Neurological:  Negative for weakness, numbness and headache.   Psychiatric/Behavioral:  Negative for suicidal ideas and depressed mood.      I have reviewed and confirmed the accuracy of the ROS as documented by the MA/LPN/RN Brian Jones MD    Objective   Visit Vitals  /86   Pulse 76   Temp 97.5 °F (36.4 °C) (Temporal)   Resp 18   Ht 172.7 cm (68\")   Wt 101 kg (222 lb 3.2 oz)   SpO2 98%   BMI 33.79 kg/m²      **  Physical Exam  Constitutional:       Appearance: He is well-developed.   HENT:      Head: Normocephalic and atraumatic.      Right Ear: External ear normal.      Left Ear: External ear normal.      Nose: Nose normal.   Eyes:      Pupils: Pupils are equal, round, and reactive to light.   Cardiovascular:      Rate and Rhythm: Normal rate and regular rhythm. "      Heart sounds: Normal heart sounds.   Pulmonary:      Effort: Pulmonary effort is normal.      Breath sounds: Normal breath sounds.   Abdominal:      General: Bowel sounds are normal.      Palpations: Abdomen is soft.   Musculoskeletal:         General: Normal range of motion.      Cervical back: Normal range of motion and neck supple.   Skin:     General: Skin is warm and dry.   Neurological:      Mental Status: He is alert and oriented to person, place, and time.   Psychiatric:         Behavior: Behavior normal.         Thought Content: Thought content normal.         Judgment: Judgment normal.       Derm Physical Exam  Ortho Exam   Neurologic Exam     Mental Status   Oriented to person, place, and time.     Cranial Nerves     CN III, IV, VI   Pupils are equal, round, and reactive to light.         Diagnoses and all orders for this visit:    1. Hypertension, benign (Primary)  Comments:  doing well, continue current management        Findings discussed. All questions answered.  Medication and medication adverse effects discussed.  Drug education given and explained to patient. Patient verbalized understanding.  Follow-up for routine health maintenance as indicated.  Follow up in 6 months for recheck, sooner for worsening symptoms or any concerns.        Expected course, medications, and adverse effects discussed as appropriate.  Call or return if worsening or persistent symptoms.     This document is intended for medical professional use only.   Electronically signed by Brian Jones MD on 05/01/2024. This content may not have been proofread.

## 2024-05-17 NOTE — PROGRESS NOTES
Medical Examination    Subjective   Damien Avalos is a 59 y.o. male who presents today for a  fitness determination physical exam. The patient reports no problems.  The following portions of the patient's history were reviewed and updated as appropriate: allergies, current medications, past family history, past medical history, past social history, past surgical history, and problem list.  Review of Systems  Pertinent items are noted in HPI.    Objective    Vision:  Vision Screening    Right eye Left eye Both eyes   Without correction 20/20 20/20 20/20   With correction          Applicant can recognize and distinguish among traffic control signals and devices showing standard red, green, and ana colors.  Applicant has peripheral vision to 90 degrees in each eye.         Monocular Vision?: No      Hearing:  Applicant can distinguish forced whisper at a distance of 5 feet with both ears.         Physical Exam  Constitutional:       Appearance: He is well-developed.   HENT:      Head: Normocephalic and atraumatic.      Right Ear: External ear normal.      Left Ear: External ear normal.      Nose: Nose normal.   Eyes:      Pupils: Pupils are equal, round, and reactive to light.   Cardiovascular:      Rate and Rhythm: Normal rate and regular rhythm.      Heart sounds: Normal heart sounds.   Pulmonary:      Effort: Pulmonary effort is normal.      Breath sounds: Normal breath sounds.   Abdominal:      General: Bowel sounds are normal.      Palpations: Abdomen is soft.   Musculoskeletal:         General: Normal range of motion.      Cervical back: Normal range of motion and neck supple.   Skin:     General: Skin is warm and dry.   Neurological:      Mental Status: He is alert and oriented to person, place, and time.   Psychiatric:         Behavior: Behavior normal.         Thought Content: Thought content normal.         Judgment: Judgment normal.          Labs:  Lab Results    Component Value Date    SPECGRAV 1.010 05/20/2024    BILIRUBINUR Negative 05/20/2024    GLUCOSEU 87 11/01/2018       Assessment & Plan   Healthy male exam.   Meets standards in 49 .41;  qualifies for 2 year certificate.     Medical examiners certificate completed and printed.  Return as needed.

## 2024-05-20 ENCOUNTER — OFFICE VISIT (OUTPATIENT)
Dept: FAMILY MEDICINE CLINIC | Facility: CLINIC | Age: 59
End: 2024-05-20

## 2024-05-20 VITALS
RESPIRATION RATE: 20 BRPM | DIASTOLIC BLOOD PRESSURE: 80 MMHG | BODY MASS INDEX: 33.19 KG/M2 | OXYGEN SATURATION: 98 % | SYSTOLIC BLOOD PRESSURE: 132 MMHG | WEIGHT: 219 LBS | HEART RATE: 71 BPM | HEIGHT: 68 IN

## 2024-05-20 DIAGNOSIS — Z02.4 ENCOUNTER FOR CDL (COMMERCIAL DRIVING LICENSE) EXAM: Primary | ICD-10-CM

## 2024-05-20 LAB
BILIRUB BLD-MCNC: NEGATIVE MG/DL
CLARITY, POC: CLEAR
COLOR UR: YELLOW
GLUCOSE UR STRIP-MCNC: NEGATIVE MG/DL
KETONES UR QL: NEGATIVE
LEUKOCYTE EST, POC: NEGATIVE
NITRITE UR-MCNC: NEGATIVE MG/ML
PH UR: 6 [PH] (ref 5–8)
PROT UR STRIP-MCNC: NEGATIVE MG/DL
RBC # UR STRIP: ABNORMAL /UL
SP GR UR: 1.01 (ref 1–1.03)
UROBILINOGEN UR QL: ABNORMAL

## 2024-05-20 PROCEDURE — 81002 URINALYSIS NONAUTO W/O SCOPE: CPT | Performed by: FAMILY MEDICINE

## 2024-05-20 PROCEDURE — DOTPHY: Performed by: FAMILY MEDICINE

## 2025-03-16 DIAGNOSIS — I10 ESSENTIAL HYPERTENSION: ICD-10-CM

## 2025-03-17 RX ORDER — AMLODIPINE BESYLATE 10 MG/1
10 TABLET ORAL DAILY
Qty: 90 TABLET | Refills: 3 | OUTPATIENT
Start: 2025-03-17

## 2025-06-19 ENCOUNTER — OFFICE VISIT (OUTPATIENT)
Dept: CARDIOLOGY | Facility: CLINIC | Age: 60
End: 2025-06-19

## 2025-06-19 VITALS
WEIGHT: 219 LBS | SYSTOLIC BLOOD PRESSURE: 134 MMHG | HEART RATE: 67 BPM | BODY MASS INDEX: 33.19 KG/M2 | OXYGEN SATURATION: 100 % | DIASTOLIC BLOOD PRESSURE: 80 MMHG | HEIGHT: 68 IN

## 2025-06-19 DIAGNOSIS — I10 HYPERTENSION, BENIGN: Primary | ICD-10-CM

## 2025-06-19 NOTE — PROGRESS NOTES
"    Subjective:     Encounter Date:06/19/2025      Patient ID: Damien Avalos is a 60 y.o. male.    Chief Complaint:  Hypertension  Associated symptoms: no chest pain, no headaches, no malaise/fatigue, no palpitations, no shortness of breath and no dizziness      60-year-old white male with history of hypertension presents to my office for a follow-up.  Patient is currently stable without any symptoms of chest pain or shortness of breath at rest or exertion.  No complaint of any PND orthopnea.  No palpitations dizziness syncope or swelling of the feet.  Patient is taking all the medicines regularly.  The following portions of the patient's history were reviewed and updated as appropriate: allergies, current medications, past family history, past medical history, past social history, past surgical history, and problem list.  Past Medical History:   Diagnosis Date    Hypertension     Infection of right knee 2024     Past Surgical History:   Procedure Laterality Date    COLONOSCOPY      KNEE ARTHROSCOPY Right 01/02/2024    Procedure: KNEE ARTHROSCOPY;  Surgeon: Casey Kwan MD;  Location: [facility] OR;  Service: Orthopedics;  Laterality: Right;    KNEE INCISION AND DRAINAGE Right 01/02/2024    Procedure: KNEE INCISION AND DRAINAGE;  Surgeon: Casey Kwan MD;  Location: [facility] OR;  Service: Orthopedics;  Laterality: Right;    SHOULDER SURGERY      right shoulder-slap tear     /80   Pulse 67   Ht 172.7 cm (67.99\")   Wt 99.3 kg (219 lb)   SpO2 100%   BMI 33.31 kg/m²   Family History   Problem Relation Age of Onset    Heart disease Mother        Current Outpatient Medications:     amLODIPine (NORVASC) 10 MG tablet, Take 1 tablet by mouth Daily., Disp: 90 tablet, Rfl: 3    aspirin 81 MG EC tablet, Take 2 tablets by mouth Daily., Disp: , Rfl:     benazepril (LOTENSIN) 40 MG tablet, Take 1 tablet by mouth Daily., Disp: 90 tablet, Rfl: 3    Multiple Vitamins-Minerals (MULTIVITAMIN ADULT) " tablet, Take 1 tablet by mouth Daily., Disp: , Rfl:   No Known Allergies  Social History     Socioeconomic History    Marital status:    Tobacco Use    Smoking status: Never     Passive exposure: Never    Smokeless tobacco: Never   Vaping Use    Vaping status: Never Used   Substance and Sexual Activity    Alcohol use: Never    Drug use: Never    Sexual activity: Yes     Partners: Female     Birth control/protection: None     Review of Systems   Constitutional: Negative for malaise/fatigue.   Cardiovascular:  Negative for chest pain, dyspnea on exertion, leg swelling and palpitations.   Respiratory:  Negative for cough and shortness of breath.    Gastrointestinal:  Negative for abdominal pain, nausea and vomiting.   Neurological:  Negative for dizziness, headaches, light-headedness, numbness and weakness.   All other systems reviewed and are negative.             Objective:     Constitutional:       Appearance: Well-developed.   Eyes:      General: No scleral icterus.     Conjunctiva/sclera: Conjunctivae normal.   HENT:      Head: Normocephalic and atraumatic.   Neck:      Vascular: No carotid bruit or JVD.   Pulmonary:      Effort: Pulmonary effort is normal.      Breath sounds: Normal breath sounds. No wheezing. No rales.   Cardiovascular:      Normal rate. Regular rhythm.   Pulses:     Intact distal pulses.   Abdominal:      General: Bowel sounds are normal.      Palpations: Abdomen is soft.   Musculoskeletal:      Cervical back: Normal range of motion and neck supple. Skin:     General: Skin is warm and dry.      Findings: No rash.   Neurological:      Mental Status: Alert.       Procedures    Lab Review:         MDM    #1 abnormal stress test  Patient has mild abnormality on the stress test but was placed on medical therapy and since then is not having any symptoms and hence no further cardiac workup is needed  2.  Hypertension  Patient blood pressure currently stable on amlodipine and benazepril  3.   Risk factor modification  Patient needs to have fasting lipid panel performed and be placed on statins as needed    Patient's previous medical records, labs, and EKG were reviewed and discussed with the patient at today's visit.

## 2025-08-13 ENCOUNTER — OFFICE VISIT (OUTPATIENT)
Dept: FAMILY MEDICINE CLINIC | Facility: CLINIC | Age: 60
End: 2025-08-13

## 2025-08-13 VITALS
HEART RATE: 68 BPM | TEMPERATURE: 97.8 F | WEIGHT: 218.6 LBS | RESPIRATION RATE: 18 BRPM | HEIGHT: 68 IN | OXYGEN SATURATION: 95 % | SYSTOLIC BLOOD PRESSURE: 128 MMHG | DIASTOLIC BLOOD PRESSURE: 80 MMHG | BODY MASS INDEX: 33.13 KG/M2

## 2025-08-13 DIAGNOSIS — E78.5 DYSLIPIDEMIA: ICD-10-CM

## 2025-08-13 DIAGNOSIS — I10 ESSENTIAL HYPERTENSION: ICD-10-CM

## 2025-08-13 DIAGNOSIS — Z00.00 ENCOUNTER FOR WELLNESS EXAMINATION: Primary | ICD-10-CM

## 2025-08-13 DIAGNOSIS — Z12.5 PROSTATE CANCER SCREENING: ICD-10-CM

## 2025-08-13 LAB
BILIRUB BLD-MCNC: NEGATIVE MG/DL
CLARITY, POC: CLEAR
COLOR UR: YELLOW
GLUCOSE UR STRIP-MCNC: NEGATIVE MG/DL
KETONES UR QL: NEGATIVE
LEUKOCYTE EST, POC: NEGATIVE
NITRITE UR-MCNC: NEGATIVE MG/ML
PH UR: 5 [PH] (ref 5–8)
PROT UR STRIP-MCNC: ABNORMAL MG/DL
RBC # UR STRIP: ABNORMAL /UL
SP GR UR: 1.01 (ref 1–1.03)
UROBILINOGEN UR QL: NORMAL

## 2025-08-13 RX ORDER — AMLODIPINE BESYLATE 10 MG/1
10 TABLET ORAL DAILY
Qty: 90 TABLET | Refills: 3 | Status: SHIPPED | OUTPATIENT
Start: 2025-08-13

## 2025-08-13 RX ORDER — BENAZEPRIL HYDROCHLORIDE 40 MG/1
40 TABLET ORAL DAILY
Qty: 90 TABLET | Refills: 3 | Status: SHIPPED | OUTPATIENT
Start: 2025-08-13

## 2025-08-14 ENCOUNTER — RESULTS FOLLOW-UP (OUTPATIENT)
Dept: FAMILY MEDICINE CLINIC | Facility: CLINIC | Age: 60
End: 2025-08-14

## 2025-08-14 LAB
ALBUMIN SERPL-MCNC: 4.2 G/DL (ref 3.8–4.9)
ALP SERPL-CCNC: 75 IU/L (ref 44–121)
ALT SERPL-CCNC: 15 IU/L (ref 0–44)
AST SERPL-CCNC: 16 IU/L (ref 0–40)
BASOPHILS # BLD AUTO: 0 X10E3/UL (ref 0–0.2)
BASOPHILS NFR BLD AUTO: 0 %
BILIRUB SERPL-MCNC: 0.5 MG/DL (ref 0–1.2)
BUN SERPL-MCNC: 16 MG/DL (ref 8–27)
BUN/CREAT SERPL: 16 (ref 10–24)
CALCIUM SERPL-MCNC: 9.2 MG/DL (ref 8.6–10.2)
CHLORIDE SERPL-SCNC: 105 MMOL/L (ref 96–106)
CHOLEST SERPL-MCNC: 154 MG/DL (ref 100–199)
CHOLEST/HDLC SERPL: 5.3 RATIO (ref 0–5)
CO2 SERPL-SCNC: 23 MMOL/L (ref 20–29)
CREAT SERPL-MCNC: 1.01 MG/DL (ref 0.76–1.27)
EGFRCR SERPLBLD CKD-EPI 2021: 85 ML/MIN/1.73
EOSINOPHIL # BLD AUTO: 0.1 X10E3/UL (ref 0–0.4)
EOSINOPHIL NFR BLD AUTO: 1 %
ERYTHROCYTE [DISTWIDTH] IN BLOOD BY AUTOMATED COUNT: 12.7 % (ref 11.6–15.4)
GLOBULIN SER CALC-MCNC: 3.1 G/DL (ref 1.5–4.5)
GLUCOSE SERPL-MCNC: 105 MG/DL (ref 70–99)
HCT VFR BLD AUTO: 50.1 % (ref 37.5–51)
HDLC SERPL-MCNC: 29 MG/DL
HGB BLD-MCNC: 17.1 G/DL (ref 13–17.7)
IMM GRANULOCYTES # BLD AUTO: 0 X10E3/UL (ref 0–0.1)
IMM GRANULOCYTES NFR BLD AUTO: 0 %
LDLC SERPL CALC-MCNC: 88 MG/DL (ref 0–99)
LYMPHOCYTES # BLD AUTO: 2.8 X10E3/UL (ref 0.7–3.1)
LYMPHOCYTES NFR BLD AUTO: 39 %
MCH RBC QN AUTO: 31 PG (ref 26.6–33)
MCHC RBC AUTO-ENTMCNC: 34.1 G/DL (ref 31.5–35.7)
MCV RBC AUTO: 91 FL (ref 79–97)
MONOCYTES # BLD AUTO: 0.5 X10E3/UL (ref 0.1–0.9)
MONOCYTES NFR BLD AUTO: 8 %
NEUTROPHILS # BLD AUTO: 3.6 X10E3/UL (ref 1.4–7)
NEUTROPHILS NFR BLD AUTO: 52 %
PLATELET # BLD AUTO: 255 X10E3/UL (ref 150–450)
POTASSIUM SERPL-SCNC: 4.3 MMOL/L (ref 3.5–5.2)
PROT SERPL-MCNC: 7.3 G/DL (ref 6–8.5)
PSA SERPL-MCNC: 1.1 NG/ML (ref 0–4)
RBC # BLD AUTO: 5.52 X10E6/UL (ref 4.14–5.8)
SODIUM SERPL-SCNC: 141 MMOL/L (ref 134–144)
TRIGL SERPL-MCNC: 214 MG/DL (ref 0–149)
TSH SERPL DL<=0.005 MIU/L-ACNC: 2.17 UIU/ML (ref 0.45–4.5)
VLDLC SERPL CALC-MCNC: 37 MG/DL (ref 5–40)
WBC # BLD AUTO: 7 X10E3/UL (ref 3.4–10.8)

## (undated) DEVICE — GLV SURG BIOGEL LTX PF 8

## (undated) DEVICE — NDL HYPO PRECISIONGLIDE REG 22G 1 1/2

## (undated) DEVICE — SUT ETHLN 4/0 FS2 18IN 662G

## (undated) DEVICE — SPNG GZ AVANT 6PLY 4X4IN STRL PK/2

## (undated) DEVICE — DISPOSABLE TOURNIQUET CUFF 34"X4", 1-LINE, BLUE, STERILE, 1EA/PK, 10PK/CS: Brand: ASP MEDICAL

## (undated) DEVICE — CVR HNDL LT SURG ACCSSRY BLU STRL

## (undated) DEVICE — TBG ARTHSCP PUMP W CONN/REDUC 8FT

## (undated) DEVICE — ABL APOLLORF SJ50 ASP 50DEG 3.3MM

## (undated) DEVICE — DRAPE SHEET ULTRAGARD: Brand: MEDLINE

## (undated) DEVICE — GLV SURG SENSICARE PI MIC PF SZ7 LF STRL

## (undated) DEVICE — BNDG ELAS ELITE V/CLOSE 6IN 5YD LF STRL

## (undated) DEVICE — PAD UNDERCAST WYTEX 6IN 4YD LF STRL

## (undated) DEVICE — BNDG ESMARK 6INX9FT STRL

## (undated) DEVICE — INTENDED FOR TISSUE SEPARATION, AND OTHER PROCEDURES THAT REQUIRE A SHARP SURGICAL BLADE TO PUNCTURE OR CUT.: Brand: BARD-PARKER ® CARBON RIB-BACK BLADES

## (undated) DEVICE — GLV SURG BIOGEL SENSR LTX PF SZ8

## (undated) DEVICE — UNDERGLV SURG BIOGEL INDICAT PF 8 GRN

## (undated) DEVICE — NDL HYPO PRECISIONGLIDE/REG 18G 11/2 PNK

## (undated) DEVICE — PAD,ABDOMINAL,5"X9",STERILE,LF,1/PK: Brand: MEDLINE INDUSTRIES, INC.

## (undated) DEVICE — UNDERGLV SURG BIOGEL INDICAT PI SZ8 BLU

## (undated) DEVICE — ABL APOLLO RF M/PRT 50D

## (undated) DEVICE — KT SURG TURNOVER 050

## (undated) DEVICE — TOWEL,OR,DSP,ST,WHITE,DLX,4/PK,20PK/CS: Brand: MEDLINE

## (undated) DEVICE — TBG ARTHSCP DUALWAVE

## (undated) DEVICE — SOL IRR NACL 0.9PCT ARTHROMATIC 3000ML

## (undated) DEVICE — DRSNG TELFA PAD NONADH STR 1S 3X8IN

## (undated) DEVICE — TBG ARTHSCP PT W CONN/REDUC 8FT

## (undated) DEVICE — DRSNG GZ PETROLTM XEROFORM CURAD 1X8IN STRL

## (undated) DEVICE — ANTIBACTERIAL UNDYED BRAIDED (POLYGLACTIN 910), SYNTHETIC ABSORBABLE SUTURE: Brand: COATED VICRYL

## (undated) DEVICE — ZIP 24 SURGICAL SKIN CLOSURE DEVICE, PSA: Brand: ZIP 24 SURGICAL SKIN CLOSURE DEVICE

## (undated) DEVICE — GLV SURG SIGNATURE ESSENTIAL PF LTX SZ7

## (undated) DEVICE — BLD SHAVER EXCALIBUR 4MM 13CM

## (undated) DEVICE — COVER,MAYO STAND,STERILE: Brand: MEDLINE

## (undated) DEVICE — WRAP KNEE COLD THERAPY

## (undated) DEVICE — ADHS SKIN PREMIERPRO EXOFIN TOPICAL HI/VISC .5ML

## (undated) DEVICE — SLV SCD CALF HEMOFORCE DVT THERP REPROC MD

## (undated) DEVICE — PK KN ARTHROSCOPY 50